# Patient Record
Sex: FEMALE | Race: WHITE | Employment: FULL TIME | URBAN - METROPOLITAN AREA
[De-identification: names, ages, dates, MRNs, and addresses within clinical notes are randomized per-mention and may not be internally consistent; named-entity substitution may affect disease eponyms.]

---

## 2023-08-08 ENCOUNTER — APPOINTMENT (OUTPATIENT)
Dept: GENERAL RADIOLOGY | Age: 63
End: 2023-08-08
Payer: COMMERCIAL

## 2023-08-08 ENCOUNTER — HOSPITAL ENCOUNTER (EMERGENCY)
Age: 63
Discharge: HOME | End: 2023-08-08
Attending: EMERGENCY MEDICINE
Payer: COMMERCIAL

## 2023-08-08 VITALS
WEIGHT: 293 LBS | HEART RATE: 83 BPM | TEMPERATURE: 97.8 F | DIASTOLIC BLOOD PRESSURE: 81 MMHG | SYSTOLIC BLOOD PRESSURE: 110 MMHG | HEIGHT: 70 IN | RESPIRATION RATE: 20 BRPM | BODY MASS INDEX: 41.95 KG/M2 | OXYGEN SATURATION: 94 %

## 2023-08-08 DIAGNOSIS — S39.012A LUMBAR STRAIN, INITIAL ENCOUNTER: ICD-10-CM

## 2023-08-08 DIAGNOSIS — N30.00 ACUTE CYSTITIS WITHOUT HEMATURIA: ICD-10-CM

## 2023-08-08 DIAGNOSIS — M25.532 WRIST PAIN, ACUTE, LEFT: Primary | ICD-10-CM

## 2023-08-08 LAB
BACTERIA URNS QL MICRO: ABNORMAL /HPF
BILIRUB UR QL STRIP: NEGATIVE
CLARITY UR: CLEAR
COLOR UR: YELLOW
EPI CELLS #/AREA URNS HPF: ABNORMAL /HPF
GLUCOSE UR STRIP-MCNC: NEGATIVE MG/DL
HGB UR QL STRIP: NORMAL
KETONES UR STRIP-MCNC: NEGATIVE MG/DL
LEUKOCYTE ESTERASE UR QL STRIP: NORMAL
NITRITE UR QL STRIP: POSITIVE
PH UR STRIP: 5 [PH] (ref 5–9)
PROT UR STRIP-MCNC: NEGATIVE MG/DL
RBC #/AREA URNS HPF: ABNORMAL /HPF (ref 0–2)
SP GR UR STRIP: 1.01 (ref 1–1.03)
URINE REFLEX TO CULTURE: NORMAL
UROBILINOGEN UR STRIP-ACNC: 0.2 E.U./DL
WBC #/AREA URNS HPF: ABNORMAL /HPF (ref 0–5)

## 2023-08-08 PROCEDURE — 6370000000 HC RX 637 (ALT 250 FOR IP): Performed by: EMERGENCY MEDICINE

## 2023-08-08 PROCEDURE — 73110 X-RAY EXAM OF WRIST: CPT

## 2023-08-08 PROCEDURE — 81001 URINALYSIS AUTO W/SCOPE: CPT

## 2023-08-08 PROCEDURE — 87077 CULTURE AEROBIC IDENTIFY: CPT

## 2023-08-08 PROCEDURE — 87086 URINE CULTURE/COLONY COUNT: CPT

## 2023-08-08 PROCEDURE — 87186 SC STD MICRODIL/AGAR DIL: CPT

## 2023-08-08 PROCEDURE — 99284 EMERGENCY DEPT VISIT MOD MDM: CPT

## 2023-08-08 PROCEDURE — 71046 X-RAY EXAM CHEST 2 VIEWS: CPT

## 2023-08-08 RX ORDER — FUROSEMIDE 20 MG/1
20 TABLET ORAL DAILY
COMMUNITY

## 2023-08-08 RX ORDER — OXYBUTYNIN CHLORIDE 10 MG/1
10 TABLET, EXTENDED RELEASE ORAL 2 TIMES DAILY
COMMUNITY

## 2023-08-08 RX ORDER — CHLORHEXIDINE GLUCONATE 4 %
LIQUID (ML) TOPICAL
Status: ON HOLD | COMMUNITY
End: 2023-08-31 | Stop reason: HOSPADM

## 2023-08-08 RX ORDER — LEVOTHYROXINE SODIUM 0.2 MG/1
200 TABLET ORAL DAILY
COMMUNITY

## 2023-08-08 RX ORDER — HYDROCODONE BITARTRATE AND ACETAMINOPHEN 5; 325 MG/1; MG/1
1 TABLET ORAL EVERY 6 HOURS PRN
Qty: 12 TABLET | Refills: 0 | Status: SHIPPED | OUTPATIENT
Start: 2023-08-08 | End: 2023-08-11

## 2023-08-08 RX ORDER — OLMESARTAN MEDOXOMIL 40 MG/1
40 TABLET ORAL DAILY
Status: ON HOLD | COMMUNITY
End: 2023-08-31 | Stop reason: HOSPADM

## 2023-08-08 RX ORDER — NITROFURANTOIN 25; 75 MG/1; MG/1
100 CAPSULE ORAL ONCE
Status: COMPLETED | OUTPATIENT
Start: 2023-08-08 | End: 2023-08-08

## 2023-08-08 RX ORDER — NITROFURANTOIN 25; 75 MG/1; MG/1
100 CAPSULE ORAL 2 TIMES DAILY
Qty: 10 CAPSULE | Refills: 0 | Status: SHIPPED | OUTPATIENT
Start: 2023-08-08 | End: 2023-08-13

## 2023-08-08 RX ORDER — CALCIUM POLYCARBOPHIL 625 MG 625 MG/1
625 TABLET ORAL DAILY
COMMUNITY

## 2023-08-08 RX ORDER — HYDROCODONE BITARTRATE AND ACETAMINOPHEN 5; 325 MG/1; MG/1
1 TABLET ORAL ONCE
Status: COMPLETED | OUTPATIENT
Start: 2023-08-08 | End: 2023-08-08

## 2023-08-08 RX ORDER — METOPROLOL TARTRATE 50 MG/1
50 TABLET, FILM COATED ORAL 2 TIMES DAILY
Status: ON HOLD | COMMUNITY
End: 2023-08-31 | Stop reason: HOSPADM

## 2023-08-08 RX ORDER — SPIRONOLACTONE 25 MG/1
25 TABLET ORAL DAILY
COMMUNITY

## 2023-08-08 RX ORDER — ACETAMINOPHEN 500 MG
500 TABLET ORAL EVERY 6 HOURS PRN
Status: ON HOLD | COMMUNITY
End: 2023-08-31 | Stop reason: HOSPADM

## 2023-08-08 RX ORDER — CYCLOBENZAPRINE HCL 10 MG
10 TABLET ORAL 3 TIMES DAILY PRN
Qty: 12 TABLET | Refills: 0 | Status: SHIPPED | OUTPATIENT
Start: 2023-08-08 | End: 2023-08-12

## 2023-08-08 RX ORDER — FLECAINIDE ACETATE 50 MG/1
50 TABLET ORAL 2 TIMES DAILY
COMMUNITY

## 2023-08-08 RX ORDER — OMEPRAZOLE 20 MG/1
20 CAPSULE, DELAYED RELEASE ORAL DAILY
COMMUNITY

## 2023-08-08 RX ORDER — ATORVASTATIN CALCIUM 40 MG/1
40 TABLET, FILM COATED ORAL NIGHTLY
COMMUNITY

## 2023-08-08 RX ORDER — CYCLOBENZAPRINE HCL 10 MG
10 TABLET ORAL ONCE
Status: COMPLETED | OUTPATIENT
Start: 2023-08-08 | End: 2023-08-08

## 2023-08-08 RX ORDER — CALCIUM CARBONATE 500(1250)
500 TABLET ORAL DAILY
Status: ON HOLD | COMMUNITY
End: 2023-08-31 | Stop reason: HOSPADM

## 2023-08-08 RX ORDER — FERROUS SULFATE 325(65) MG
325 TABLET ORAL
Status: ON HOLD | COMMUNITY
End: 2023-08-31 | Stop reason: SDUPTHER

## 2023-08-08 RX ADMIN — HYDROCODONE BITARTRATE AND ACETAMINOPHEN 1 TABLET: 5; 325 TABLET ORAL at 09:00

## 2023-08-08 RX ADMIN — NITROFURANTOIN MONOHYDRATE/MACROCRYSTALS 100 MG: 25; 75 CAPSULE ORAL at 09:19

## 2023-08-08 RX ADMIN — CYCLOBENZAPRINE 10 MG: 10 TABLET, FILM COATED ORAL at 09:00

## 2023-08-08 ASSESSMENT — ENCOUNTER SYMPTOMS
VOMITING: 0
EYE REDNESS: 0
COLOR CHANGE: 0
NAUSEA: 0
COUGH: 0
DIARRHEA: 0
EYE DISCHARGE: 0
SORE THROAT: 0
SHORTNESS OF BREATH: 0
BACK PAIN: 1
ABDOMINAL PAIN: 0
SINUS PAIN: 0

## 2023-08-08 ASSESSMENT — PAIN SCALES - GENERAL: PAINLEVEL_OUTOF10: 8

## 2023-08-08 ASSESSMENT — PAIN - FUNCTIONAL ASSESSMENT: PAIN_FUNCTIONAL_ASSESSMENT: 0-10

## 2023-08-08 ASSESSMENT — PAIN DESCRIPTION - ORIENTATION: ORIENTATION: LEFT

## 2023-08-08 ASSESSMENT — PAIN DESCRIPTION - DESCRIPTORS: DESCRIPTORS: SHARP

## 2023-08-08 ASSESSMENT — PAIN DESCRIPTION - LOCATION: LOCATION: ARM;BACK

## 2023-08-08 NOTE — ED PROVIDER NOTES
[facility] ED  EMERGENCY DEPARTMENT ENCOUNTER      Pt Name: Allegra Mosqueda  MRN: [id number]  [address] [facility], [place], operated by [facility] 1960  Date of evaluation: 8/8/2023  Provider: Krish Brito DO    CHIEF COMPLAINT       Chief Complaint   Patient presents with    Arm Pain     Pt c/o left sided arm pain and mid to upper back pain starting at 2am, denies injury, patient states she has had similar pain in her right hand and feet in the past, Pain is worse with movement and palpation. No deformity or bruising noted, slight swelling noted to left wrist     Chief complaint: Left wrist pain  History of chief complaint: This 80-year-old female presents the emergency department complaining of awakening around 2 AM with left wrist pain. Patient points on the dorsum of the wrist over the area of the distal radius. Patient states wrist is very painful and looks swollen. Patient states she felt fine when she went to bed. Patient denies any injury or trauma. Patient denies any numbness tingling or weakness to the extremity. Patient states pain is worse with movement. Patient denies any pain radiating up into the forearm elbow or upper arm. No associated neck pain. Patient relates a similar previous episode about 6 months ago on her right wrist, patient states she was seen at that time given a shot of steroids which did not seem to do much but pain gradually improved over about a 2-week. Patient states she has had previous milder episodes in her feet as well. Patient states she does have pains in her mid back as well, states she does have intermittent problems with the back with poor cushioning on her chair but did have increased pain in the back this morning points over the bilateral flank area.   Patient denies any dysuria hematuria frequency or urgency, radiation of pain into the buttocks or lower extremities, no loss of bowel or bladder control, no associated abdominal pain no chest pain palpitation or shortness of breath no numb tingling or weakness to the extremities. patient states pain is worse with movement. No recent illness cough cold congestion fevers or chills. Patient reports chronic leg swelling, no pain, not new or different. Patient is on Eliquis with a history of atrial fibrillation    Nursing Notes were reviewed. REVIEW OF SYSTEMS    (2-9 systems for level 4, 10 or more for level 5)     Review of Systems   Constitutional:  Negative for chills, diaphoresis and fever. HENT:  Negative for congestion, sinus pain and sore throat. Eyes:  Negative for discharge and redness. Respiratory:  Negative for cough and shortness of breath. Cardiovascular:  Negative for chest pain and palpitations. Gastrointestinal:  Negative for abdominal pain, diarrhea, nausea and vomiting. Genitourinary:  Positive for flank pain. Negative for difficulty urinating, dysuria and hematuria. Musculoskeletal:  Positive for back pain and joint swelling. Negative for neck pain. Skin:  Negative for color change and rash. Neurological:  Negative for dizziness, weakness, numbness and headaches. Hematological:  Negative for adenopathy. Except as noted above the remainder of the review of systems was reviewed and negative.        PAST MEDICAL HISTORY     Past Medical History:   Diagnosis Date    Atrial fibrillation ([address])     Hyperlipidemia     Hypertension     Thyroid disease          SURGICAL HISTORY       Past Surgical History:   Procedure Laterality Date    JOINT REPLACEMENT      THYROIDECTOMY           CURRENT MEDICATIONS       Previous Medications    ACETAMINOPHEN (TYLENOL) 500 MG TABLET    Take 1 tablet by mouth every 6 hours as needed for Pain    APIXABAN (ELIQUIS) 5 MG TABS TABLET    Take by mouth 2 times daily    ATORVASTATIN (LIPITOR) 40 MG TABLET    Take 1 tablet by mouth daily    CALCIUM CARBONATE (OSCAL) 500 MG TABS TABLET    Take 1 tablet by mouth daily    FERROUS SULFATE (IRON 325) 325 (65 FE) MG TABLET    Take 1 tablet by

## 2023-08-08 NOTE — ED TRIAGE NOTES
Pt c/o left wrist/arm/back pain all worse with movement and palpation. Pt states she has had similar pain in the past but not this bad. Pt states she is in the process of moving from [place] to be closer to family stating she has been in [place] for a month. Pt able to move fingers without difficulty, msps intact.

## 2023-08-10 LAB
BACTERIA UR CULT: ABNORMAL
BACTERIA UR CULT: ABNORMAL
ORGANISM: ABNORMAL

## 2023-08-27 ENCOUNTER — HOSPITAL ENCOUNTER (INPATIENT)
Age: 63
LOS: 3 days | Discharge: NURSING, REHAB OR LONG-TERM CARE | DRG: 683 | End: 2023-08-31
Attending: INTERNAL MEDICINE | Admitting: INTERNAL MEDICINE
Payer: COMMERCIAL

## 2023-08-27 ENCOUNTER — APPOINTMENT (OUTPATIENT)
Dept: GENERAL RADIOLOGY | Age: 63
DRG: 683 | End: 2023-08-27
Payer: COMMERCIAL

## 2023-08-27 DIAGNOSIS — I48.19 ATRIAL FIBRILLATION, PERSISTENT (HCC): ICD-10-CM

## 2023-08-27 DIAGNOSIS — N34.2 INFECTIVE URETHRITIS: ICD-10-CM

## 2023-08-27 DIAGNOSIS — N39.0 URINARY TRACT INFECTION IN FEMALE: Primary | ICD-10-CM

## 2023-08-27 DIAGNOSIS — R53.1 WEAKNESS: ICD-10-CM

## 2023-08-27 DIAGNOSIS — E86.0 DEHYDRATION: ICD-10-CM

## 2023-08-27 DIAGNOSIS — R53.1 GENERALIZED WEAKNESS: ICD-10-CM

## 2023-08-27 LAB
ALBUMIN SERPL-MCNC: 3 G/DL (ref 3.5–4.6)
ALP SERPL-CCNC: 147 U/L (ref 40–130)
ALT SERPL-CCNC: 67 U/L (ref 0–33)
ANION GAP SERPL CALCULATED.3IONS-SCNC: 16 MEQ/L (ref 9–15)
AST SERPL-CCNC: 78 U/L (ref 0–35)
BACTERIA URNS QL MICRO: ABNORMAL /HPF
BASOPHILS # BLD: 0.1 K/UL (ref 0–0.1)
BASOPHILS NFR BLD: 0.6 % (ref 0.1–1.2)
BILIRUB SERPL-MCNC: 0.5 MG/DL (ref 0.2–0.7)
BILIRUB UR QL STRIP: ABNORMAL
BNP BLD-MCNC: 1428 PG/ML
BUN SERPL-MCNC: 60 MG/DL (ref 8–23)
CALCIUM SERPL-MCNC: 10 MG/DL (ref 8.5–9.9)
CHLORIDE SERPL-SCNC: 101 MEQ/L (ref 95–107)
CLARITY UR: ABNORMAL
CO2 SERPL-SCNC: 21 MEQ/L (ref 20–31)
COLOR UR: YELLOW
CREAT SERPL-MCNC: 1.82 MG/DL (ref 0.5–0.9)
EOSINOPHIL # BLD: 0.1 K/UL (ref 0–0.4)
EOSINOPHIL NFR BLD: 1.1 % (ref 0.7–5.8)
EPI CELLS #/AREA URNS HPF: ABNORMAL /HPF
ERYTHROCYTE [DISTWIDTH] IN BLOOD BY AUTOMATED COUNT: 15.4 % (ref 11.7–14.4)
GLOBULIN SER CALC-MCNC: 4.1 G/DL (ref 2.3–3.5)
GLUCOSE SERPL-MCNC: 133 MG/DL (ref 70–99)
GLUCOSE UR STRIP-MCNC: NEGATIVE MG/DL
HCT VFR BLD AUTO: 30 % (ref 37–47)
HGB BLD-MCNC: 9.3 G/DL (ref 11.2–15.7)
HGB UR QL STRIP: ABNORMAL
IMM GRANULOCYTES # BLD: 0 K/UL
IMM GRANULOCYTES NFR BLD: 0.5 %
INR PPP: 1.7
KETONES UR STRIP-MCNC: ABNORMAL MG/DL
LEUKOCYTE ESTERASE UR QL STRIP: ABNORMAL
LYMPHOCYTES # BLD: 0.8 K/UL (ref 1.2–3.7)
LYMPHOCYTES NFR BLD: 9.9 %
MCH RBC QN AUTO: 26.9 PG (ref 25.6–32.2)
MCHC RBC AUTO-ENTMCNC: 31 % (ref 32.2–35.5)
MCV RBC AUTO: 86.7 FL (ref 79.4–94.8)
MONOCYTES # BLD: 0.5 K/UL (ref 0.2–0.9)
MONOCYTES NFR BLD: 6.3 % (ref 4.7–12.5)
NEUTROPHILS # BLD: 6.7 K/UL (ref 1.6–6.1)
NEUTS SEG NFR BLD: 81.6 % (ref 34–71.1)
NITRITE UR QL STRIP: NEGATIVE
PH UR STRIP: 5.5 [PH] (ref 5–9)
PLATELET # BLD AUTO: 479 K/UL (ref 182–369)
POTASSIUM SERPL-SCNC: 4.4 MEQ/L (ref 3.4–4.9)
PROT SERPL-MCNC: 7.1 G/DL (ref 6.3–8)
PROT UR STRIP-MCNC: 100 MG/DL
PROTHROMBIN TIME: 20.4 SEC (ref 12.3–14.9)
RBC # BLD AUTO: 3.46 M/UL (ref 3.93–5.22)
RBC #/AREA URNS HPF: ABNORMAL /HPF (ref 0–2)
SODIUM SERPL-SCNC: 138 MEQ/L (ref 135–144)
SP GR UR STRIP: 1.02 (ref 1–1.03)
TROPONIN T SERPL-MCNC: <0.01 NG/ML (ref 0–0.01)
URINE REFLEX TO CULTURE: YES
UROBILINOGEN UR STRIP-ACNC: 2 E.U./DL
WBC # BLD AUTO: 8.2 K/UL (ref 4–10)
WBC #/AREA URNS HPF: >100 /HPF (ref 0–5)

## 2023-08-27 PROCEDURE — 85025 COMPLETE CBC W/AUTO DIFF WBC: CPT

## 2023-08-27 PROCEDURE — 6370000000 HC RX 637 (ALT 250 FOR IP): Performed by: INTERNAL MEDICINE

## 2023-08-27 PROCEDURE — 6360000002 HC RX W HCPCS: Performed by: INTERNAL MEDICINE

## 2023-08-27 PROCEDURE — 2580000003 HC RX 258

## 2023-08-27 PROCEDURE — 6370000000 HC RX 637 (ALT 250 FOR IP)

## 2023-08-27 PROCEDURE — 81001 URINALYSIS AUTO W/SCOPE: CPT

## 2023-08-27 PROCEDURE — 87040 BLOOD CULTURE FOR BACTERIA: CPT

## 2023-08-27 PROCEDURE — 96365 THER/PROPH/DIAG IV INF INIT: CPT

## 2023-08-27 PROCEDURE — 96361 HYDRATE IV INFUSION ADD-ON: CPT

## 2023-08-27 PROCEDURE — 87086 URINE CULTURE/COLONY COUNT: CPT

## 2023-08-27 PROCEDURE — 93005 ELECTROCARDIOGRAM TRACING: CPT

## 2023-08-27 PROCEDURE — G0378 HOSPITAL OBSERVATION PER HR: HCPCS

## 2023-08-27 PROCEDURE — 83880 ASSAY OF NATRIURETIC PEPTIDE: CPT

## 2023-08-27 PROCEDURE — 80053 COMPREHEN METABOLIC PANEL: CPT

## 2023-08-27 PROCEDURE — 84484 ASSAY OF TROPONIN QUANT: CPT

## 2023-08-27 PROCEDURE — A4216 STERILE WATER/SALINE, 10 ML: HCPCS | Performed by: INTERNAL MEDICINE

## 2023-08-27 PROCEDURE — 96375 TX/PRO/DX INJ NEW DRUG ADDON: CPT

## 2023-08-27 PROCEDURE — 2580000003 HC RX 258: Performed by: INTERNAL MEDICINE

## 2023-08-27 PROCEDURE — 85610 PROTHROMBIN TIME: CPT

## 2023-08-27 PROCEDURE — 99285 EMERGENCY DEPT VISIT HI MDM: CPT

## 2023-08-27 PROCEDURE — 6360000002 HC RX W HCPCS

## 2023-08-27 PROCEDURE — 73562 X-RAY EXAM OF KNEE 3: CPT

## 2023-08-27 PROCEDURE — 87077 CULTURE AEROBIC IDENTIFY: CPT

## 2023-08-27 PROCEDURE — 36415 COLL VENOUS BLD VENIPUNCTURE: CPT

## 2023-08-27 PROCEDURE — C9113 INJ PANTOPRAZOLE SODIUM, VIA: HCPCS | Performed by: INTERNAL MEDICINE

## 2023-08-27 RX ORDER — SODIUM CHLORIDE 9 MG/ML
INJECTION, SOLUTION INTRAVENOUS CONTINUOUS
Status: DISPENSED | OUTPATIENT
Start: 2023-08-27 | End: 2023-08-28

## 2023-08-27 RX ORDER — SODIUM CHLORIDE 0.9 % (FLUSH) 0.9 %
10 SYRINGE (ML) INJECTION EVERY 12 HOURS SCHEDULED
Status: DISCONTINUED | OUTPATIENT
Start: 2023-08-27 | End: 2023-08-31 | Stop reason: HOSPADM

## 2023-08-27 RX ORDER — PANTOPRAZOLE SODIUM 40 MG/1
40 TABLET, DELAYED RELEASE ORAL
Status: DISCONTINUED | OUTPATIENT
Start: 2023-08-28 | End: 2023-08-27

## 2023-08-27 RX ORDER — POLYETHYLENE GLYCOL 3350 17 G/17G
17 POWDER, FOR SOLUTION ORAL DAILY PRN
Status: DISCONTINUED | OUTPATIENT
Start: 2023-08-27 | End: 2023-08-31 | Stop reason: HOSPADM

## 2023-08-27 RX ORDER — ATORVASTATIN CALCIUM 40 MG/1
40 TABLET, FILM COATED ORAL DAILY
Status: DISCONTINUED | OUTPATIENT
Start: 2023-08-27 | End: 2023-08-31 | Stop reason: HOSPADM

## 2023-08-27 RX ORDER — PROMETHAZINE HYDROCHLORIDE 12.5 MG/1
12.5 TABLET ORAL EVERY 6 HOURS PRN
Status: DISCONTINUED | OUTPATIENT
Start: 2023-08-27 | End: 2023-08-31 | Stop reason: HOSPADM

## 2023-08-27 RX ORDER — LEVOTHYROXINE SODIUM 0.1 MG/1
200 TABLET ORAL DAILY
Status: DISCONTINUED | OUTPATIENT
Start: 2023-08-27 | End: 2023-08-31 | Stop reason: HOSPADM

## 2023-08-27 RX ORDER — ONDANSETRON 2 MG/ML
4 INJECTION INTRAMUSCULAR; INTRAVENOUS EVERY 6 HOURS PRN
Status: DISCONTINUED | OUTPATIENT
Start: 2023-08-27 | End: 2023-08-31 | Stop reason: HOSPADM

## 2023-08-27 RX ORDER — ACETAMINOPHEN 650 MG/1
650 SUPPOSITORY RECTAL EVERY 6 HOURS PRN
Status: DISCONTINUED | OUTPATIENT
Start: 2023-08-27 | End: 2023-08-31 | Stop reason: HOSPADM

## 2023-08-27 RX ORDER — METOPROLOL TARTRATE 50 MG/1
50 TABLET, FILM COATED ORAL 2 TIMES DAILY
Status: DISCONTINUED | OUTPATIENT
Start: 2023-08-27 | End: 2023-08-27

## 2023-08-27 RX ORDER — 0.9 % SODIUM CHLORIDE 0.9 %
500 INTRAVENOUS SOLUTION INTRAVENOUS ONCE
Status: COMPLETED | OUTPATIENT
Start: 2023-08-27 | End: 2023-08-27

## 2023-08-27 RX ORDER — SODIUM CHLORIDE 0.9 % (FLUSH) 0.9 %
10 SYRINGE (ML) INJECTION PRN
Status: DISCONTINUED | OUTPATIENT
Start: 2023-08-27 | End: 2023-08-31 | Stop reason: HOSPADM

## 2023-08-27 RX ORDER — FLECAINIDE ACETATE 50 MG/1
50 TABLET ORAL 2 TIMES DAILY
Status: DISCONTINUED | OUTPATIENT
Start: 2023-08-27 | End: 2023-08-31 | Stop reason: HOSPADM

## 2023-08-27 RX ORDER — OXYBUTYNIN CHLORIDE 5 MG/1
10 TABLET, EXTENDED RELEASE ORAL NIGHTLY
Status: DISCONTINUED | OUTPATIENT
Start: 2023-08-27 | End: 2023-08-31 | Stop reason: HOSPADM

## 2023-08-27 RX ORDER — ACETAMINOPHEN 325 MG/1
650 TABLET ORAL EVERY 6 HOURS PRN
Status: DISCONTINUED | OUTPATIENT
Start: 2023-08-27 | End: 2023-08-31 | Stop reason: HOSPADM

## 2023-08-27 RX ORDER — ACETAMINOPHEN 325 MG/1
650 TABLET ORAL ONCE
Status: COMPLETED | OUTPATIENT
Start: 2023-08-27 | End: 2023-08-27

## 2023-08-27 RX ORDER — SODIUM CHLORIDE 9 MG/ML
INJECTION, SOLUTION INTRAVENOUS PRN
Status: DISCONTINUED | OUTPATIENT
Start: 2023-08-27 | End: 2023-08-31 | Stop reason: HOSPADM

## 2023-08-27 RX ADMIN — CEFTRIAXONE 1000 MG: 1 INJECTION, POWDER, FOR SOLUTION INTRAMUSCULAR; INTRAVENOUS at 16:08

## 2023-08-27 RX ADMIN — SODIUM CHLORIDE 500 ML: 9 INJECTION, SOLUTION INTRAVENOUS at 16:07

## 2023-08-27 RX ADMIN — SODIUM CHLORIDE: 9 INJECTION, SOLUTION INTRAVENOUS at 18:13

## 2023-08-27 RX ADMIN — PANTOPRAZOLE SODIUM 40 MG: 40 INJECTION, POWDER, FOR SOLUTION INTRAVENOUS at 18:16

## 2023-08-27 RX ADMIN — OXYBUTYNIN CHLORIDE 10 MG: 5 TABLET, EXTENDED RELEASE ORAL at 20:50

## 2023-08-27 RX ADMIN — ACETAMINOPHEN 650 MG: 325 TABLET ORAL at 20:49

## 2023-08-27 RX ADMIN — FLECAINIDE ACETATE 50 MG: 50 TABLET ORAL at 20:50

## 2023-08-27 RX ADMIN — APIXABAN 5 MG: 5 TABLET, FILM COATED ORAL at 20:50

## 2023-08-27 RX ADMIN — ACETAMINOPHEN 650 MG: 325 TABLET ORAL at 16:10

## 2023-08-27 RX ADMIN — ATORVASTATIN CALCIUM 40 MG: 40 TABLET, FILM COATED ORAL at 20:50

## 2023-08-27 ASSESSMENT — PAIN - FUNCTIONAL ASSESSMENT
PAIN_FUNCTIONAL_ASSESSMENT: 0-10

## 2023-08-27 ASSESSMENT — PAIN DESCRIPTION - LOCATION
LOCATION: KNEE
LOCATION: LEG;KNEE
LOCATION: KNEE
LOCATION: KNEE

## 2023-08-27 ASSESSMENT — LIFESTYLE VARIABLES
HOW OFTEN DO YOU HAVE A DRINK CONTAINING ALCOHOL: NEVER
HOW MANY STANDARD DRINKS CONTAINING ALCOHOL DO YOU HAVE ON A TYPICAL DAY: PATIENT DOES NOT DRINK

## 2023-08-27 ASSESSMENT — PAIN DESCRIPTION - ORIENTATION
ORIENTATION: RIGHT
ORIENTATION: RIGHT;LEFT
ORIENTATION: RIGHT
ORIENTATION: RIGHT

## 2023-08-27 ASSESSMENT — ENCOUNTER SYMPTOMS
SORE THROAT: 0
ABDOMINAL PAIN: 0
NAUSEA: 0
COUGH: 0
DIARRHEA: 0
VOMITING: 0
BACK PAIN: 0
SHORTNESS OF BREATH: 0

## 2023-08-27 ASSESSMENT — PAIN SCALES - GENERAL
PAINLEVEL_OUTOF10: 7
PAINLEVEL_OUTOF10: 6
PAINLEVEL_OUTOF10: 8
PAINLEVEL_OUTOF10: 7
PAINLEVEL_OUTOF10: 7

## 2023-08-27 ASSESSMENT — PAIN DESCRIPTION - ONSET: ONSET: ON-GOING

## 2023-08-27 ASSESSMENT — PAIN DESCRIPTION - FREQUENCY: FREQUENCY: INTERMITTENT

## 2023-08-27 ASSESSMENT — PAIN DESCRIPTION - DESCRIPTORS
DESCRIPTORS: ACHING
DESCRIPTORS: ACHING;SORE
DESCRIPTORS: SORE

## 2023-08-27 ASSESSMENT — PAIN DESCRIPTION - PAIN TYPE
TYPE: ACUTE PAIN
TYPE: ACUTE PAIN

## 2023-08-27 NOTE — ED PROVIDER NOTES
[facility] ED  eMERGENCYdEPARTMENT eNCOUnter      Pt Name: Juan David Arizmendi  MRN: [id number]  [facility] 1960[facility] evaluation: 8/27/2023  Provider:TRAE Reyes CNP    CHIEF COMPLAINT       Chief Complaint   Patient presents with    Fatigue     Progressing weakness and fatigue over the past 3 weeks         HISTORY OF PRESENT ILLNESS  (Location/Symptom, Timing/Onset, Context/Setting, Quality, Duration, Modifying Factors, Severity.)   Juan David Arizmendi is a 59 y.o. female hx of Afib, Thyroid disease, hyperlipidemia, HTN, who presents to the emergency department with fatigue. Presents emergency department with increasing fatigue over the last 3 weeks. She states she moved to [place] from [place] last year and has not established with medical provider. Over the course of the past year she has gradually decreased in her ability to be mobile and care for herself per her report. She states yesterday she slid from the bed to the floor and had to call for EMS to help assist her getting up. She denies any head injury or LOC. She complains of ongoing right knee pain. She states she has difficulty ambulating around her house and is progressively weaker than she was months ago. She is requesting possible hospital admission for therapy rehab as she feels as though she cannot take care of herself at home. SHe is also concerned for possible UTI infection. She denies any chest pain, shortness of breath, abdominal pain, nausea, vomiting, diarrhea, fever, chills ,headache, or recent illness. HPI    Nursing Notes were reviewed and I agree. REVIEW OF SYSTEMS    (2-9 systems for level 4, 10 or more for level 5)     Review of Systems   Constitutional:  Positive for fatigue. Negative for activity change, chills and fever. HENT:  Negative for ear pain and sore throat. Eyes:  Negative for visual disturbance. Respiratory:  Negative for cough and shortness of breath.     Cardiovascular:  Negative for chest pain,

## 2023-08-27 NOTE — ED TRIAGE NOTES
Pt a&o x4. Pt presents to ED with c/o weakness that is progressively worsening over past 3 weeks. Pt states she fell last night attempting to transfer to bed. C/O bilat knee pain, rt hip pain, and bilat foot pain. No obvious injury noted. Pt states she has been using walker, but now unable to self-transfer d/t weakness.

## 2023-08-27 NOTE — ED NOTES
Pt in bed. Advised urine specimen needed. Pt states she \"cannot stand\" to transfer to [facility]. When asked how pt toilets at home, pt states, \"I haven't been able to go to bathroom\". Pt wearing incontinence brief now. Spoke to Donna PARKER, will place order for straight cath. Pt informed of procedure and agrees to POC.       Cuca Troy RN  08/27/23 [id number]

## 2023-08-27 NOTE — ED NOTES
Upon repositioning pt in bed to begin straight cath procedure, pt stated, \"I think I can right now\". Urethra orifice cleaned and pt was able to urinate on own. Urine obtained and sent to lab.       Rigoberto Benitez RN  08/27/23 [id number]

## 2023-08-28 ENCOUNTER — APPOINTMENT (OUTPATIENT)
Dept: CT IMAGING | Age: 63
DRG: 683 | End: 2023-08-28
Payer: COMMERCIAL

## 2023-08-28 ENCOUNTER — APPOINTMENT (OUTPATIENT)
Dept: MRI IMAGING | Age: 63
End: 2023-08-28
Attending: INTERNAL MEDICINE
Payer: COMMERCIAL

## 2023-08-28 PROBLEM — R53.1 WEAKNESS: Status: ACTIVE | Noted: 2023-08-28

## 2023-08-28 LAB
ALBUMIN SERPL-MCNC: 2.6 G/DL (ref 3.5–4.6)
ALP SERPL-CCNC: 116 U/L (ref 40–130)
ALT SERPL-CCNC: 54 U/L (ref 0–33)
ANION GAP SERPL CALCULATED.3IONS-SCNC: 13 MEQ/L (ref 9–15)
AST SERPL-CCNC: 56 U/L (ref 0–35)
BASOPHILS # BLD: 0.1 K/UL (ref 0–0.1)
BASOPHILS NFR BLD: 1.1 % (ref 0.1–1.2)
BILIRUB SERPL-MCNC: 0.3 MG/DL (ref 0.2–0.7)
BUN SERPL-MCNC: 50 MG/DL (ref 8–23)
CALCIUM SERPL-MCNC: 10 MG/DL (ref 8.5–9.9)
CHLORIDE SERPL-SCNC: 105 MEQ/L (ref 95–107)
CO2 SERPL-SCNC: 21 MEQ/L (ref 20–31)
CREAT SERPL-MCNC: 1.25 MG/DL (ref 0.5–0.9)
EKG ATRIAL RATE: 267 BPM
EKG P AXIS: 16 DEGREES
EKG Q-T INTERVAL: 414 MS
EKG QRS DURATION: 100 MS
EKG QTC CALCULATION (BAZETT): 465 MS
EKG R AXIS: -23 DEGREES
EKG T AXIS: 20 DEGREES
EKG VENTRICULAR RATE: 76 BPM
EOSINOPHIL # BLD: 0.3 K/UL (ref 0–0.4)
EOSINOPHIL NFR BLD: 4.6 % (ref 0.7–5.8)
ERYTHROCYTE [DISTWIDTH] IN BLOOD BY AUTOMATED COUNT: 15.3 % (ref 11.7–14.4)
FERRITIN: 582 NG/ML (ref 13–150)
FOLATE: 4.1 NG/ML
GLOBULIN SER CALC-MCNC: 4.6 G/DL (ref 2.3–3.5)
GLUCOSE SERPL-MCNC: 103 MG/DL (ref 70–99)
HAV IGM SER IA-ACNC: NONREACTIVE
HCT VFR BLD AUTO: 27 % (ref 37–47)
HEPATITIS B CORE IGM ANTIBODY: NONREACTIVE
HEPATITIS B SURF AG,XHBAGS: NONREACTIVE
HEPATITIS C ANTIBODY: NONREACTIVE
HGB BLD-MCNC: 8.4 G/DL (ref 11.2–15.7)
IMM GRANULOCYTES # BLD: 0 K/UL
IMM GRANULOCYTES NFR BLD: 0.4 %
IRON SATURATION: 21 % (ref 20–55)
IRON: 35 UG/DL (ref 37–145)
LYMPHOCYTES # BLD: 1.3 K/UL (ref 1.2–3.7)
LYMPHOCYTES NFR BLD: 23.1 %
MCH RBC QN AUTO: 26.7 PG (ref 25.6–32.2)
MCHC RBC AUTO-ENTMCNC: 31.1 % (ref 32.2–35.5)
MCV RBC AUTO: 85.7 FL (ref 79.4–94.8)
MONOCYTES # BLD: 0.5 K/UL (ref 0.2–0.9)
MONOCYTES NFR BLD: 8.5 % (ref 4.7–12.5)
NEUTROPHILS # BLD: 3.5 K/UL (ref 1.6–6.1)
NEUTS SEG NFR BLD: 62.3 % (ref 34–71.1)
PLATELET # BLD AUTO: 430 K/UL (ref 182–369)
POTASSIUM SERPL-SCNC: 3.9 MEQ/L (ref 3.4–4.9)
PROT SERPL-MCNC: 7.2 G/DL (ref 6.3–8)
RBC # BLD AUTO: 3.15 M/UL (ref 3.93–5.22)
SODIUM SERPL-SCNC: 139 MEQ/L (ref 135–144)
TOTAL IRON BINDING CAPACITY: 166 UG/DL (ref 250–450)
TSH SERPL-MCNC: 6.39 UIU/ML (ref 0.44–3.86)
UNSATURATED IRON BINDING CAPACITY: 131 UG/DL (ref 112–347)
VITAMIN B-12: 658 PG/ML (ref 232–1245)
WBC # BLD AUTO: 5.6 K/UL (ref 4–10)

## 2023-08-28 PROCEDURE — 85025 COMPLETE CBC W/AUTO DIFF WBC: CPT

## 2023-08-28 PROCEDURE — 96375 TX/PRO/DX INJ NEW DRUG ADDON: CPT

## 2023-08-28 PROCEDURE — 2580000003 HC RX 258: Performed by: INTERNAL MEDICINE

## 2023-08-28 PROCEDURE — C9113 INJ PANTOPRAZOLE SODIUM, VIA: HCPCS | Performed by: INTERNAL MEDICINE

## 2023-08-28 PROCEDURE — 72148 MRI LUMBAR SPINE W/O DYE: CPT

## 2023-08-28 PROCEDURE — 51798 US URINE CAPACITY MEASURE: CPT

## 2023-08-28 PROCEDURE — 82746 ASSAY OF FOLIC ACID SERUM: CPT

## 2023-08-28 PROCEDURE — 97166 OT EVAL MOD COMPLEX 45 MIN: CPT

## 2023-08-28 PROCEDURE — 97530 THERAPEUTIC ACTIVITIES: CPT

## 2023-08-28 PROCEDURE — 97162 PT EVAL MOD COMPLEX 30 MIN: CPT

## 2023-08-28 PROCEDURE — A4216 STERILE WATER/SALINE, 10 ML: HCPCS | Performed by: INTERNAL MEDICINE

## 2023-08-28 PROCEDURE — 6370000000 HC RX 637 (ALT 250 FOR IP): Performed by: INTERNAL MEDICINE

## 2023-08-28 PROCEDURE — 93010 ELECTROCARDIOGRAM REPORT: CPT | Performed by: INTERNAL MEDICINE

## 2023-08-28 PROCEDURE — 6360000002 HC RX W HCPCS: Performed by: INTERNAL MEDICINE

## 2023-08-28 PROCEDURE — 83540 ASSAY OF IRON: CPT

## 2023-08-28 PROCEDURE — 36415 COLL VENOUS BLD VENIPUNCTURE: CPT

## 2023-08-28 PROCEDURE — 80053 COMPREHEN METABOLIC PANEL: CPT

## 2023-08-28 PROCEDURE — 70450 CT HEAD/BRAIN W/O DYE: CPT

## 2023-08-28 PROCEDURE — 72146 MRI CHEST SPINE W/O DYE: CPT

## 2023-08-28 PROCEDURE — 83550 IRON BINDING TEST: CPT

## 2023-08-28 PROCEDURE — 82607 VITAMIN B-12: CPT

## 2023-08-28 PROCEDURE — 96361 HYDRATE IV INFUSION ADD-ON: CPT

## 2023-08-28 PROCEDURE — 84443 ASSAY THYROID STIM HORMONE: CPT

## 2023-08-28 PROCEDURE — 72141 MRI NECK SPINE W/O DYE: CPT

## 2023-08-28 PROCEDURE — 80074 ACUTE HEPATITIS PANEL: CPT

## 2023-08-28 PROCEDURE — 1210000000 HC MED SURG R&B

## 2023-08-28 PROCEDURE — 82728 ASSAY OF FERRITIN: CPT

## 2023-08-28 RX ORDER — 0.9 % SODIUM CHLORIDE 0.9 %
250 INTRAVENOUS SOLUTION INTRAVENOUS ONCE
Status: COMPLETED | OUTPATIENT
Start: 2023-08-28 | End: 2023-08-28

## 2023-08-28 RX ORDER — SODIUM CHLORIDE 9 MG/ML
INJECTION, SOLUTION INTRAVENOUS CONTINUOUS
Status: DISCONTINUED | OUTPATIENT
Start: 2023-08-28 | End: 2023-08-29

## 2023-08-28 RX ORDER — 0.9 % SODIUM CHLORIDE 0.9 %
500 INTRAVENOUS SOLUTION INTRAVENOUS ONCE
Status: DISCONTINUED | OUTPATIENT
Start: 2023-08-28 | End: 2023-08-28 | Stop reason: ALTCHOICE

## 2023-08-28 RX ORDER — DIAZEPAM 5 MG/1
5 TABLET ORAL
Status: COMPLETED | OUTPATIENT
Start: 2023-08-28 | End: 2023-08-28

## 2023-08-28 RX ADMIN — ATORVASTATIN CALCIUM 40 MG: 40 TABLET, FILM COATED ORAL at 20:28

## 2023-08-28 RX ADMIN — SODIUM CHLORIDE 250 ML: 9 INJECTION, SOLUTION INTRAVENOUS at 20:26

## 2023-08-28 RX ADMIN — LEVOTHYROXINE SODIUM 200 MCG: 100 TABLET ORAL at 05:02

## 2023-08-28 RX ADMIN — CEFTRIAXONE 1000 MG: 1 INJECTION, POWDER, FOR SOLUTION INTRAMUSCULAR; INTRAVENOUS at 10:58

## 2023-08-28 RX ADMIN — ACETAMINOPHEN 650 MG: 325 TABLET ORAL at 02:50

## 2023-08-28 RX ADMIN — FLECAINIDE ACETATE 50 MG: 50 TABLET ORAL at 20:28

## 2023-08-28 RX ADMIN — SODIUM CHLORIDE: 900 INJECTION, SOLUTION INTRAVENOUS at 10:56

## 2023-08-28 RX ADMIN — METOPROLOL TARTRATE 25 MG: 25 TABLET, FILM COATED ORAL at 20:29

## 2023-08-28 RX ADMIN — FLECAINIDE ACETATE 50 MG: 50 TABLET ORAL at 10:17

## 2023-08-28 RX ADMIN — PANTOPRAZOLE SODIUM 40 MG: 40 INJECTION, POWDER, FOR SOLUTION INTRAVENOUS at 04:58

## 2023-08-28 RX ADMIN — OXYBUTYNIN CHLORIDE 10 MG: 5 TABLET, EXTENDED RELEASE ORAL at 20:27

## 2023-08-28 RX ADMIN — APIXABAN 5 MG: 5 TABLET, FILM COATED ORAL at 20:28

## 2023-08-28 RX ADMIN — ACETAMINOPHEN 650 MG: 325 TABLET ORAL at 20:34

## 2023-08-28 RX ADMIN — APIXABAN 5 MG: 5 TABLET, FILM COATED ORAL at 10:16

## 2023-08-28 RX ADMIN — Medication 10 ML: at 10:18

## 2023-08-28 RX ADMIN — DIAZEPAM 5 MG: 5 TABLET ORAL at 14:30

## 2023-08-28 ASSESSMENT — PAIN DESCRIPTION - DESCRIPTORS
DESCRIPTORS: ACHING
DESCRIPTORS: ACHING

## 2023-08-28 ASSESSMENT — PAIN DESCRIPTION - ORIENTATION
ORIENTATION: RIGHT
ORIENTATION: RIGHT;LEFT

## 2023-08-28 ASSESSMENT — PAIN SCALES - GENERAL
PAINLEVEL_OUTOF10: 8
PAINLEVEL_OUTOF10: 8

## 2023-08-28 ASSESSMENT — PAIN DESCRIPTION - LOCATION: LOCATION: KNEE;HIP

## 2023-08-28 NOTE — CARE COORDINATION
MRI cervical, lumbar and thoracic spine is scheduled for [facility] EMERGENCY [facility] AT [place]. Patient has to arrive by 3:00pm, this afternoon. Katy Lopes is transporting patient.

## 2023-08-28 NOTE — PROGRESS NOTES
[facility] Initial Discharge Assessment    Met with Patient to discuss discharge plan. PCP: No primary care provider on file. Patient reports that she recently moved from [place] to [place] \"the end of May\" 2023                                   Date of Last Visit: April 19th in [place]     If no PCP, list provided? List of PCP's provided     Discharge Planning    Living Arrangements:  Patient reports that she has been staying with son in split level home while the ranch home patient recently purchased is being renovated. Patient reports that she has only sponge bathed since residing with son as son's shower \"it was too small\"     Who do you live with? Adult daughter is reported to have autism and is higher functioning. Patient reports that daughter resides with patient     Who helps you with your care:  self, \" In the last three weeks it has gotten so bad I havent been able to walk on my own. \"  Patient reports experiencing an increase in weakness     If lives at home:     Do you have any barriers navigating in your home? yes - patient reports that she is having bathroom renovated and has been staying with son until renovations are completed     Patient can perform ADL? No    Current Services (outpatient and in home) :  None    Dialysis: No    Is transportation available to get to your appointments? Yes    DME Equipment:  yes - rollator, walker, quad cane, a shower asia and grab bars are being installed in bathroom     Respiratory equipment: None    Respiratory provider:  no     Pharmacy:  yes - [address] [facility] [address] to afford cost of medication: Yes    Does patient feel safe at home? Yes    Does patient identify any home going needs? Yes, Patient reports interest in inpatient skilled therapy to get stronger before returning home    Patient agreeable to [address]? N/A    Patient agreeable to SNF/Rehab? Yes, [address]    Other discharge needs identified?       Other patient provided with list of PCPs and encouraged to schedule an appointment     Was Freedom of Choice Provided? Yes    Initial Discharge Plan? (Note: please see concurrent daily documentation for any updates after initial note). Patient was admitted to [facility] and being treated for UTI. Patient's care discussed in daily quality rounds. Patient was evaluated by OT and OT recommending slower paced SNF. This  met with patient to discuss DC planning. Patient reports experiencing a physical decline in the past three weeks and agreeable with receiving inpatient skilled therapies upon DC from [facility]. [place] of choice provided. [id number] identified as facility of choice. This  contacted Tj Osei in admissions at [facility] with new referral. Tj Osei plans to review patient's information. It is worth noting that PT is scheduled to eval patient this afternoon. Patient will require a pre-cert should [facility] be able to meet patient's needs.  SS to continue to follow       Electronically signed by STANISLAV Barrientos on 8/28/2023 at 11:44 AM

## 2023-08-28 NOTE — H&P
Hospital Medicine  History and Physical    Patient: Sarah Sky  MRN: [id number]    CHIEF COMPLAINT:    Chief Complaint   Patient presents with    Fatigue     Progressing weakness and fatigue over the past 3 weeks       History Obtained From:  Patient, EMR  Primary Care Physician: No primary care provider on file. HISTORY OF PRESENT ILLNESS:   The patient is a 59 y.o. female with PMH of atrial fibrillation, hypothyroidism, obesity. Patient came to the emergency room with worsening weakness. She was found to have UTI. Progressive lower extremities more than upper extremities weakness over the last 2 years. Patient stated that she fell at work in [place] in 2021 and suffered pelvic fracture and back contusion. Since then she has been getting worse gradually. Patient reported having bladder incontinence. This is getting worse. Patient also started having intermittent bowel incontinence. No focal deficit. No headaches. No dizziness. No change in mental status. No fever or chills. Past Medical History:      Diagnosis Date    Atrial fibrillation ([address])     Hyperlipidemia     Hypertension     Thyroid disease        Past Surgical History:      Procedure Laterality Date    JOINT REPLACEMENT      THYROIDECTOMY         Medications Prior to Admission:    Prior to Admission medications    Medication Sig Start Date End Date Taking?  Authorizing Provider   apixaban (ELIQUIS) 5 MG TABS tablet Take by mouth 2 times daily    Historical Provider, MD   metoprolol tartrate (LOPRESSOR) 50 MG tablet Take 1 tablet by mouth 2 times daily    Historical Provider, MD   Melatonin 12 MG TABS Take by mouth    Historical Provider, MD   flecainide (TAMBOCOR) 50 MG tablet Take 1 tablet by mouth 2 times daily    Historical Provider, MD   furosemide (LASIX) 20 MG tablet Take 1 tablet by mouth daily    Historical Provider, MD   olmesartan (BENICAR) 40 MG tablet Take 1 tablet by mouth daily    Historical Provider, MD   acetaminophen narrowing of the lateral femoral compartment, with practically bone-on-bone contact. There is mild narrowing of the medial femoral compartment. There is narrowing of the patellofemoral space, with near bone-on-bone contact. No other significant osseous abnormality is seen. A small suprapatellar effusion is noted. No fracture or dislocation. Diffuse osteopenia. Significant osteo-degenerative changes, as described above. Small suprapatellar effusion. Assessment and Plan     *Progressive lower more than upper extremities weakness over the last 2 years ( since 2021 ) after she fell at work in [place] and suffered a pelvic fracture. The patient continued to have gradual worsening of weakness associated with worsening urinary incontinence. Patient also reported that she started having intermittent bowel incontinence. No recent fall or trauma. Patient reported having lower abdominal discomfort. No headaches, no head injury, no change in mental status. Patient is morbidly obese. Broad differential diagnosis including but not limited to spinal cord injury, peripheral neuropathy, deconditioning, myopathy, cerebellar dysfunction, hydrocephalus, brain occupying lesion, others. *UTI. Patient reported having urinary incontinence suspecting either overactive bladder versus neurogenic bladder. *Renal failure, unknown chronicity, no prior work-up. Kidney function improved with IV hydration suspecting acute kidney injury. Unknown baseline. Rule out the possibility of obstructive uropathy or bladder outlet obstruction or neurogenic bladder causing THERESE. No RBC in the urine to suggest acute glomerulonephritis. Small amount of protein. Proteinuria could be caused by the degree of nephrotic syndrome. In addition I could not exclude the possibility of multiple myeloma or light chain disease. *Paroxysmal A-fib. Currently patient is in sinus rhythm. *Anemia. No hematemesis or melena.   No

## 2023-08-28 NOTE — PROGRESS NOTES
stand due to right knee pain and decreased mobiltiy  Ambulation  WB Status: Unable to ambulate during evaluation        Goals  Short Term Goals  Time Frame for Short Term Goals: 1-4 days MS  Short Term Goal 1: Pt able to complete bed mobility with CGA  Short Term Goal 2: To be able to complete transfers with Min A x 1  Short Term Goal 3: Pt able to ambulated with AD with Min A x 1 for 50'x1 with a safe gait pattern  Short Term Goal 4: Pt able to tolerate 10 reps of B LE ther ex to build up pts strength  Short Term Goal 5: Pt able to ambulate up/down 6 steps with HRs and Min x 1 so that pt can safely enter/exit her sons home (where pt is currently living in split level at [facility])  Long Term Goals  Time Frame for Long Term Goals : same as STGs  Patient Goals   Patient Goals :  To be able be independent again       Therapy Time   Individual Concurrent Group Co-treatment   Time In  8:30am          Time Out  9:30am          Minutes  60 min                  Ileana Dietrich, PT

## 2023-08-28 NOTE — PROGRESS NOTES
Facility/Department: [facility] MED SURG UNIT  Occupational Therapy Initial Assessment    Name: Logan Root  : 1960  MRN: [id number]  Date of Service: 2023    Discharge Recommendations:  Continue to assess pending progress, Subacute/Skilled Nursing Facility (slow SNF)          Patient Diagnosis(es): The primary encounter diagnosis was Urinary tract infection in female. Diagnoses of Dehydration, Generalized weakness, and Atrial fibrillation, persistent ([address]) were also pertinent to this visit. Past Medical History:  has a past medical history of Atrial fibrillation ([address]), Hyperlipidemia, Hypertension, and Thyroid disease. Past Surgical History:  has a past surgical history that includes Thyroidectomy and joint replacement. Treatment Diagnosis: Decreased ADL/IADL status d/t weakness/UTI      Assessment   Performance deficits / Impairments: Decreased functional mobility ; Decreased high-level IADLs;Decreased ADL status; Decreased endurance;Decreased fine motor control;Decreased ROM; Decreased coordination;Decreased strength;Decreased balance;Decreased posture;Decreased safe awareness  Assessment: Pt is a 58 y/o F admitted to [facility] after progressive weakness over the past 3 weeks, pt reports she fell at work 2 years ago and suffered a pelvic fx and back contusion. Mobility since has been difficult with pt experiencing recent decline. Pt reports she had pain/weakness in B wrists, that she woke up with symptoms with no injury. Pt had xray which was negative for fx and she wears wrist splints PRN. PLOF pt able to perform ADLs/IADLs with increased time and effort, lately has not been able to perform d/t weakness. 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 During OT eval pt assisted to EOB and stance, R knee crepitus and very painful. L wrist demo's significantly impaired AROM, pt unable to make fist on either hand.  R hand 4th digit and L hand 2nd digit are bruised year or any fall with injury in the past year?: Yes  Receives Help From: Family  ADL Assistance: [address] Independent  Homemaking Responsibilities: Yes  Ambulation Assistance: Independent  Transfer Assistance: Independent  Additional Comments: Pt reports that she had been living with her son that has a split level as pt has not moved into her new home yet; Her bathroom is being remolded; Pt autistic 29 yo daughter lives with and is unable to help to care for pt       Objective   Pulse: 71  BP: (!) 98/45  MAP (Calculated): 63  Respirations: 16  SpO2: 97 %  O2 Device: None (Room air)          Observation/Palpation  Posture: Fair (Unable to come to upright stance)  Observation: IV LUE  Safety Devices  Type of Devices: Patient at risk for falls;Call light within reach; Bed alarm in place; Left in bed;Gait belt  Bed Mobility Training  Bed Mobility Training: Yes  Overall Level of Assistance: Moderate assistance; Total assistance (Mod A for supine>EOB, requires Max x2/Dep to return to bed.)  Interventions: Safety awareness training;Verbal cues  Supine to Sit: Moderate assistance  Sit to Supine: Maximum assistance; Total assistance;Assist X2  Scooting: Maximum assistance  Balance  Sitting: Intact  Standing: Impaired (Stood with FWW and forearms supported on device, could not stand upright d/t R knee pain.)  Transfer Training  Transfer Training: Yes  Overall Level of Assistance: Maximum assistance;Assist X2 (Pt tolerated x2 sit<>stand transfers using bariatric FWW, painful and could not come to upright stance.  Pt could not tolerate weight shifting or taking steps.)  Interventions: Safety awareness training;Verbal cues  Sit to Stand: Maximum assistance;Assist X2  Stand to Sit: Maximum assistance;Assist X2     AROM: Generally decreased, functional  Strength: Generally decreased, functional  Coordination: Generally decreased, functional (Cannot make composite fist on either hand, thumb<>finger opposition

## 2023-08-29 ENCOUNTER — APPOINTMENT (OUTPATIENT)
Age: 63
DRG: 683 | End: 2023-08-29
Attending: INTERNAL MEDICINE
Payer: COMMERCIAL

## 2023-08-29 LAB
ALT SERPL-CCNC: 36 U/L (ref 0–33)
ANION GAP SERPL CALCULATED.3IONS-SCNC: 10 MEQ/L (ref 9–15)
AST SERPL-CCNC: 29 U/L (ref 0–35)
BASOPHILS # BLD: 0.1 K/UL (ref 0–0.1)
BASOPHILS NFR BLD: 1.2 % (ref 0.1–1.2)
BUN SERPL-MCNC: 30 MG/DL (ref 8–23)
C-REACTIVE PROTEIN, HIGH SENSITIVITY: 78.9 MG/L (ref 0–5)
CALCIUM SERPL-MCNC: 9.8 MG/DL (ref 8.5–9.9)
CHLORIDE SERPL-SCNC: 110 MEQ/L (ref 95–107)
CO2 SERPL-SCNC: 22 MEQ/L (ref 20–31)
CREAT SERPL-MCNC: 0.8 MG/DL (ref 0.5–0.9)
ECHO AV AREA PEAK VELOCITY: 2.6 CM2
ECHO AV AREA PEAK VELOCITY: 2.7 CM2
ECHO AV AREA PEAK VELOCITY: 2.8 CM2
ECHO AV AREA PEAK VELOCITY: 2.9 CM2
ECHO AV AREA VTI: 2.7 CM2
ECHO AV AREA/BSA VTI: 1.1 CM2/M2
ECHO AV CUSP MM: 2.3 CM
ECHO AV MEAN GRADIENT: 5 MMHG
ECHO AV MEAN VELOCITY: 1 M/S
ECHO AV PEAK GRADIENT: 10 MMHG
ECHO AV PEAK GRADIENT: 9 MMHG
ECHO AV PEAK VELOCITY: 1.5 M/S
ECHO AV PEAK VELOCITY: 1.6 M/S
ECHO AV VTI: 30.8 CM
ECHO BSA: 2.72 M2
ECHO LA VOL 2C: 34 ML (ref 22–52)
ECHO LA VOL 2C: 34 ML (ref 22–52)
ECHO LA VOL 4C: 45 ML (ref 22–52)
ECHO LA VOL 4C: 50 ML (ref 22–52)
ECHO LA VOLUME AREA LENGTH: 43 ML
ECHO LA VOLUME INDEX AREA LENGTH: 17 ML/M2 (ref 16–34)
ECHO LV E' LATERAL VELOCITY: 15 CM/S
ECHO LV E' SEPTAL VELOCITY: 8 CM/S
ECHO LV EDV A2C: 95 ML
ECHO LV EDV A4C: 87 ML
ECHO LV EDV BP: 98 ML (ref 56–104)
ECHO LV EDV INDEX A4C: 34 ML/M2
ECHO LV EDV INDEX BP: 38 ML/M2
ECHO LV EDV NDEX A2C: 37 ML/M2
ECHO LV EJECTION FRACTION A2C: 71 %
ECHO LV EJECTION FRACTION A4C: 65 %
ECHO LV EJECTION FRACTION BIPLANE: 70 % (ref 55–100)
ECHO LV ESV A2C: 27 ML
ECHO LV ESV A4C: 31 ML
ECHO LV ESV BP: 29 ML (ref 19–49)
ECHO LV ESV INDEX A2C: 11 ML/M2
ECHO LV ESV INDEX A4C: 12 ML/M2
ECHO LV ESV INDEX BP: 11 ML/M2
ECHO LV FRACTIONAL SHORTENING: 31 % (ref 28–44)
ECHO LV INTERNAL DIMENSION DIASTOLE INDEX: 2.3 CM/M2
ECHO LV INTERNAL DIMENSION DIASTOLIC: 5.9 CM (ref 3.9–5.3)
ECHO LV INTERNAL DIMENSION SYSTOLIC INDEX: 1.6 CM/M2
ECHO LV INTERNAL DIMENSION SYSTOLIC: 4.1 CM
ECHO LV IVSD: 1.1 CM (ref 0.6–0.9)
ECHO LV IVSS: 1.1 CM
ECHO LV MASS 2D: 305.5 G (ref 67–162)
ECHO LV MASS INDEX 2D: 118.9 G/M2 (ref 43–95)
ECHO LV POSTERIOR WALL DIASTOLIC: 1.3 CM (ref 0.6–0.9)
ECHO LV POSTERIOR WALL SYSTOLIC: 1.1 CM
ECHO LV RELATIVE WALL THICKNESS RATIO: 0.44
ECHO LVOT AREA: 3.8 CM2
ECHO LVOT AV VTI INDEX: 0.71
ECHO LVOT DIAM: 2.2 CM
ECHO LVOT MEAN GRADIENT: 3 MMHG
ECHO LVOT PEAK GRADIENT: 5 MMHG
ECHO LVOT PEAK GRADIENT: 5 MMHG
ECHO LVOT PEAK VELOCITY: 1.1 M/S
ECHO LVOT PEAK VELOCITY: 1.1 M/S
ECHO LVOT STROKE VOLUME INDEX: 32.4 ML/M2
ECHO LVOT SV: 83.2 ML
ECHO LVOT VTI: 21.9 CM
ECHO MV A VELOCITY: 1.11 M/S
ECHO MV AREA PHT: 2.8 CM2
ECHO MV E DECELERATION TIME (DT): 218.5 MS
ECHO MV E VELOCITY: 0.91 M/S
ECHO MV E/A RATIO: 0.82
ECHO MV E/E' LATERAL: 6.07
ECHO MV E/E' RATIO (AVERAGED): 8.72
ECHO MV E/E' SEPTAL: 11.38
ECHO MV PRESSURE HALF TIME (PHT): 79.6 MS
ECHO PV MAX VELOCITY: 1.2 M/S
ECHO PV PEAK GRADIENT: 6 MMHG
ECHO RV INTERNAL DIMENSION: 1.4 CM
ECHO RVOT PEAK GRADIENT: 1 MMHG
ECHO RVOT PEAK VELOCITY: 0.5 M/S
ECHO TV REGURGITANT MAX VELOCITY: 2.24 M/S
ECHO TV REGURGITANT PEAK GRADIENT: 20 MMHG
EOSINOPHIL # BLD: 0.2 K/UL (ref 0–0.4)
EOSINOPHIL NFR BLD: 4.8 % (ref 0.7–5.8)
ERYTHROCYTE [DISTWIDTH] IN BLOOD BY AUTOMATED COUNT: 15.4 % (ref 11.7–14.4)
ERYTHROCYTE [SEDIMENTATION RATE] IN BLOOD BY WESTERGREN METHOD: 32 MM (ref 0–30)
GLUCOSE SERPL-MCNC: 98 MG/DL (ref 70–99)
HBA1C MFR BLD: 6.2 % (ref 4.8–5.9)
HCT VFR BLD AUTO: 24.8 % (ref 37–47)
HGB BLD-MCNC: 7.7 G/DL (ref 11.2–15.7)
HOMOCYSTEINE: 23.1 UMOL/L
IMM GRANULOCYTES # BLD: 0 K/UL
IMM GRANULOCYTES NFR BLD: 0.8 %
LACTATE BLDV-SCNC: 0.8 MMOL/L (ref 0.5–2.2)
LDH SERPL-CCNC: 99 U/L (ref 135–214)
LYMPHOCYTES # BLD: 1.3 K/UL (ref 1.2–3.7)
LYMPHOCYTES NFR BLD: 26 %
MCH RBC QN AUTO: 26.8 PG (ref 25.6–32.2)
MCHC RBC AUTO-ENTMCNC: 31 % (ref 32.2–35.5)
MCV RBC AUTO: 86.4 FL (ref 79.4–94.8)
MONOCYTES # BLD: 0.5 K/UL (ref 0.2–0.9)
MONOCYTES NFR BLD: 9.1 % (ref 4.7–12.5)
NEUTROPHILS # BLD: 2.9 K/UL (ref 1.6–6.1)
NEUTS SEG NFR BLD: 58.1 % (ref 34–71.1)
PLATELET # BLD AUTO: 414 K/UL (ref 182–369)
POTASSIUM SERPL-SCNC: 4.2 MEQ/L (ref 3.4–4.9)
RBC # BLD AUTO: 2.87 M/UL (ref 3.93–5.22)
RHEUMATOID FACTOR: 100.5 IU/ML
SODIUM SERPL-SCNC: 142 MEQ/L (ref 135–144)
T4 FREE SERPL-MCNC: 1.3 NG/DL (ref 0.84–1.68)
WBC # BLD AUTO: 5 K/UL (ref 4–10)

## 2023-08-29 PROCEDURE — 83520 IMMUNOASSAY QUANT NOS NONAB: CPT

## 2023-08-29 PROCEDURE — 84439 ASSAY OF FREE THYROXINE: CPT

## 2023-08-29 PROCEDURE — 86592 SYPHILIS TEST NON-TREP QUAL: CPT

## 2023-08-29 PROCEDURE — 2500000003 HC RX 250 WO HCPCS: Performed by: INTERNAL MEDICINE

## 2023-08-29 PROCEDURE — 80074 ACUTE HEPATITIS PANEL: CPT

## 2023-08-29 PROCEDURE — 84460 ALANINE AMINO (ALT) (SGPT): CPT

## 2023-08-29 PROCEDURE — C9113 INJ PANTOPRAZOLE SODIUM, VIA: HCPCS | Performed by: INTERNAL MEDICINE

## 2023-08-29 PROCEDURE — 84446 ASSAY OF VITAMIN E: CPT

## 2023-08-29 PROCEDURE — 97110 THERAPEUTIC EXERCISES: CPT

## 2023-08-29 PROCEDURE — 84165 PROTEIN E-PHORESIS SERUM: CPT

## 2023-08-29 PROCEDURE — 80048 BASIC METABOLIC PNL TOTAL CA: CPT

## 2023-08-29 PROCEDURE — 83605 ASSAY OF LACTIC ACID: CPT

## 2023-08-29 PROCEDURE — 93306 TTE W/DOPPLER COMPLETE: CPT

## 2023-08-29 PROCEDURE — 86141 C-REACTIVE PROTEIN HS: CPT

## 2023-08-29 PROCEDURE — 86431 RHEUMATOID FACTOR QUANT: CPT

## 2023-08-29 PROCEDURE — 97535 SELF CARE MNGMENT TRAINING: CPT

## 2023-08-29 PROCEDURE — 86789 WEST NILE VIRUS ANTIBODY: CPT

## 2023-08-29 PROCEDURE — 84450 TRANSFERASE (AST) (SGOT): CPT

## 2023-08-29 PROCEDURE — 85025 COMPLETE CBC W/AUTO DIFF WBC: CPT

## 2023-08-29 PROCEDURE — 1210000000 HC MED SURG R&B

## 2023-08-29 PROCEDURE — 86788 WEST NILE VIRUS AB IGM: CPT

## 2023-08-29 PROCEDURE — 82274 ASSAY TEST FOR BLOOD FECAL: CPT

## 2023-08-29 PROCEDURE — 84207 ASSAY OF VITAMIN B-6: CPT

## 2023-08-29 PROCEDURE — 2580000003 HC RX 258: Performed by: INTERNAL MEDICINE

## 2023-08-29 PROCEDURE — 36415 COLL VENOUS BLD VENIPUNCTURE: CPT

## 2023-08-29 PROCEDURE — 86038 ANTINUCLEAR ANTIBODIES: CPT

## 2023-08-29 PROCEDURE — 84155 ASSAY OF PROTEIN SERUM: CPT

## 2023-08-29 PROCEDURE — 6370000000 HC RX 637 (ALT 250 FOR IP): Performed by: INTERNAL MEDICINE

## 2023-08-29 PROCEDURE — 97530 THERAPEUTIC ACTIVITIES: CPT

## 2023-08-29 PROCEDURE — A4216 STERILE WATER/SALINE, 10 ML: HCPCS | Performed by: INTERNAL MEDICINE

## 2023-08-29 PROCEDURE — 83615 LACTATE (LD) (LDH) ENZYME: CPT

## 2023-08-29 PROCEDURE — 6360000002 HC RX W HCPCS: Performed by: INTERNAL MEDICINE

## 2023-08-29 PROCEDURE — 82085 ASSAY OF ALDOLASE: CPT

## 2023-08-29 PROCEDURE — 85652 RBC SED RATE AUTOMATED: CPT

## 2023-08-29 PROCEDURE — 83036 HEMOGLOBIN GLYCOSYLATED A1C: CPT

## 2023-08-29 PROCEDURE — 83090 ASSAY OF HOMOCYSTEINE: CPT

## 2023-08-29 PROCEDURE — 83921 ORGANIC ACID SINGLE QUANT: CPT

## 2023-08-29 RX ORDER — FOLIC ACID 5 MG/ML
1 INJECTION, SOLUTION INTRAMUSCULAR; INTRAVENOUS; SUBCUTANEOUS DAILY
Status: DISCONTINUED | OUTPATIENT
Start: 2023-08-29 | End: 2023-08-29

## 2023-08-29 RX ADMIN — CEFTRIAXONE 1000 MG: 1 INJECTION, POWDER, FOR SOLUTION INTRAMUSCULAR; INTRAVENOUS at 12:19

## 2023-08-29 RX ADMIN — METOPROLOL TARTRATE 25 MG: 25 TABLET, FILM COATED ORAL at 08:28

## 2023-08-29 RX ADMIN — FLECAINIDE ACETATE 50 MG: 50 TABLET ORAL at 08:28

## 2023-08-29 RX ADMIN — APIXABAN 5 MG: 5 TABLET, FILM COATED ORAL at 08:28

## 2023-08-29 RX ADMIN — SODIUM CHLORIDE: 900 INJECTION, SOLUTION INTRAVENOUS at 09:00

## 2023-08-29 RX ADMIN — PANTOPRAZOLE SODIUM 40 MG: 40 INJECTION, POWDER, FOR SOLUTION INTRAVENOUS at 06:11

## 2023-08-29 RX ADMIN — METOPROLOL TARTRATE 25 MG: 25 TABLET, FILM COATED ORAL at 20:07

## 2023-08-29 RX ADMIN — FOLIC ACID 1 MG: 5 INJECTION, SOLUTION INTRAMUSCULAR; INTRAVENOUS; SUBCUTANEOUS at 08:26

## 2023-08-29 RX ADMIN — APIXABAN 5 MG: 5 TABLET, FILM COATED ORAL at 20:07

## 2023-08-29 RX ADMIN — OXYBUTYNIN CHLORIDE 10 MG: 5 TABLET, EXTENDED RELEASE ORAL at 20:07

## 2023-08-29 RX ADMIN — FLECAINIDE ACETATE 50 MG: 50 TABLET ORAL at 20:07

## 2023-08-29 RX ADMIN — LEVOTHYROXINE SODIUM 200 MCG: 100 TABLET ORAL at 06:10

## 2023-08-29 RX ADMIN — IRON SUCROSE 200 MG: 20 INJECTION, SOLUTION INTRAVENOUS at 13:48

## 2023-08-29 RX ADMIN — ATORVASTATIN CALCIUM 40 MG: 40 TABLET, FILM COATED ORAL at 20:07

## 2023-08-29 ASSESSMENT — PAIN SCALES - GENERAL: PAINLEVEL_OUTOF10: 0

## 2023-08-29 NOTE — PROGRESS NOTES
Facility/Department: [facility] MED SURG UNIT  Daily Treatment Note  NAME: Katelyn Swain  : 1960  MRN: [id number]    Date of Service: 2023    Discharge Recommendations:  Continue to assess pending progress, Subacute/Skilled Nursing Facility (slow SNF)         Patient Diagnosis(es): The primary encounter diagnosis was Urinary tract infection in female. Diagnoses of Dehydration, Generalized weakness, and Atrial fibrillation, persistent ([address]) were also pertinent to this visit. Assessment    Assessment: Pt presents in bed and agreeable to BUE AROM and bed mobility. She reports neuralgia pain in several joints and has pain with movement. L wrist and R knee most painful with pain in neck and shoulders as well. Pt has burrell catheter d/t UTI, incontinence, decreased mobility. Pt completed BUE AROM in supine with very little movement in L wrist/hand demonstrated. Provided joint blocking to digits on L hand to improve AROM. Pt rolled L<>R in bed several trials for toilet hygiene and linen change d/t burrell was leaking. Physical therapy arrived and assisted with bed mobility to EOB and standing trails. Pt requires x2 assist and encouragement to stand. She has increased pain with standing in L wrist and R knee. Pt requires increased time d/t level of assist needed, incontinence, and extent of therapy needs. Continue to recommend slow SNF at d/c to increase independence with ADLs. Activity Tolerance: Patient limited by fatigue;Patient limited by pain; Patient limited by endurance  Discharge Recommendations: Continue to assess pending progress;Subacute/Skilled Nursing Facility (slow SNF)      Plan   Occupational Therapy Plan  Times Per Week: 4-7  Times Per Day: Once a day  Current Treatment Recommendations: Strengthening;ROM;Neuromuscular re-education;Equipment evaluation, education, & procurement; Functional mobility training;Patient/Caregiver education & training;Self-Care / ADL;Endurance training; Safety education &

## 2023-08-29 NOTE — PROGRESS NOTES
Physical Therapy  Facility/Department: [facility] MED SURG UNIT  Daily Treatment Note  NAME: Nguyễn Aguirre  : 1960  MRN: [id number]    Date of Service: 2023    Discharge Recommendations:  Continue to assess pending progress, Subacute/Skilled Nursing Facility        Patient Diagnosis(es): The primary encounter diagnosis was Urinary tract infection in female. Diagnoses of Dehydration, Generalized weakness, and Atrial fibrillation, persistent ([address]) were also pertinent to this visit. Assessment   Assessment: (P) Pt. tolerated bed mobility training and tsf training this date. Pt. reports fear and demo's some resistance to stand. Pt. able to complete bed mobility training with moderate assistance and 2 person to help pt sit up edge of bed and to lay down in bed. Reposition pt. in L sidelying for pressure relief. Pt. did tolerate stand edge of bed ~ 2 and 1/2 minutes at best with encouragement. Pt. limited by c/o R knee and left hand pain. Pt. requires mulitple cues and indicates the need for more strengthening and pregait training to benefit with advancing gait goals. Pt. demo's and reports increase fear to ambulate due to c/o weakness, pain, and fear of falling. Pt. completed 10 reps of seated LE ther ex to assist with ROM and strength. Activity Tolerance: (P) Patient limited by fatigue;Patient limited by pain; Patient limited by endurance     Plan    Physcial Therapy Plan  General Plan: (P) 5-7 times per week (1-2)  Current Treatment Recommendations: (P) Strengthening;Balance training;Functional mobility training;Transfer training;Gait training; Endurance training;Stair training;Home exercise program;Pain management; Therapeutic activities     Restrictions  Restrictions/Precautions  Restrictions/Precautions: Fall Risk, Up as Tolerated     Subjective    Subjective  Subjective: (P) Pt. up in bed c/o 6/10 R knee pain at rest. Pt. reports left wrist pain as well 7/10.   Pain: 7/10 L wrist, R knee 3: Pt able to ambulated with AD with Min A x 1 for 50'x1 with a safe gait pattern  Short Term Goal 4: Pt able to tolerate 10 reps of B LE ther ex to build up pts strength  Short Term Goal 5: Pt able to ambulate up/down 6 steps with HRs and Min x 1 so that pt can safely enter/exit her sons home (where pt is currently living in split level at sons home)  Long Term Goals  Time Frame for Long Term Goals : same as STGs  Patient Goals   Patient Goals :  To be able be independent again    Education       Therapy Time   Individual Concurrent Group Co-treatment   Time In  305         Time Out  [address] [place] [address] .[phone]

## 2023-08-29 NOTE — PROGRESS NOTES
Pt alert and oriented. Pt has a burrell cath in place. Pt has some pain in her joints. Pt medicated per mar. Pt denies any needs at this time. Pt call light in reach and bed alarm is on. Pt vitals stable. Will continue to monitor pt.   Electronically signed by Jayy Randall RN on 8/28/2023 at 9:47 PM

## 2023-08-29 NOTE — PROGRESS NOTES
Chart reviewed. Collaborated with [facility] RN House supervisor. Plan remains for patient to DC to [facility] pending insurance approval.  This  attempted to follow up with [facility]. Voice mail left with Tj Osei in admission at [facility] encouraging facility to contact this  once precert obtained.   Patient will require [phone] prior to admission to SNF

## 2023-08-29 NOTE — PROGRESS NOTES
Follow-up in a patient. Patient is feeling better overall. She continues to have difficulty ambulating. This is not acute or even subacute. This has been going on for the last 2 years and gradually getting worse. No abdominal pain. No chest pain. ROS: 12 system review otherwise is negative for acute signs or symptoms over the last 24 hrs. Exam: General appearance: alert, appears stated age and cooperative, no acute distress. Morbidly obese. Skin: Skin color, texture, turgor normal. No rashes or lesions  HEENT: Head: Normocephalic, no lesions, without obvious abnormality. Neck: no adenopathy, no carotid bruit, no JVD, supple, symmetrical, trachea midline, and thyroid not enlarged, symmetric, no tenderness/mass/nodules  Lungs: clear to auscultation bilaterally  Heart: regular rate and rhythm, S1, S2 normal, no murmur, click, rub or gallop  Abdomen: soft, non-tender; bowel sounds normal; no masses,  no organomegaly  Extremities: extremities normal, atraumatic, no cyanosis or edema  Neurologic: Mental status: Alert, oriented, thought content appropriate. Mild hyperreflexia in upper and lower extremities symmetrically. Patient is able to lift upper arms against gravity and resistance.  4-5/5. Lower extremities: The right 1 is 4/5 on the left one is 3-4/5. No sensory loss. Assessment and plan:     *Progressive lower more than upper extremities weakness over the last 2 years ( since 2021 ) after she fell at work in [place] and suffered a pelvic fracture. Prior to that the patient had bilateral hip replacement. The patient continued to have gradual worsening of weakness associated with worsening urinary incontinence. Patient also reported that she started having intermittent bowel incontinence. No recent fall or trauma. No headaches, no head injury, no change in mental status. Patient also reported that she has arthritis in both knees. She was waiting to get right knee replacement.   I consulted etiology anemia. Her folate is low confirming folate deficiency. Patient will be started on iron and folate supplementation. *Elevated ferritin and C-reactive protein. No clinical evidence of infection other than her UTI. I would recommend to repeat both levels after patient completes her antibiotic treatment. Blood cultures pending. *Severe morbid obesity. Patient weighs 340 pounds, BMI is 50. *Hypothyroidism. TSH is slightly elevated but free T4 is normal.  Continue same Synthroid dose. I may or may not have addressed all of this pt symptoms, medical issues, abnormal labs and findings. Pt will need additional work up, investigation, testing, surveillance, and treatment to be done at a later time and date during this hospitalization or post discharge by PCP and other out pt providers. Portion of patient care is managed by other providers. Please refer to their notes for details. I will follow specialists recommendations given their expertise in specialty subject matters.

## 2023-08-29 NOTE — CONSULTS
Tanvir Martinez  1960   (59 y.o.)  female  8/28/2023 11:16 PM    CONSULTATION -- NEUROLOGY    Impression, Comments, Recommendations  and Patient Instructions --    Weakness    Scattered pattern and variable severity. No evident single cause. Unclear how much is antalgic. Myopathy panel. Given the arthralgias, ortho consult may be warranted. Agree with rehab     Peripheral polyneuropathy        Neuropathy panel. Consultation requested by Dr. Wolf Wasserman MD.    Subjective     CC WEAKNESS   Tx    AEs    Hx Subacutely progressive weakness over the past 2 weeks or so to the point that pt was unable to stand even with the assistance of a lift chair. Pt audra moved here from [place]. Oct 2021 pt fell, fx pelvis. Instr to use rollator until Apr 2022 when she was instructed to weak off it. However, even afterward, pt often could not use only a cane and had to return to it using the rollator. Onset    Semeiology    Imaging MR C-s - normal cord, deepthi sten C5-6 mod-sev B  MR T-s - normal cord  MR L-s - nromal cord, anterolisthesis L4-5 gr I;  osseous haemangiomata   Testing    Failed          Objective   Physical Examination --  The patient is in no acute distress. Rather unkempt; morbidly obese. Vital signs: reviewed as per the graphic sheet. Skin: examination demonstrated no evident lesions. HEENT: bruits absent. Heart: cardiac rhythm regular, with no murmur. Lungs: breath sounds normal throughout. .    Abdomen: grossly normal.   Extremities: no clubbing, cyanosis, or edema. Neurological Examination --   Mental status: The patient is alert. Orientation is to person, place, and time. Thought content and form is normal. Comprehension is normal. Phonation, articulation, resonance, and prosody are normal.    CNN: Pupils are equal, 4 mm OU, round, and normally reactive to light and accommodation, both directly and consensually.  Visual fields are grossly full

## 2023-08-30 LAB
ALDOLASE SERPL-CCNC: 2.7 U/L (ref 1.2–7.6)
ANION GAP SERPL CALCULATED.3IONS-SCNC: 12 MEQ/L (ref 9–15)
BACTERIA UR CULT: NORMAL
BASOPHILS # BLD: 0.1 K/UL (ref 0–0.1)
BASOPHILS NFR BLD: 0.7 % (ref 0.1–1.2)
BUN SERPL-MCNC: 19 MG/DL (ref 8–23)
C-REACTIVE PROTEIN, HIGH SENSITIVITY: 48.9 MG/L (ref 0–5)
CALCIUM SERPL-MCNC: 10.2 MG/DL (ref 8.5–9.9)
CHLORIDE SERPL-SCNC: 103 MEQ/L (ref 95–107)
CO2 SERPL-SCNC: 23 MEQ/L (ref 20–31)
CREAT SERPL-MCNC: 0.87 MG/DL (ref 0.5–0.9)
DEPRECATED KAPPA LC FREE/LAMBDA SER: 1.81 {RATIO} (ref 0.26–1.65)
EOSINOPHIL # BLD: 0.2 K/UL (ref 0–0.4)
EOSINOPHIL NFR BLD: 3.5 % (ref 0.7–5.8)
ERYTHROCYTE [DISTWIDTH] IN BLOOD BY AUTOMATED COUNT: 15.6 % (ref 11.7–14.4)
GLUCOSE SERPL-MCNC: 170 MG/DL (ref 70–99)
HAV IGM SER IA-ACNC: NONREACTIVE
HCT VFR BLD AUTO: 28.5 % (ref 37–47)
HEMOCCULT STL QL IA: NORMAL
HEPATITIS B CORE IGM ANTIBODY: NONREACTIVE
HEPATITIS B SURF AG,XHBAGS: NONREACTIVE
HEPATITIS C ANTIBODY: NONREACTIVE
HGB BLD-MCNC: 8.8 G/DL (ref 11.2–15.7)
IMM GRANULOCYTES # BLD: 0.2 K/UL
IMM GRANULOCYTES NFR BLD: 2.3 %
KAPPA LC FREE SER-MCNC: 92.8 MG/L (ref 3.3–19.4)
LAMBDA LC FREE SERPL-MCNC: 51.14 MG/L (ref 5.71–26.3)
LYMPHOCYTES # BLD: 1.8 K/UL (ref 1.2–3.7)
LYMPHOCYTES NFR BLD: 26.5 %
MCH RBC QN AUTO: 26.7 PG (ref 25.6–32.2)
MCHC RBC AUTO-ENTMCNC: 30.9 % (ref 32.2–35.5)
MCV RBC AUTO: 86.6 FL (ref 79.4–94.8)
MONOCYTES # BLD: 0.4 K/UL (ref 0.2–0.9)
MONOCYTES NFR BLD: 6.3 % (ref 4.7–12.5)
NEUTROPHILS # BLD: 4.2 K/UL (ref 1.6–6.1)
NEUTS SEG NFR BLD: 60.7 % (ref 34–71.1)
PLATELET # BLD AUTO: 420 K/UL (ref 182–369)
POTASSIUM SERPL-SCNC: 3.9 MEQ/L (ref 3.4–4.9)
RBC # BLD AUTO: 3.29 M/UL (ref 3.93–5.22)
RPR SER QL: NORMAL
SODIUM SERPL-SCNC: 138 MEQ/L (ref 135–144)
WBC # BLD AUTO: 6.9 K/UL (ref 4–10)

## 2023-08-30 PROCEDURE — C9113 INJ PANTOPRAZOLE SODIUM, VIA: HCPCS | Performed by: INTERNAL MEDICINE

## 2023-08-30 PROCEDURE — 6370000000 HC RX 637 (ALT 250 FOR IP): Performed by: INTERNAL MEDICINE

## 2023-08-30 PROCEDURE — 97535 SELF CARE MNGMENT TRAINING: CPT

## 2023-08-30 PROCEDURE — 97110 THERAPEUTIC EXERCISES: CPT

## 2023-08-30 PROCEDURE — A4216 STERILE WATER/SALINE, 10 ML: HCPCS | Performed by: INTERNAL MEDICINE

## 2023-08-30 PROCEDURE — 6360000002 HC RX W HCPCS: Performed by: INTERNAL MEDICINE

## 2023-08-30 PROCEDURE — 97530 THERAPEUTIC ACTIVITIES: CPT

## 2023-08-30 PROCEDURE — 1210000000 HC MED SURG R&B

## 2023-08-30 PROCEDURE — 2500000003 HC RX 250 WO HCPCS: Performed by: INTERNAL MEDICINE

## 2023-08-30 PROCEDURE — 2580000003 HC RX 258: Performed by: INTERNAL MEDICINE

## 2023-08-30 RX ADMIN — ATORVASTATIN CALCIUM 40 MG: 40 TABLET, FILM COATED ORAL at 20:33

## 2023-08-30 RX ADMIN — METOPROLOL TARTRATE 25 MG: 25 TABLET, FILM COATED ORAL at 20:33

## 2023-08-30 RX ADMIN — APIXABAN 5 MG: 5 TABLET, FILM COATED ORAL at 09:40

## 2023-08-30 RX ADMIN — Medication 10 ML: at 20:30

## 2023-08-30 RX ADMIN — FLECAINIDE ACETATE 50 MG: 50 TABLET ORAL at 20:33

## 2023-08-30 RX ADMIN — Medication 10 ML: at 13:46

## 2023-08-30 RX ADMIN — LEVOTHYROXINE SODIUM 200 MCG: 100 TABLET ORAL at 05:20

## 2023-08-30 RX ADMIN — FLECAINIDE ACETATE 50 MG: 50 TABLET ORAL at 09:40

## 2023-08-30 RX ADMIN — FOLIC ACID 1 MG: 5 INJECTION, SOLUTION INTRAMUSCULAR; INTRAVENOUS; SUBCUTANEOUS at 10:10

## 2023-08-30 RX ADMIN — PANTOPRAZOLE SODIUM 40 MG: 40 INJECTION, POWDER, FOR SOLUTION INTRAVENOUS at 05:20

## 2023-08-30 RX ADMIN — CEFTRIAXONE 1000 MG: 1 INJECTION, POWDER, FOR SOLUTION INTRAMUSCULAR; INTRAVENOUS at 10:10

## 2023-08-30 RX ADMIN — APIXABAN 5 MG: 5 TABLET, FILM COATED ORAL at 20:33

## 2023-08-30 RX ADMIN — OXYBUTYNIN CHLORIDE 10 MG: 5 TABLET, EXTENDED RELEASE ORAL at 20:33

## 2023-08-30 RX ADMIN — METOPROLOL TARTRATE 25 MG: 25 TABLET, FILM COATED ORAL at 09:40

## 2023-08-30 RX ADMIN — IRON SUCROSE 200 MG: 20 INJECTION, SOLUTION INTRAVENOUS at 13:45

## 2023-08-30 RX ADMIN — Medication 10 ML: at 20:35

## 2023-08-30 ASSESSMENT — PAIN SCALES - GENERAL: PAINLEVEL_OUTOF10: 0

## 2023-08-30 NOTE — PROGRESS NOTES
Subjectively the patient is feeling much better. Improved sense of strength and stamina. No chest pain or palpitation. ROS: 12 system review otherwise is negative for acute signs or symptoms over the last 24 hrs. Exam: General appearance: alert, appears stated age and cooperative, no acute distress. Morbidly obese. Skin: Skin color, texture, turgor normal. No rashes or lesions  HEENT: Head: Normocephalic, no lesions, without obvious abnormality. Neck: no adenopathy, no carotid bruit, no JVD, supple, symmetrical, trachea midline, and thyroid not enlarged, symmetric, no tenderness/mass/nodules  Lungs: clear to auscultation bilaterally  Heart: regular rate and rhythm, S1, S2 normal, no murmur, click, rub or gallop  Abdomen: soft, non-tender; bowel sounds normal; no masses,  no organomegaly  Extremities: extremities normal, atraumatic, no cyanosis or edema  Neurologic: Mental status: Alert, oriented, thought content appropriate. Mild hyperreflexia in upper and lower extremities symmetrically. Patient is able to lift upper arms against gravity and resistance.  4-5/5. Lower extremities: The right 1 is 4/5 on the left one is 3-4/5. No sensory loss. Assessment and plan:     *Progressive lower more than upper extremities weakness over the last 2 years ( since 2021 ) after she fell at work in [place] and suffered a pelvic fracture. Prior to that the patient had bilateral hip replacement. The patient continued to have gradual worsening of weakness associated with worsening urinary incontinence. Patient also reported that she started having intermittent bowel incontinence. No recent fall or trauma. No headaches, no head injury, no change in mental status. Patient also reported that she has arthritis in both knees. She was waiting to get right knee replacement. I consulted neurology to evaluate her progressive weakness. Please refer to his note for details.   I requested MRI of the cervical, thoracic

## 2023-08-30 NOTE — PLAN OF CARE
Pt is in bed on laptop. She is A/O/x 4, She is on BR and is 2 Max Assist. She takes her po medications whole w/ water. Last BM on 08/29/2023, no c/o constipation, abd pain, or diarrhea. Stool sample taken to lab. She has a 22 ga left AC that is, clean, dry, and intact. She has a burrell draining yellow urine, emptied 1650 total for shift. No c/o pain at this time. Call light is w/in reach. She is in bed resting comfortably. Will continue to monitor.

## 2023-08-30 NOTE — PROGRESS NOTES
Chart reviewed. Patient's care discussed in daily quality rounds. Plan remains for patient to go to [facility] pending insurance approval.  This  spoke with Sharon Hair in admissions at [facility]. Sharon Hair reports that pre-cert is still pending and will notify [facility] as soon as disposition obtained. Patient updated. SS to continue to follow and assist with completing [id number] prior to admission to SNF.

## 2023-08-30 NOTE — PROGRESS NOTES
Physical Therapy  Facility/Department: [facility] MED SURG UNIT  Daily Treatment Note  NAME: Logan Root  : 1960  MRN: [id number]    Date of Service: 2023    Discharge Recommendations:  Continue to assess pending progress, Subacute/Skilled Nursing Facility        Patient Diagnosis(es): The primary encounter diagnosis was Urinary tract infection in female. Diagnoses of Dehydration, Generalized weakness, and Atrial fibrillation, persistent ([address]) were also pertinent to this visit. Assessment   Assessment: (P) Pt. tolerated fair with intervention with 2 person assist to get up edge of bed and tolerate one sit to stand tsf. Pt. moris's slight imrpove stand tolerance up to 3 minutes and 40 seconds with stand. Pt. able to maintain trunk upright a little longer ~ 1 minute before needing to use elbows for support with encouragement. Pt. moris's R knee crepitus with weight shifting and side stepping to head of bed. Focus on core strength and ROM edge of bed following with 0 LOB. Pt. requires max assistance to total with getting back into bed post intervention. Extra time needed to engage pt with each task. Activity Tolerance: (P) Patient limited by fatigue;Patient limited by pain; Patient limited by endurance     Plan    Physcial Therapy Plan  General Plan: 5-7 times per week (1-2)  Current Treatment Recommendations: Strengthening;Balance training;Functional mobility training;Transfer training;Gait training; Endurance training;Stair training;Home exercise program;Pain management; Therapeutic activities     Restrictions  Restrictions/Precautions  Restrictions/Precautions: Fall Risk, Up as Tolerated     Subjective    Subjective  Subjective: Pt. up in bed and agreeable to therapy. Pt. reports R knee and L hand pain. Objective   Vitals     Bed Mobility Training  Bed Mobility Training: Yes  Supine to Sit: Moderate assistance;Minimum assistance; Additional time;Assist X2;Assist X1  Sit to Supine: (P) Maximum assistance; Total assistance;Assist X2;Moderate assistance  Scooting: Moderate assistance;Minimum assistance; Additional time (Moderate to max assistance to scoot to R in supine with engaging self function. Mod to min assistance with scooting to edge of bed. Pt. needs increase time to self engage function.)  Transfer Training  Transfer Training: Yes  Sit to Stand: (P) Moderate assistance;Assist X2 (Moderate Assist from elevated height of bed > or = to 26 inches. Max assist of 2 person with lower surface.)  Stand to Sit: (P) Moderate assistance;Assist X2     PT Exercises  Static Sitting Balance Exercises: (P) Static sit upright ~ 15 minutes without UE or trunk support and 0 LOB. Dynamic Sitting Balance Exercises: (P) Seated trunk flexion/extension x 10', elbow to elbow for lateral trunk flexion x 10', and rotation with UE reach across midline x 10'. 0 LOB with no UE support needed. Other Specialty Interventions  Other Treatments/Modalities: (P) Static stand edge of bed with walker with maintaining hand support ~ 2 minutes with slight progress to engage side to side weight shift on each LE. Pt. c/o of R knee pain increase and demo's crepitus with R knee. Further through stand needed to use elbows for support to advance side 3 small side steps to head of bed. Pt. stood at best 3 minutess and 40 seconds until sitting. Goals  Short Term Goals  Time Frame for Short Term Goals: 1-4 days MS  Short Term Goal 1: Pt able to complete bed mobility with CGA  Short Term Goal 2:  To be able to complete transfers with Min A x 1  Short Term Goal 3: Pt able to ambulated with AD with Min A x 1 for 50'x1 with a safe gait pattern  Short Term Goal 4: Pt able to tolerate 10 reps of B LE ther ex to build up pts strength  Short Term Goal 5: Pt able to ambulate up/down 6 steps with HRs and Min x 1 so that pt can safely enter/exit her sons home (where pt is currently living in split level at [facility])  Long Term Goals  Time Frame for

## 2023-08-30 NOTE — PROGRESS NOTES
Facility/Department: [facility] MED SURG UNIT  Daily Treatment Note  NAME: Allegra Mosqueda  : 1960  MRN: [id number]    Date of Service: 2023    Discharge Recommendations:  Continue to assess pending progress, Subacute/Skilled Nursing Facility (slow SNF)         Patient Diagnosis(es): The primary encounter diagnosis was Urinary tract infection in female. Diagnoses of Dehydration, Generalized weakness, and Atrial fibrillation, persistent ([address]) were also pertinent to this visit. Assessment    Assessment: Pt presents in bed, agreeable for OT intervention. Physical therapy present to align with pt's mobility levels. Pt assisted to EOB for grooming followed by standing tasks. L wrist and R knee limiting factors in activity tolerance. Pt able to side step with encouragement towards HOB. Once back to supine she completed B hand and wrist AROM exercises, demo's improvement with joint mobility. Pt will all needs upon OT departure. Activity Tolerance: Patient limited by fatigue;Patient limited by pain; Patient limited by endurance  Discharge Recommendations: Continue to assess pending progress;Subacute/Skilled Nursing Facility (slow SNF)      Plan   Occupational Therapy Plan  Times Per Week: 4-7  Times Per Day: Once a day  Current Treatment Recommendations: Strengthening;ROM;Neuromuscular re-education;Equipment evaluation, education, & procurement; Functional mobility training;Patient/Caregiver education & training;Self-Care / ADL;Endurance training; Safety education & training;Positioning;Balance training;Home management training     Restrictions   2 A    Subjective   Subjective  Subjective: \"I did it! \"  Pain: R knee 7/10, L wrist 6/10 during WB  Orientation  Overall Orientation Status: Within Functional Limits  Orientation Level: Oriented X4  Pain: R knee 7/10, L wrist 6/10 during WB  Cognition  Overall Cognitive Status: WFL        Objective    Bed Mobility Training  Bed Mobility Training: Yes  Overall Level of

## 2023-08-31 VITALS
OXYGEN SATURATION: 96 % | RESPIRATION RATE: 18 BRPM | DIASTOLIC BLOOD PRESSURE: 62 MMHG | TEMPERATURE: 98.1 F | HEART RATE: 73 BPM | BODY MASS INDEX: 43.4 KG/M2 | WEIGHT: 293 LBS | SYSTOLIC BLOOD PRESSURE: 123 MMHG | HEIGHT: 69 IN

## 2023-08-31 LAB
ALBUMIN SERPL-MCNC: 2.04 G/DL (ref 3.75–5.01)
ALPHA1 GLOB SERPL ELPH-MCNC: 0.5 G/DL (ref 0.19–0.46)
ALPHA2 GLOB SERPL ELPH-MCNC: 1.15 G/DL (ref 0.48–1.05)
B-GLOBULIN SERPL ELPH-MCNC: 0.87 G/DL (ref 0.48–1.1)
GAMMA GLOB SERPL ELPH-MCNC: 1.04 G/DL (ref 0.62–1.51)
INTERPRETATION SERPL IFE-IMP: ABNORMAL
NUCLEAR IGG SER QL IA: NORMAL
PROT SERPL-MCNC: 5.6 G/DL (ref 6.3–8.2)
PROTEIN ELECTROPHORESIS, SERUM: ABNORMAL
WNV IGG SER-ACNC: 0.33 IV
WNV IGM SER-ACNC: 0.12 IV

## 2023-08-31 PROCEDURE — 97535 SELF CARE MNGMENT TRAINING: CPT

## 2023-08-31 PROCEDURE — 2580000003 HC RX 258: Performed by: INTERNAL MEDICINE

## 2023-08-31 PROCEDURE — 2500000003 HC RX 250 WO HCPCS: Performed by: INTERNAL MEDICINE

## 2023-08-31 PROCEDURE — C9113 INJ PANTOPRAZOLE SODIUM, VIA: HCPCS | Performed by: INTERNAL MEDICINE

## 2023-08-31 PROCEDURE — 97530 THERAPEUTIC ACTIVITIES: CPT

## 2023-08-31 PROCEDURE — 6360000002 HC RX W HCPCS: Performed by: INTERNAL MEDICINE

## 2023-08-31 PROCEDURE — 97110 THERAPEUTIC EXERCISES: CPT

## 2023-08-31 PROCEDURE — A4216 STERILE WATER/SALINE, 10 ML: HCPCS | Performed by: INTERNAL MEDICINE

## 2023-08-31 PROCEDURE — 6370000000 HC RX 637 (ALT 250 FOR IP): Performed by: INTERNAL MEDICINE

## 2023-08-31 RX ORDER — CEFUROXIME AXETIL 500 MG/1
500 TABLET ORAL 2 TIMES DAILY
Qty: 10 TABLET | Refills: 0 | DISCHARGE
Start: 2023-08-31 | End: 2023-09-05

## 2023-08-31 RX ORDER — LISINOPRIL 2.5 MG/1
2.5 TABLET ORAL DAILY
DISCHARGE
Start: 2023-08-31

## 2023-08-31 RX ORDER — FERROUS SULFATE 325(65) MG
325 TABLET ORAL 2 TIMES DAILY
Qty: 30 TABLET | Refills: 3 | DISCHARGE
Start: 2023-08-31

## 2023-08-31 RX ORDER — ACETAMINOPHEN 325 MG/1
650 TABLET ORAL EVERY 6 HOURS PRN
Refills: 0 | DISCHARGE
Start: 2023-08-31

## 2023-08-31 RX ORDER — POLYETHYLENE GLYCOL 3350 17 G/17G
17 POWDER, FOR SOLUTION ORAL DAILY PRN
Refills: 0 | DISCHARGE
Start: 2023-08-31 | End: 2023-09-30

## 2023-08-31 RX ORDER — FOLIC ACID 1 MG/1
1 TABLET ORAL DAILY
Refills: 0 | DISCHARGE
Start: 2023-08-31

## 2023-08-31 RX ADMIN — FLECAINIDE ACETATE 50 MG: 50 TABLET ORAL at 08:20

## 2023-08-31 RX ADMIN — LEVOTHYROXINE SODIUM 200 MCG: 100 TABLET ORAL at 06:13

## 2023-08-31 RX ADMIN — CEFTRIAXONE 1000 MG: 1 INJECTION, POWDER, FOR SOLUTION INTRAMUSCULAR; INTRAVENOUS at 10:39

## 2023-08-31 RX ADMIN — METOPROLOL TARTRATE 25 MG: 25 TABLET, FILM COATED ORAL at 08:20

## 2023-08-31 RX ADMIN — APIXABAN 5 MG: 5 TABLET, FILM COATED ORAL at 08:20

## 2023-08-31 RX ADMIN — PANTOPRAZOLE SODIUM 40 MG: 40 INJECTION, POWDER, FOR SOLUTION INTRAVENOUS at 06:12

## 2023-08-31 RX ADMIN — FOLIC ACID 1 MG: 5 INJECTION, SOLUTION INTRAMUSCULAR; INTRAVENOUS; SUBCUTANEOUS at 08:23

## 2023-08-31 NOTE — PROGRESS NOTES
This  received communication from [facility] RN [employer] supervisor reporting that insurance has approved admission to [facility]. Plan is for patient to admit to [facility] SN[facility] on this date. This  [facility] [id number] [facility] [id number] [facility] [address] [address] [address] No further follow up from NEELIMA ALEXIS [facility] requested at this time.

## 2023-08-31 NOTE — DISCHARGE SUMMARY
Hospital Medicine Discharge Summary    Shalonda Escalante  :  1960  MRN:  [id number]    Admit date:  2023  Discharge date:  2023    Admitting Physician:  Brian Mohamud MD  Primary Care Physician:  No primary care provider on file. Discharge Diagnoses:      *Progressive lower more than upper extremities weakness over the last 2 years ( since  ) after she fell at work in [place] and suffered a pelvic fracture. Prior to that the patient had bilateral hip replacement. The patient continued to have gradual worsening of weakness associated with worsening urinary incontinence. Patient also reported that she started having intermittent bowel incontinence. No recent fall or trauma. No headaches, no head injury, no change in mental status. Patient also reported that she has arthritis in both knees. She was waiting to get right knee replacement. I consulted neurology to evaluate her progressive weakness. Please refer to his note for details. I requested MRI of the cervical, thoracic and lumbar spine. Please refer to report and imaging for details. No cord compression is seen but multilevel disc degeneration and neuroforaminal narrowing. Patient will need to follow-up with the spine surgery team postdischarge. Her progressive worsening weakness over the last 2 weeks could have been triggered by dehydration, volume loss and THERESE as well as UTI. Patient would need to follow-up with neurology to complete neuropathy and myopathy work-up. She may need to have an EMG and NCV. We submitted referral for patient to go to skilled. Patient shows [place]. Patient will be discharged there once approved by her insurance company. Patient would likely need to have additional spine, neuro, Ortho evaluation postdischarge. *UTI. Patient reported having urinary incontinence. Bladder scan came back less than 150 mL. I suspect that her incontinence is overactive rather than neurogenic bladder.   Changed

## 2023-08-31 NOTE — DISCHARGE INSTRUCTIONS
Follow up with Dr. Yimi Álvarez in the next 7 days or sooner if needed. Please return to ER or call 911 if you develop any significant signs or symptoms. I may or may not have addressed all of your symptoms, medical issues,  Illnesses, or all of the abnormal blood work or imaging therefore please ask your PCP and other specialists to obtain [facility] record entirely to follow up on all of your symptoms, illnesses, abnormal labs, imaging and findings that I have and have not addressed during your hospitalization. I ordered a lot of blood tests to investigate your situation. Some of these blood tests are not back. He is asking your new primary care doctor Rene review record entirely from a Tuesday to follow-up on needed medical care and abnormalities that have not resulted while you are here. Please follow-up with spine, orthopedic, blood specialists as well as with Dr. Holger Pak to complete needed medical and surgical care as deemed to be appropriate in the outpatient setting. Discharging you from the hospital does not mean that your medical care ends here and now. You may still need to have additional work up, investigation, monitoring, surveillance, and treatment to be handled from this point on by in the out patient setting by  out patient providers including your PCP, Specialists and other healthcare providers. For medication questions, contact your retail pharmacy and your PCP. Your medical team at [facility] ([facility] appreciates the opportunity to work with you to get well!     Scottyyamila Sawant MD

## 2023-08-31 NOTE — PLAN OF CARE
Pt is in bed watching Tv. She is A/O/x 4, She is on BR and is 2 Max Assist. She takes her po medications whole w/ water. Last BM on 08/29/2023, no c/o constipation, abd pain, or diarrhea. She has a 22 ga left AC that is, clean, dry, and intact. She has a burrell draining yellow urine, emptied     total for shift. No c/o pain at this time. Call light is w/in reach. She is in bed resting comfortably. Will continue to monitor.

## 2023-08-31 NOTE — PROGRESS NOTES
Physical Therapy  Facility/Department: [facility] MED SURG UNIT  Daily Treatment Note  NAME: Jean Paul Chowdhury  : 1960  MRN: [id number]    Date of Service: 2023    Discharge Recommendations:  Continue to assess pending progress, Subacute/Skilled Nursing Facility        Patient Diagnosis(es): The primary encounter diagnosis was Urinary tract infection in female. Diagnoses of Dehydration, Generalized weakness, Atrial fibrillation, persistent ([address]), Infective urethritis, and Weakness were also pertinent to this visit. Assessment   Assessment: (P) Pt. tolerated focus on LE strengthening with all available planes of motion. Pt. able to perform AP and SAQ with AROM and limited ROM. Pt. demo's increase difficulty tolerating SAQ with kne pain. Pt. requires increase AAROM to engage LE reps of 10' with more self engagement during eccentric ROM with SLR. Minimal effot demo'd with hip abduction and adduction. Pt. demo's slow progress; however, pt. able to self weight shift to scoot to edge of bed with supporting R knee. Improve self function also demo'd with supine <->  sit with additional time and less assist. Pt. struggles more with elevating legs onto bed. Pt. was able to partially engage moving LE side to side and bridging self with B LE this date to scoot center of bed to realign self. Pt. stood at best 4 minutes and 25 seconds with 0 LOB and appears calm with increase fatigue to sit post stand time. Pt. limited with left hand weight bear and R knee weight bear. Pt. able to side step with better effort this date as well to reposition self to head of bed before sitting. Activity Tolerance: (P) Patient limited by fatigue;Patient limited by pain; Patient limited by endurance     Plan    Physcial Therapy Plan  General Plan: (P) 5-7 times per week (1-2)  Current Treatment Recommendations: (P) Strengthening;Balance training;Functional mobility training;Transfer training;Gait training; Endurance training;Stair training;Home tolerate 10 reps of B LE ther ex to build up pts strength  Short Term Goal 5: Pt able to ambulate up/down 6 steps with HRs and Min x 1 so that pt can safely enter/exit her sons home (where pt is currently living in split level at sons home)  Long Term Goals  Time Frame for Long Term Goals : same as STGs  Patient Goals   Patient Goals :  To be able be independent again    Education       Therapy Time   Individual Concurrent Group Co-treatment   Time In  1230         Time Out  1225         Minutes  [address], [place] .[id number]

## 2023-08-31 NOTE — PROGRESS NOTES
Facility/Department: [facility] MED SURG UNIT  Daily Treatment Note  NAME: Caren Burroughs  : 1960  MRN: [id number]    Date of Service: 2023    Discharge Recommendations:  Continue to assess pending progress, Subacute/Skilled Nursing Facility (slow SNF)         Patient Diagnosis(es): The primary encounter diagnosis was Urinary tract infection in female. Diagnoses of Dehydration, Generalized weakness, Atrial fibrillation, persistent ([address]), Infective urethritis, and Weakness were also pertinent to this visit. Assessment    Assessment: Pt presents in bed, agreeable to sit EOB and progress to standing tasks with FWW. Improvement in bed mobility although it is incremental, pt putting forth best effort. Pt stood with FWW for increased duration of time with encouragement to stand tall. Pt having increased pain in R knee and L wrist during WB. Pt assisted back to supine with all needs within reach upon OT departure. Activity Tolerance: Patient limited by fatigue;Patient limited by pain; Patient limited by endurance  Discharge Recommendations: Continue to assess pending progress;Subacute/Skilled Nursing Facility (slow SNF)      Plan   Occupational Therapy Plan  Times Per Week: 4-7  Times Per Day: Once a day  Current Treatment Recommendations: Strengthening;ROM;Neuromuscular re-education;Equipment evaluation, education, & procurement; Functional mobility training;Patient/Caregiver education & training;Self-Care / ADL;Endurance training; Safety education & training;Positioning;Balance training;Home management training     Restrictions   2 person A    Subjective   Subjective  Subjective: \"I was able to roll and bridge last night much better. \"  Pain: R knee 6/10, L wrist 5/10 during WB  Orientation  Overall Orientation Status: Within Functional Limits  Orientation Level: Oriented X4  Pain: R knee 6/10, L wrist 5/10 during WB  Cognition  Overall Cognitive Status: [facility]        Objective    Bed Mobility Training  Bed Mobility

## 2023-08-31 NOTE — PROGRESS NOTES
Report called to [facility], pickup time scheduled by supervisor with physicians ambulance for 1:30pm. Pt working with therapy at this time.

## 2023-08-31 NOTE — PROGRESS NOTES
Patient in bed working on her laptop. Medicated as ordered. Pt is pleasant, alert and oriented and states she is feeling better. Pt aware we are waiting on a precert for her to go to [facility].

## 2023-09-01 ENCOUNTER — OFFICE VISIT (OUTPATIENT)
Dept: GERIATRIC MEDICINE | Age: 63
End: 2023-09-01

## 2023-09-01 DIAGNOSIS — R26.2 DIFFICULTY IN WALKING: ICD-10-CM

## 2023-09-01 DIAGNOSIS — R52 PAIN: ICD-10-CM

## 2023-09-01 DIAGNOSIS — I10 PRIMARY HYPERTENSION: ICD-10-CM

## 2023-09-01 DIAGNOSIS — R52 PAIN: Primary | ICD-10-CM

## 2023-09-01 DIAGNOSIS — R53.1 WEAKNESS: Primary | ICD-10-CM

## 2023-09-01 DIAGNOSIS — I48.0 PAROXYSMAL ATRIAL FIBRILLATION (HCC): ICD-10-CM

## 2023-09-01 DIAGNOSIS — N30.00 ACUTE CYSTITIS WITHOUT HEMATURIA: ICD-10-CM

## 2023-09-01 LAB
A-TOCOPHEROL VIT E SERPL-MCNC: 7.1 MG/L (ref 5.5–18)
ANION GAP SERPL CALCULATED.3IONS-SCNC: 13 MEQ/L (ref 9–15)
BASOPHILS # BLD: 0 K/UL (ref 0–0.2)
BASOPHILS NFR BLD: 0 %
BETA+GAMMA TOCOPHEROL SERPL-MCNC: 1.1 MG/L (ref 0–6)
BUN SERPL-MCNC: 13 MG/DL (ref 8–23)
CALCIUM SERPL-MCNC: 9.6 MG/DL (ref 8.5–9.9)
CHLORIDE SERPL-SCNC: 101 MEQ/L (ref 95–107)
CO2 SERPL-SCNC: 24 MEQ/L (ref 20–31)
COMMENT: NORMAL
CREAT SERPL-MCNC: 0.7 MG/DL (ref 0.5–0.9)
EOSINOPHIL # BLD: 0.3 K/UL (ref 0–0.7)
EOSINOPHIL NFR BLD: 4 %
ERYTHROCYTE [DISTWIDTH] IN BLOOD BY AUTOMATED COUNT: 16.1 % (ref 11.5–14.5)
GLUCOSE SERPL-MCNC: 90 MG/DL (ref 70–99)
HCT VFR BLD AUTO: 27.8 % (ref 37–47)
HGB BLD-MCNC: 9.1 G/DL (ref 12–16)
HYPOCHROMIA BLD QL SMEAR: ABNORMAL
LYMPHOCYTES # BLD: 1.4 K/UL (ref 1–4.8)
LYMPHOCYTES NFR BLD: 19 %
MCH RBC QN AUTO: 27.4 PG (ref 27–31.3)
MCHC RBC AUTO-ENTMCNC: 32.6 % (ref 33–37)
MCV RBC AUTO: 84 FL (ref 79.4–94.8)
MISCELLANEOUS LAB TEST RESULT: NORMAL
MONOCYTES # BLD: 0.7 K/UL (ref 0.2–0.8)
MONOCYTES NFR BLD: 10 %
MYELOCYTES NFR BLD MANUAL: 2 %
NEUTROPHILS # BLD: 4.7 K/UL (ref 1.4–6.5)
NEUTS BAND NFR BLD MANUAL: 1 % (ref 5–11)
NEUTS SEG NFR BLD: 63 %
PLATELET # BLD AUTO: 438 K/UL (ref 130–400)
PLATELET BLD QL SMEAR: ABNORMAL
POTASSIUM SERPL-SCNC: 3.9 MEQ/L (ref 3.4–4.9)
RBC # BLD AUTO: 3.3 M/UL (ref 4.2–5.4)
SODIUM SERPL-SCNC: 138 MEQ/L (ref 135–144)
SPHEROCYTES BLD QL SMEAR: ABNORMAL
VARIANT LYMPHS NFR BLD: 1 %
VIT B6 SERPL-MCNC: 10.2 NMOL/L (ref 20–125)
WBC # BLD AUTO: 7.1 K/UL (ref 4.8–10.8)

## 2023-09-01 RX ORDER — HYDROCODONE BITARTRATE AND ACETAMINOPHEN 5; 325 MG/1; MG/1
1 TABLET ORAL EVERY 6 HOURS PRN
Qty: 56 TABLET | Refills: 0 | Status: SHIPPED | OUTPATIENT
Start: 2023-09-01 | End: 2023-09-15

## 2023-09-01 NOTE — PROGRESS NOTES
Name: [address] [place] [address] Avenue: [facility]  [address]  [place], [address]  Date: 9/1/2023     Subjective:     Chief Complaint   Patient presents with    Follow-up       HPI  Manuel Cloud is a 59 y.o. female being seen today for evaluation of acute rehab progress, difficulty walking and weakness, UTI, generalized pain, hypertension, and atrial fibrillation. Resident was admitted to [facility] for acute rehab secondary to debility deconditioning. She had global weakness of her upper extremity for the past 2 years has been worsening over time. She had a hip fracture pelvic fracture within the last 2 years, with new onset pain in her joints. She moved here from [place] and asking looking for a primary provider to manage her conditions. She has been evaluated by physical and Occupational Therapy team, she did stand for OT, PT feels she needs to be a [place] lift due to extreme weakness in the lower extremities. She is being treated currently for UTI, on antibiotics for 5 more days. Denies urinary symptoms of dysuria urgency, states frequency is due to diuretics. She is coagulation for paroxysmal atrial fibrillation, lisinopril for blood pressure control Lasix for edema management, statin for hyperlipidemia. Pain level is 8 out of 10 on a scale of 10 at its worst, pain in feet and upper extremities. She describes the pain as stabbing and constant. Labs were within normal range or within acceptable range, weekly labs as ordered. Blood pressure controlled on current medications.     Past Medical History:   Diagnosis Date    Atrial fibrillation ([address])     Hyperlipidemia     Hypertension     Thyroid disease          Allergies:  Reviewed in nursing facility records  Medications:  Reviewed and reconciled in nursing facility records    Review of Systems  A 10 Point review of systems was performed and is negative unless previously stated      Objective:   See nursing facility record for vital

## 2023-09-02 ENCOUNTER — OFFICE VISIT (OUTPATIENT)
Dept: GERIATRIC MEDICINE | Age: 63
End: 2023-09-02

## 2023-09-02 DIAGNOSIS — R26.2 DIFFICULTY IN WALKING: ICD-10-CM

## 2023-09-02 DIAGNOSIS — E03.9 HYPOTHYROIDISM, UNSPECIFIED TYPE: ICD-10-CM

## 2023-09-02 DIAGNOSIS — R53.1 WEAKNESS: Primary | ICD-10-CM

## 2023-09-02 DIAGNOSIS — N39.0 URINARY TRACT INFECTION WITHOUT HEMATURIA, SITE UNSPECIFIED: ICD-10-CM

## 2023-09-02 DIAGNOSIS — I48.0 PAROXYSMAL ATRIAL FIBRILLATION (HCC): ICD-10-CM

## 2023-09-02 DIAGNOSIS — I10 PRIMARY HYPERTENSION: ICD-10-CM

## 2023-09-02 LAB
BACTERIA BLD CULT ORG #2: NORMAL
BACTERIA BLD CULT: NORMAL
METHYLMALONATE SERPL-SCNC: 0.15 UMOL/L (ref 0–0.4)

## 2023-09-04 ENCOUNTER — OFFICE VISIT (OUTPATIENT)
Dept: GERIATRIC MEDICINE | Age: 63
End: 2023-09-04

## 2023-09-04 DIAGNOSIS — I10 PRIMARY HYPERTENSION: ICD-10-CM

## 2023-09-04 DIAGNOSIS — R26.2 DIFFICULTY IN WALKING: ICD-10-CM

## 2023-09-04 DIAGNOSIS — N30.00 ACUTE CYSTITIS WITHOUT HEMATURIA: ICD-10-CM

## 2023-09-04 DIAGNOSIS — N39.0 URINARY TRACT INFECTION WITHOUT HEMATURIA, SITE UNSPECIFIED: ICD-10-CM

## 2023-09-04 DIAGNOSIS — I48.0 PAROXYSMAL ATRIAL FIBRILLATION (HCC): ICD-10-CM

## 2023-09-04 DIAGNOSIS — E03.9 HYPOTHYROIDISM, UNSPECIFIED TYPE: ICD-10-CM

## 2023-09-04 DIAGNOSIS — R53.1 WEAKNESS: Primary | ICD-10-CM

## 2023-09-04 LAB
CARBAMYLATED PROTEIN (CARP) ANTIBODY, IGG: 4 UNITS (ref 0–19)
RHEUMATOID FACT SER NEPH-ACNC: 121 IU/ML (ref 0–14)

## 2023-09-05 LAB
ANION GAP SERPL CALCULATED.3IONS-SCNC: 11 MEQ/L (ref 9–15)
BASOPHILS # BLD: 0 K/UL (ref 0–0.2)
BASOPHILS NFR BLD: 0.5 %
BUN SERPL-MCNC: 23 MG/DL (ref 8–23)
CALCIUM SERPL-MCNC: 10 MG/DL (ref 8.5–9.9)
CHLORIDE SERPL-SCNC: 100 MEQ/L (ref 95–107)
CO2 SERPL-SCNC: 27 MEQ/L (ref 20–31)
CREAT SERPL-MCNC: 1.02 MG/DL (ref 0.5–0.9)
EOSINOPHIL # BLD: 0.3 K/UL (ref 0–0.7)
EOSINOPHIL NFR BLD: 4.9 %
ERYTHROCYTE [DISTWIDTH] IN BLOOD BY AUTOMATED COUNT: 15.9 % (ref 11.5–14.5)
GLUCOSE SERPL-MCNC: 91 MG/DL (ref 70–99)
HCT VFR BLD AUTO: 29 % (ref 37–47)
HGB BLD-MCNC: 9.4 G/DL (ref 12–16)
LYMPHOCYTES # BLD: 1.8 K/UL (ref 1–4.8)
LYMPHOCYTES NFR BLD: 30.5 %
MCH RBC QN AUTO: 27 PG (ref 27–31.3)
MCHC RBC AUTO-ENTMCNC: 32.2 % (ref 33–37)
MCV RBC AUTO: 83.7 FL (ref 79.4–94.8)
MONOCYTES # BLD: 0.4 K/UL (ref 0.2–0.8)
MONOCYTES NFR BLD: 6.8 %
NEUTROPHILS # BLD: 3.4 K/UL (ref 1.4–6.5)
NEUTS SEG NFR BLD: 57.3 %
PLATELET # BLD AUTO: 429 K/UL (ref 130–400)
POTASSIUM SERPL-SCNC: 4.4 MEQ/L (ref 3.4–4.9)
RBC # BLD AUTO: 3.47 M/UL (ref 4.2–5.4)
SODIUM SERPL-SCNC: 138 MEQ/L (ref 135–144)
WBC # BLD AUTO: 5.9 K/UL (ref 4.8–10.8)

## 2023-09-06 ENCOUNTER — OFFICE VISIT (OUTPATIENT)
Dept: GERIATRIC MEDICINE | Age: 63
End: 2023-09-06

## 2023-09-06 DIAGNOSIS — R53.1 WEAKNESS: Primary | ICD-10-CM

## 2023-09-06 DIAGNOSIS — N39.0 URINARY TRACT INFECTION WITHOUT HEMATURIA, SITE UNSPECIFIED: ICD-10-CM

## 2023-09-06 DIAGNOSIS — I48.0 PAROXYSMAL ATRIAL FIBRILLATION (HCC): ICD-10-CM

## 2023-09-06 DIAGNOSIS — E03.9 HYPOTHYROIDISM, UNSPECIFIED TYPE: ICD-10-CM

## 2023-09-06 DIAGNOSIS — R26.2 DIFFICULTY IN WALKING: ICD-10-CM

## 2023-09-06 DIAGNOSIS — I10 PRIMARY HYPERTENSION: ICD-10-CM

## 2023-09-07 ENCOUNTER — OFFICE VISIT (OUTPATIENT)
Dept: GERIATRIC MEDICINE | Age: 63
End: 2023-09-07

## 2023-09-07 DIAGNOSIS — I10 PRIMARY HYPERTENSION: ICD-10-CM

## 2023-09-07 DIAGNOSIS — R53.1 WEAKNESS: Primary | ICD-10-CM

## 2023-09-07 DIAGNOSIS — I48.0 PAROXYSMAL ATRIAL FIBRILLATION (HCC): ICD-10-CM

## 2023-09-07 DIAGNOSIS — R26.2 DIFFICULTY IN WALKING: ICD-10-CM

## 2023-09-07 DIAGNOSIS — N39.0 URINARY TRACT INFECTION WITHOUT HEMATURIA, SITE UNSPECIFIED: ICD-10-CM

## 2023-09-07 DIAGNOSIS — E03.9 HYPOTHYROIDISM, UNSPECIFIED TYPE: ICD-10-CM

## 2023-09-07 NOTE — PROGRESS NOTES
SNF PROGRESS NOTE      Cc- weakness, UTI       Patient is a Nguyễn Aguirre 59 y.o. female who is being seen at [facility] for weakness and UTI. Patient is sitting in her bed. She denies any complaints and states that she is doing well.          Past Medical History:   Diagnosis Date    Atrial fibrillation ([address])     Hyperlipidemia     Hypertension     Thyroid disease      Erythromycin    VS reviewed    Gen- Alert and oriented x 3  Heart- RRR no murmur no LE edema   Lungs- CTA b/l no resp distress RA oxygen   Abd- bs x 4           Assessment and Plan    Gen weakness  Percocet  PT OT   UTI   Ceftin   HTN  Metoprolol   CHF   Lasix  Metoprolol   Afib   Eliquis  Metoprolol   Hypothyroid  Synthroid       Veverly Notch , [address]     Electronically signed by: Yadiel Souza DO on 9/2/2023

## 2023-09-08 ENCOUNTER — OFFICE VISIT (OUTPATIENT)
Dept: GERIATRIC MEDICINE | Age: 63
End: 2023-09-08

## 2023-09-08 DIAGNOSIS — R52 PAIN: ICD-10-CM

## 2023-09-08 DIAGNOSIS — I48.0 PAROXYSMAL ATRIAL FIBRILLATION (HCC): ICD-10-CM

## 2023-09-08 DIAGNOSIS — R26.2 DIFFICULTY IN WALKING: ICD-10-CM

## 2023-09-08 DIAGNOSIS — R53.1 WEAKNESS: Primary | ICD-10-CM

## 2023-09-08 DIAGNOSIS — I10 PRIMARY HYPERTENSION: ICD-10-CM

## 2023-09-09 LAB — URATE SERPL-MCNC: 10.6 MG/DL (ref 2.4–5.7)

## 2023-09-09 NOTE — PROGRESS NOTES
SNF PROGRESS NOTE      Cc- weakness, UTI       Patient is a Elke Melendez 59 y.o. female who is being seen at [facility] for weakness and UTI. She would like her lift status adjusted. She feels that she is ready. She states that therapy is going well.          Past Medical History:   Diagnosis Date    Atrial fibrillation ([address])     Hyperlipidemia     Hypertension     Thyroid disease      Erythromycin    VS reviewed    Gen- Alert and oriented x 3  Heart- RRR no murmur no LE edema   Lungs- CTA b/l no resp distress RA oxygen   Abd- bs x 4          Assessment and Plan    Gen weakness  Percocet  PT OT   UTI   Ceftin   HTN  Metoprolol   CHF   Lasix  Metoprolol   Afib   Eliquis  Metoprolol   Hypothyroid  Synthroid       Celso Zarco DO [facility]     Electronically signed by: Curly Stokes DO on 9/7/2023

## 2023-09-10 ENCOUNTER — OFFICE VISIT (OUTPATIENT)
Dept: GERIATRIC MEDICINE | Age: 63
End: 2023-09-10
Payer: COMMERCIAL

## 2023-09-10 DIAGNOSIS — N39.0 URINARY TRACT INFECTION WITHOUT HEMATURIA, SITE UNSPECIFIED: ICD-10-CM

## 2023-09-10 DIAGNOSIS — I48.0 PAROXYSMAL ATRIAL FIBRILLATION (HCC): ICD-10-CM

## 2023-09-10 DIAGNOSIS — R53.1 WEAKNESS: Primary | ICD-10-CM

## 2023-09-10 DIAGNOSIS — N30.00 ACUTE CYSTITIS WITHOUT HEMATURIA: ICD-10-CM

## 2023-09-10 DIAGNOSIS — R26.2 DIFFICULTY IN WALKING: ICD-10-CM

## 2023-09-10 DIAGNOSIS — E03.9 HYPOTHYROIDISM, UNSPECIFIED TYPE: ICD-10-CM

## 2023-09-10 DIAGNOSIS — I10 PRIMARY HYPERTENSION: ICD-10-CM

## 2023-09-10 PROCEDURE — 99308 SBSQ NF CARE LOW MDM 20: CPT | Performed by: INTERNAL MEDICINE

## 2023-09-11 ENCOUNTER — OFFICE VISIT (OUTPATIENT)
Dept: GERIATRIC MEDICINE | Age: 63
End: 2023-09-11
Payer: COMMERCIAL

## 2023-09-11 DIAGNOSIS — E03.9 HYPOTHYROIDISM, UNSPECIFIED TYPE: ICD-10-CM

## 2023-09-11 DIAGNOSIS — I48.0 PAROXYSMAL ATRIAL FIBRILLATION (HCC): ICD-10-CM

## 2023-09-11 DIAGNOSIS — R53.1 WEAKNESS: Primary | ICD-10-CM

## 2023-09-11 DIAGNOSIS — R26.2 DIFFICULTY IN WALKING: ICD-10-CM

## 2023-09-11 DIAGNOSIS — N39.0 URINARY TRACT INFECTION WITHOUT HEMATURIA, SITE UNSPECIFIED: ICD-10-CM

## 2023-09-11 DIAGNOSIS — I10 PRIMARY HYPERTENSION: ICD-10-CM

## 2023-09-11 LAB — CRP SERPL HS-MCNC: 9.6 MG/L (ref 0–5)

## 2023-09-11 PROCEDURE — 99308 SBSQ NF CARE LOW MDM 20: CPT | Performed by: INTERNAL MEDICINE

## 2023-09-11 NOTE — PROGRESS NOTES
SNF PROGRESS NOTE      Cc- weakness/ UTI       Patient is a Allegra Ann 59 y.o. female who is being seen at [facility] for weakness and UTI. Patient is sitting in the chair at bedside. She is on RA. NO SOB. She is asking for voltaren gel to be placed on her knees.          Past Medical History:   Diagnosis Date    Atrial fibrillation ([address])     Hyperlipidemia     Hypertension     Thyroid disease      Erythromycin    VS reviewed    Gen- Alert and oriented x 3  Heart- RRR no murmur no LE edema   Lungs- CTA b/l no resp distress RA oxygen   Abd- bs x 4           Assessment and Plan    Gen weakness/ Knee pain   Percocet/ voltaren gel  PT OT   UTI --resolved/treated   S/p Ceftin course  HTN  Metoprolol   CHF   Lasix  Metoprolol   Afib   Eliquis  Metoprolol   Hypothyroid  Synthroid       Mukul Jimenez DO, [address]     Electronically signed by: Reji Chowdhury DO on 9/10/2023

## 2023-09-12 ENCOUNTER — OFFICE VISIT (OUTPATIENT)
Dept: GERIATRIC MEDICINE | Age: 63
End: 2023-09-12

## 2023-09-12 DIAGNOSIS — M10.9 ACUTE GOUT OF MULTIPLE SITES, UNSPECIFIED CAUSE: ICD-10-CM

## 2023-09-12 DIAGNOSIS — R26.2 DIFFICULTY IN WALKING: Primary | ICD-10-CM

## 2023-09-12 DIAGNOSIS — R53.1 WEAKNESS: ICD-10-CM

## 2023-09-12 NOTE — PROGRESS NOTES
SNF PROGRESS NOTE      Cc- weakness/ UTI       Patient is a Caren Burroughs 59 y.o. female who is being seen at [facility] for weakness and UTI. She is sitting in the chair at bedside. She is on RA. She states that she is feeling good. Still continuing to make progress with therapy.          Past Medical History:   Diagnosis Date    Atrial fibrillation ([address])     Hyperlipidemia     Hypertension     Thyroid disease      Erythromycin    VS reviewed    Gen- Alert and oriented x 3, obese  Heart- RRR no murmur no LE edema   Lungs- CTA b/l no resp distress RA oxygen   Abd- bs x 4, obese          Assessment and Plan    Gen weakness/ Knee pain   Percocet/ voltaren gel  PT OT   UTI --resolved/treated   S/p Ceftin course  HTN  Metoprolol   CHF   Lasix  Metoprolol   Afib   Eliquis  Metoprolol   Hypothyroid  Synthroid       Axel Clemente DO, [address]     Electronically signed by: Allegra Holm DO on 9/11/2023

## 2023-09-12 NOTE — PROGRESS NOTES
SNF PROGRESS NOTE      Cc- weakness, UTI       Patient is a Tiffany Garcia 59 y.o. female who is being seen at [facility] for weakness and UTI. Patient is laying in bed. She denies any complaints of SOB.          Past Medical History:   Diagnosis Date    Atrial fibrillation ([address])     Hyperlipidemia     Hypertension     Thyroid disease      Erythromycin    VS reviewed    Gen- Alert and oriented x 3  Heart- RRR no murmur no LE edema   Lungs- CTA b/l no resp distress RA oxygen   Abd- bs x 4           Assessment and Plan    Gen weakness  Percocet  PT OT   UTI   Ceftin   HTN  Metoprolol   CHF   Lasix  Metoprolol   Afib   Eliquis  Metoprolol   Hypothyroid  Synthroid       Ant Fagan DO, [address]     Electronically signed by: Daniel Jackson DO on 9/4/2023

## 2023-09-13 ENCOUNTER — TELEPHONE (OUTPATIENT)
Dept: ORTHOPEDIC SURGERY | Age: 63
End: 2023-09-13

## 2023-09-13 DIAGNOSIS — M17.0 PRIMARY OSTEOARTHRITIS OF BOTH KNEES: Primary | ICD-10-CM

## 2023-09-13 LAB
ANION GAP SERPL CALCULATED.3IONS-SCNC: 10 MEQ/L (ref 9–15)
BUN SERPL-MCNC: 27 MG/DL (ref 8–23)
CALCIUM SERPL-MCNC: 9.7 MG/DL (ref 8.5–9.9)
CHLORIDE SERPL-SCNC: 100 MEQ/L (ref 95–107)
CO2 SERPL-SCNC: 28 MEQ/L (ref 20–31)
CREAT SERPL-MCNC: 1.06 MG/DL (ref 0.5–0.9)
ERYTHROCYTE [DISTWIDTH] IN BLOOD BY AUTOMATED COUNT: 16.8 % (ref 11.5–14.5)
GLUCOSE SERPL-MCNC: 78 MG/DL (ref 70–99)
HCT VFR BLD AUTO: 28.9 % (ref 37–47)
HGB BLD-MCNC: 9.4 G/DL (ref 12–16)
MCH RBC QN AUTO: 27.4 PG (ref 27–31.3)
MCHC RBC AUTO-ENTMCNC: 32.5 % (ref 33–37)
MCV RBC AUTO: 84.1 FL (ref 79.4–94.8)
PLATELET # BLD AUTO: 339 K/UL (ref 130–400)
POTASSIUM SERPL-SCNC: 4 MEQ/L (ref 3.4–4.9)
RBC # BLD AUTO: 3.43 M/UL (ref 4.2–5.4)
SODIUM SERPL-SCNC: 138 MEQ/L (ref 135–144)
WBC # BLD AUTO: 5.8 K/UL (ref 4.8–10.8)

## 2023-09-13 NOTE — PROGRESS NOTES
Patient Name: Delvin Hernandez : 1960        Date: 2023      Type of Appt: Consult    Reason for appt: per nursing home, consult from being in the  hospital. PT had MRIs done at [facility].     Referred by: Hospital Encounter- 23    Studies done:   23- MRI L/S @ [facility]   23- MRI T/S @ [facility]   23- MRI C/S @ [facility]     Smoking: Yes or No    REVIEW OF SYSTEMS:    Rash   Difficulty Urinating Nausea     Fever   Headaches  Bruising/Bleeding Easily     Hearing loss  Constipation  Sleep Disturbance    Shortness of breath Diarrhea  Neck Pain                Back Pain

## 2023-09-13 NOTE — TELEPHONE ENCOUNTER
Pt coming 09/22/2023 for bilateral knee pain. Juliann George you pre-order xrays and they will bring her in at 10am to [place] other side for xrays.

## 2023-09-14 ENCOUNTER — OFFICE VISIT (OUTPATIENT)
Dept: GERIATRIC MEDICINE | Age: 63
End: 2023-09-14

## 2023-09-14 DIAGNOSIS — R53.1 WEAKNESS: ICD-10-CM

## 2023-09-14 DIAGNOSIS — M10.9 ACUTE GOUT OF MULTIPLE SITES, UNSPECIFIED CAUSE: ICD-10-CM

## 2023-09-14 DIAGNOSIS — I48.0 PAROXYSMAL ATRIAL FIBRILLATION (HCC): ICD-10-CM

## 2023-09-14 DIAGNOSIS — I10 PRIMARY HYPERTENSION: ICD-10-CM

## 2023-09-14 DIAGNOSIS — N39.0 URINARY TRACT INFECTION WITHOUT HEMATURIA, SITE UNSPECIFIED: ICD-10-CM

## 2023-09-14 DIAGNOSIS — E03.9 HYPOTHYROIDISM, UNSPECIFIED TYPE: ICD-10-CM

## 2023-09-14 DIAGNOSIS — R26.2 DIFFICULTY IN WALKING: Primary | ICD-10-CM

## 2023-09-14 PROBLEM — M1A.09X0 CHRONIC GOUT OF MULTIPLE SITES: Status: ACTIVE | Noted: 2023-09-14

## 2023-09-14 LAB
ANION GAP SERPL CALCULATED.3IONS-SCNC: 13 MEQ/L (ref 9–15)
BUN SERPL-MCNC: 29 MG/DL (ref 8–23)
CALCIUM SERPL-MCNC: 9.7 MG/DL (ref 8.5–9.9)
CHLORIDE SERPL-SCNC: 103 MEQ/L (ref 95–107)
CO2 SERPL-SCNC: 25 MEQ/L (ref 20–31)
CREAT SERPL-MCNC: 1 MG/DL (ref 0.5–0.9)
ERYTHROCYTE [DISTWIDTH] IN BLOOD BY AUTOMATED COUNT: 17.3 % (ref 11.5–14.5)
GLUCOSE SERPL-MCNC: 72 MG/DL (ref 70–99)
HCT VFR BLD AUTO: 30.3 % (ref 37–47)
HGB BLD-MCNC: 9.8 G/DL (ref 12–16)
MCH RBC QN AUTO: 27.2 PG (ref 27–31.3)
MCHC RBC AUTO-ENTMCNC: 32.4 % (ref 33–37)
MCV RBC AUTO: 83.8 FL (ref 79.4–94.8)
PLATELET # BLD AUTO: 345 K/UL (ref 130–400)
POTASSIUM SERPL-SCNC: 4.4 MEQ/L (ref 3.4–4.9)
RBC # BLD AUTO: 3.62 M/UL (ref 4.2–5.4)
SODIUM SERPL-SCNC: 141 MEQ/L (ref 135–144)
WBC # BLD AUTO: 6.2 K/UL (ref 4.8–10.8)

## 2023-09-14 NOTE — PROGRESS NOTES
SNF PROGRESS NOTE      Cc- weakness, UTI       Patient is a Clearence Fawn 59 y.o. female who is being seen at [facility] for weakness and UTI. Patient is complaining of some ankle pain. She denies any other complaints and would like more weight bearing status.          Past Medical History:   Diagnosis Date    Atrial fibrillation ([address])     Hyperlipidemia     Hypertension     Thyroid disease      Erythromycin    VS reviewed    Gen- Alert and oriented x 3  Heart- RRR no murmur no LE edema   Lungs- CTA b/l no resp distress RA oxygen   Abd- bs x 4           Assessment and Plan    Gen weakness  Percocet  PT OT   UTI --treated   HTN  Metoprolol   CHF   Lasix  Metoprolol   Afib   Eliquis  Metoprolol   Hypothyroid  Synthroid       Karen Whiting DO, [address]     Electronically signed by: Bronwyn Newell DO on 9/6/2023

## 2023-09-15 NOTE — PROGRESS NOTES
1. Difficulty in walking        2. Weakness        3. Acute gout of multiple sites, unspecified cause                  Assessment and Plan:      1. Difficulty in walking  Continue PT and OT with focus on gait balance and endurance    2. Weakness  Continue PT and OT with focus on strengthening ADLs and endurance    3. Acute gout of multiple sites, unspecified cause  Continue prednisone as ordered, continue allopurinol as ordered. Reviewed labs:  Yes      I have reviewed the patient's medical history in detail and updated the computerized patient record. This note was partially generated using Dragon voice recognition system, and there may be some incorrect words, spellings, punctuation that were not noticed in checking the note before saving.       Electronically signed by: TRAE Elliott CNP on 9/12/2023

## 2023-09-18 ENCOUNTER — OFFICE VISIT (OUTPATIENT)
Dept: GERIATRIC MEDICINE | Age: 63
End: 2023-09-18

## 2023-09-18 DIAGNOSIS — I10 PRIMARY HYPERTENSION: ICD-10-CM

## 2023-09-18 DIAGNOSIS — N39.0 URINARY TRACT INFECTION WITHOUT HEMATURIA, SITE UNSPECIFIED: ICD-10-CM

## 2023-09-18 DIAGNOSIS — M10.9 ACUTE GOUT OF MULTIPLE SITES, UNSPECIFIED CAUSE: ICD-10-CM

## 2023-09-18 DIAGNOSIS — I48.0 PAROXYSMAL ATRIAL FIBRILLATION (HCC): ICD-10-CM

## 2023-09-18 DIAGNOSIS — R53.1 WEAKNESS: ICD-10-CM

## 2023-09-18 DIAGNOSIS — E03.9 HYPOTHYROIDISM, UNSPECIFIED TYPE: ICD-10-CM

## 2023-09-18 DIAGNOSIS — R26.2 DIFFICULTY IN WALKING: Primary | ICD-10-CM

## 2023-09-19 ENCOUNTER — OFFICE VISIT (OUTPATIENT)
Dept: GERIATRIC MEDICINE | Age: 63
End: 2023-09-19

## 2023-09-19 DIAGNOSIS — M10.9 ACUTE GOUT OF MULTIPLE SITES, UNSPECIFIED CAUSE: ICD-10-CM

## 2023-09-19 DIAGNOSIS — R26.2 DIFFICULTY IN WALKING: Primary | ICD-10-CM

## 2023-09-19 DIAGNOSIS — R53.1 WEAKNESS: ICD-10-CM

## 2023-09-19 DIAGNOSIS — K21.9 GASTROESOPHAGEAL REFLUX DISEASE WITHOUT ESOPHAGITIS: ICD-10-CM

## 2023-09-20 NOTE — PROGRESS NOTES
SNF PROGRESS NOTE      Cc- weakness/ UTI      Patient is a Christy Keenan 59 y.o. female who is being seen at [facility] for weakness and UTI. She is sitting in the chair in her room. She is on RA. She denies any pain. She continues to work with therapy.          Past Medical History:   Diagnosis Date    Atrial fibrillation ([address])     Hyperlipidemia     Hypertension     Thyroid disease      Erythromycin    VS reviewed      Gen- Alert and oriented x 3, obese  Heart- RRR no murmur no LE edema   Lungs- CTA b/l no resp distress RA oxygen   Abd- bs x 4, obese        Assessment and Plan    Gen weakness/ Knee pain   Percocet/ voltaren gel  PT OT   UTI --resolved/treated   S/p Ceftin course  HTN  Metoprolol   CHF   Lasix  Metoprolol   Afib   Eliquis  Metoprolol   Hypothyroid  Synthroid       Leigha Fox DO, [address]     Electronically signed by: Jose Ceballos DO on 9/14/2023

## 2023-09-22 ENCOUNTER — OFFICE VISIT (OUTPATIENT)
Dept: ORTHOPEDIC SURGERY | Age: 63
End: 2023-09-22

## 2023-09-22 DIAGNOSIS — M17.0 PRIMARY OSTEOARTHRITIS OF BOTH KNEES: Primary | ICD-10-CM

## 2023-09-22 RX ORDER — LIDOCAINE HYDROCHLORIDE 10 MG/ML
8 INJECTION, SOLUTION INFILTRATION; PERINEURAL ONCE
Status: COMPLETED | OUTPATIENT
Start: 2023-09-22 | End: 2023-09-22

## 2023-09-22 RX ORDER — TRIAMCINOLONE ACETONIDE 40 MG/ML
80 INJECTION, SUSPENSION INTRA-ARTICULAR; INTRAMUSCULAR ONCE
Status: COMPLETED | OUTPATIENT
Start: 2023-09-22 | End: 2023-09-22

## 2023-09-22 RX ADMIN — LIDOCAINE HYDROCHLORIDE 8 ML: 10 INJECTION, SOLUTION INFILTRATION; PERINEURAL at 11:14

## 2023-09-22 RX ADMIN — TRIAMCINOLONE ACETONIDE 80 MG: 40 INJECTION, SUSPENSION INTRA-ARTICULAR; INTRAMUSCULAR at 11:15

## 2023-09-22 ASSESSMENT — ENCOUNTER SYMPTOMS
GASTROINTESTINAL NEGATIVE: 1
RESPIRATORY NEGATIVE: 1
EYES NEGATIVE: 1

## 2023-09-22 NOTE — PROGRESS NOTES
SNF PROGRESS NOTE      Cc- weakness/ UTI      Patient is a Manuel Laurel 59 y.o. female who is being seen at [facility] for weakness and UTI. She is sitting in the chair at bedside with her legs elevated. She states that this morning she was coughing and felt a little fatigued, but now she is doing ok.          Past Medical History:   Diagnosis Date    Atrial fibrillation ([address])     Hyperlipidemia     Hypertension     Thyroid disease      Erythromycin    VS reviewed    Gen- Alert and oriented x 3, obese  Heart- RRR no murmur no LE edema   Lungs- CTA b/l no resp distress RA oxygen   Abd- bs x 4, obese        Assessment and Plan    Gen weakness/ Knee pain   Percocet/ voltaren gel  PT OT   UTI --resolved/treated   S/p Ceftin course  HTN  Metoprolol   CHF   Lasix  Metoprolol   Afib   Eliquis  Metoprolol   Hypothyroid  Synthroid       [facility], [address]     Electronically signed by: Sudhakar Muñoz DO on 9/18/2023

## 2023-09-22 NOTE — PROGRESS NOTES
effusion present. Popliteal cyst present no warmth, erythema, fluctuance or sign of infection. Full extension, flexion 110 degrees. The knee joint is stable, there is varus laxity when the joint is stressed. Tenderness with palpation to lateral femoral condyle. Patellofemoral crepitus with range of motion. Calf soft and nontender. Time Out  [x] Patient Verified  [x] Site Verified  [x] Laterality Verified  [x] Procedure Verified    Before aspiration/injection risks/benefits of a cortisone  injection including infection, local skin irritation, skin atrophy, continued pain/discomfort, elevated blood sugar, burning, failure to relieve pain, possible late infection were discussed with patient. Patient verbalized understanding and wanted to proceed with planned procedure. After prepping and draping the right knee in the usual sterile fashion, 2cc 40mg Kenalog and 8cc Lidocaine were injected intra-articularly after aspirating 6cc clear yellow joint fluid without any complications. Patient tolerated this well. Post-procedure discomfort can be alleviated with additional medications/ice/elevation/rest over the first 24 hours as recommended. The patient tolerated the procedure well. If fluid was aspirated that was abnormal it was sent for further studies  in sterile specimen container as ordered to the lab     Diagnosis Orders   1. Primary osteoarthritis of both knees  NC ARTHROCENTESIS ASPIR&/INJ MAJOR JT/BURSA W/O US    triamcinolone acetonide (KENALOG-40) injection 80 mg    lidocaine 1 % injection 8 mL      Explained the x-rays with patient. Explained she has severe degenerative arthritis of the knees. Injected the right knee today with cortisone. Like to see her back in 2 weeks and we will proceed with cortisone injection left knee Visco lubricant in the right.        On this date 9/22/2023 I have spent 35 minutes reviewing previous notes, test results and face to face with the patient Price (Use Numbers Only, No Special Characters Or $): 150.00 Post-Care Instructions: I reviewed with the patient in detail post-care instructions. Patient is to wear sunprotection, and avoid picking at any of the treated lesions. Pt may apply Vaseline to crusted or scabbing areas. Consent: The patient's consent was obtained including but not limited to risks of crusting, scabbing, blistering, scarring, darker or lighter pigmentary change, recurrence, incomplete removal and infection. Anesthesia Volume In Cc: 0.5 Detail Level: Detailed

## 2023-09-25 ENCOUNTER — INITIAL CONSULT (OUTPATIENT)
Dept: NEUROSURGERY | Age: 63
End: 2023-09-25
Payer: COMMERCIAL

## 2023-09-25 VITALS
DIASTOLIC BLOOD PRESSURE: 70 MMHG | WEIGHT: 293 LBS | HEIGHT: 65 IN | BODY MASS INDEX: 48.82 KG/M2 | TEMPERATURE: 97.6 F | SYSTOLIC BLOOD PRESSURE: 130 MMHG

## 2023-09-25 DIAGNOSIS — M48.061 SPINAL STENOSIS OF LUMBAR REGION WITHOUT NEUROGENIC CLAUDICATION: Primary | ICD-10-CM

## 2023-09-25 DIAGNOSIS — M48.02 CERVICAL STENOSIS OF SPINE: ICD-10-CM

## 2023-09-25 PROCEDURE — G8417 CALC BMI ABV UP PARAM F/U: HCPCS | Performed by: NEUROLOGICAL SURGERY

## 2023-09-25 PROCEDURE — G8427 DOCREV CUR MEDS BY ELIG CLIN: HCPCS | Performed by: NEUROLOGICAL SURGERY

## 2023-09-25 PROCEDURE — 3078F DIAST BP <80 MM HG: CPT | Performed by: NEUROLOGICAL SURGERY

## 2023-09-25 PROCEDURE — 3075F SYST BP GE 130 - 139MM HG: CPT | Performed by: NEUROLOGICAL SURGERY

## 2023-09-25 PROCEDURE — 99204 OFFICE O/P NEW MOD 45 MIN: CPT | Performed by: NEUROLOGICAL SURGERY

## 2023-09-25 ASSESSMENT — ENCOUNTER SYMPTOMS
COUGH: 0
EYE PAIN: 0
ABDOMINAL DISTENTION: 0
BACK PAIN: 1
ABDOMINAL PAIN: 0
SHORTNESS OF BREATH: 0
TROUBLE SWALLOWING: 0

## 2023-09-25 NOTE — PROGRESS NOTES
NEUROSURGERY CONSULT NOTE      Patient Name: Logan Root  Patient : 1960  MRN: [id number]       PCP: No primary care provider on file. History of Present Ilness: 59 y.o. presents in neurosurgical consultation from Dr Kwame Farley with evaluation of spinal stenosis. In  she had a fall and had pelvic fx. she tells me she was not walking after this but did improve to the point where she was using walker. She is from out of town and was recently hospitalized for generalized weakness, she was not ambulating at that point and was transferred to rehab and is now able to walk some with a walker. Now walking with walker. Having pain and numbness in low back for 2 years since fall. Generalized weakness, none down arms or legs, some feet numbness. No pain down arms or legs. Chief Complaint   Patient presents with    Other     Follow up from Hospital          Conservative Treatments:  Physical Therapy: Yes  NSAID's: No  Narcotics: No  Muscle relaxants: No  Epidural injections: No      Past Medical History:        Diagnosis Date    Atrial fibrillation ([address])     Hyperlipidemia     Hypertension     Thyroid disease        Past Surgical History:        Procedure Laterality Date    JOINT REPLACEMENT      THYROIDECTOMY         Home Medications:   Prior to Admission medications    Medication Sig Start Date End Date Taking?  Authorizing Provider   acetaminophen (TYLENOL) 325 MG tablet Take 2 tablets by mouth every 6 hours as needed for Pain or Fever 23  Yes Monika Umana MD   metoprolol tartrate (LOPRESSOR) 25 MG tablet Take 1 tablet by mouth 2 times daily  Patient taking differently: Take 1 tablet by mouth 2 times daily Indications: High Blood Pressure Disorder 23  Yes Monika Umana MD   folic acid (FOLVITE) 1 MG tablet Take 1 tablet by mouth daily  Patient taking differently: Take 1 tablet by mouth daily Indications: Nutritional Support 23  Yes Monika Umana MD   lisinopril (PRINIVIL;ZESTRIL)

## 2023-09-26 ENCOUNTER — TELEPHONE (OUTPATIENT)
Dept: PAIN MANAGEMENT | Age: 63
End: 2023-09-26

## 2023-09-26 ENCOUNTER — OFFICE VISIT (OUTPATIENT)
Dept: GERIATRIC MEDICINE | Age: 63
End: 2023-09-26
Payer: COMMERCIAL

## 2023-09-26 DIAGNOSIS — M10.9 ACUTE GOUT OF MULTIPLE SITES, UNSPECIFIED CAUSE: ICD-10-CM

## 2023-09-26 DIAGNOSIS — R26.2 DIFFICULTY IN WALKING: Primary | ICD-10-CM

## 2023-09-26 DIAGNOSIS — R21 RASH: ICD-10-CM

## 2023-09-26 DIAGNOSIS — R53.1 WEAKNESS: ICD-10-CM

## 2023-09-26 PROBLEM — N39.0 UTI (URINARY TRACT INFECTION): Status: RESOLVED | Noted: 2023-08-27 | Resolved: 2023-09-26

## 2023-09-26 PROCEDURE — 99308 SBSQ NF CARE LOW MDM 20: CPT | Performed by: NURSE PRACTITIONER

## 2023-09-27 ENCOUNTER — OFFICE VISIT (OUTPATIENT)
Dept: GERIATRIC MEDICINE | Age: 63
End: 2023-09-27

## 2023-09-27 DIAGNOSIS — I48.0 PAROXYSMAL ATRIAL FIBRILLATION (HCC): ICD-10-CM

## 2023-09-27 DIAGNOSIS — E03.9 HYPOTHYROIDISM, UNSPECIFIED TYPE: ICD-10-CM

## 2023-09-27 DIAGNOSIS — N39.0 URINARY TRACT INFECTION WITHOUT HEMATURIA, SITE UNSPECIFIED: ICD-10-CM

## 2023-09-27 DIAGNOSIS — I10 PRIMARY HYPERTENSION: ICD-10-CM

## 2023-09-27 DIAGNOSIS — R53.1 WEAKNESS: ICD-10-CM

## 2023-09-27 DIAGNOSIS — M10.9 ACUTE GOUT OF MULTIPLE SITES, UNSPECIFIED CAUSE: ICD-10-CM

## 2023-09-27 DIAGNOSIS — R26.2 DIFFICULTY IN WALKING: Primary | ICD-10-CM

## 2023-09-27 NOTE — PROGRESS NOTES
SNF PROGRESS NOTE      Cc- weakness/ UTI      Patient is a Christy Keenan 59 y.o. female who is being seen at [facility] for weakness and UTI. Patient is sitting in the chair. She states that she got her knee injected. She will get it injected with lubricant soon and then the other knee injected. She states that she hopes she is on the list for knee replacement for next year. She states that she is walking 300 feet and feeling good. Plan for discharge Friday. Past Medical History:   Diagnosis Date    Atrial fibrillation (HCC)     Hyperlipidemia     Hypertension     Thyroid disease      Erythromycin    VS reviewed    Gen- Alert and oriented x 3, obese  Heart- RRR no murmur no LE edema   Lungs- CTA b/l no resp distress RA oxygen   Abd- bs x 4, obese          Assessment and Plan    Gen weakness/ Knee pain   Percocet/ voltaren gel  PT OT   Got knee injections and will get lubricant injections. UTI --resolved/treated   S/p Ceftin course  HTN  Metoprolol   CHF   Lasix  Metoprolol   Afib   Eliquis  Metoprolol   Hypothyroid  Synthroid     Patient planned for discharge on Friday.      Leigha Fox DO, [facility]     Electronically signed by: Jose Ceballos DO on 9/27/2023

## 2023-09-28 ENCOUNTER — OFFICE VISIT (OUTPATIENT)
Dept: GERIATRIC MEDICINE | Age: 63
End: 2023-09-28

## 2023-09-28 DIAGNOSIS — N39.0 URINARY TRACT INFECTION WITHOUT HEMATURIA, SITE UNSPECIFIED: ICD-10-CM

## 2023-09-28 DIAGNOSIS — E03.9 HYPOTHYROIDISM, UNSPECIFIED TYPE: ICD-10-CM

## 2023-09-28 DIAGNOSIS — R26.2 DIFFICULTY IN WALKING: Primary | ICD-10-CM

## 2023-09-28 DIAGNOSIS — I48.0 PAROXYSMAL ATRIAL FIBRILLATION (HCC): ICD-10-CM

## 2023-09-28 DIAGNOSIS — M10.9 ACUTE GOUT OF MULTIPLE SITES, UNSPECIFIED CAUSE: ICD-10-CM

## 2023-09-28 DIAGNOSIS — I10 PRIMARY HYPERTENSION: ICD-10-CM

## 2023-09-28 DIAGNOSIS — R53.1 WEAKNESS: ICD-10-CM

## 2023-09-29 ENCOUNTER — OFFICE VISIT (OUTPATIENT)
Dept: GERIATRIC MEDICINE | Age: 63
End: 2023-09-29

## 2023-09-29 DIAGNOSIS — I48.0 PAROXYSMAL ATRIAL FIBRILLATION (HCC): ICD-10-CM

## 2023-09-29 DIAGNOSIS — R53.1 WEAKNESS: ICD-10-CM

## 2023-09-29 DIAGNOSIS — N39.0 URINARY TRACT INFECTION WITHOUT HEMATURIA, SITE UNSPECIFIED: ICD-10-CM

## 2023-09-29 DIAGNOSIS — E03.9 HYPOTHYROIDISM, UNSPECIFIED TYPE: ICD-10-CM

## 2023-09-29 DIAGNOSIS — R26.2 DIFFICULTY IN WALKING: Primary | ICD-10-CM

## 2023-09-29 DIAGNOSIS — I10 PRIMARY HYPERTENSION: ICD-10-CM

## 2023-09-29 DIAGNOSIS — M10.9 ACUTE GOUT OF MULTIPLE SITES, UNSPECIFIED CAUSE: ICD-10-CM

## 2023-09-29 NOTE — PROGRESS NOTES
incorrect words, spellings, punctuation that were not noticed in checking the note before saving.       Electronically signed by: TRAE Lomeli CNP on 9/8/2023

## 2023-09-29 NOTE — PROGRESS NOTES
SNF PROGRESS NOTE      Cc- weakness/ UTI      Patient is a Genaro Parkinson 59 y.o. female who is being seen at [facility] for weakness and UTI. She is walking well with therapy. She got 1 knee injected. She states that her itching is improved. Allopurinol stopped. Past Medical History:   Diagnosis Date    Atrial fibrillation (HCC)     Hyperlipidemia     Hypertension     Thyroid disease      Erythromycin    VS reviewed    Gen- Alert and oriented x 3, obese  Heart- RRR no murmur no LE edema   Lungs- CTA b/l no resp distress RA oxygen   Abd- bs x 4, obese          Assessment and Plan    Gen weakness/ Knee pain   Percocet/ voltaren gel  PT OT   Got knee injections and will get lubricant injections.    UTI --resolved/treated   S/p Ceftin course  HTN  Metoprolol   CHF   Lasix  Metoprolol   Afib   Eliquis  Metoprolol   Hypothyroid  Synthroid      Patient planned for discharge on Friday      Vignesh Valdez DO [facility]     Electronically signed by: Aileen Ghosh DO on 9/28/2023

## 2023-09-30 NOTE — PROGRESS NOTES
Discharge Summary       Discharge Disposition home with home care    Discharge Condition stable       Discharge time  42 min     Medications   Current Outpatient Medications   Medication Sig Dispense Refill    acetaminophen (TYLENOL) 325 MG tablet Take 2 tablets by mouth every 6 hours as needed for Pain or Fever  0    metoprolol tartrate (LOPRESSOR) 25 MG tablet Take 1 tablet by mouth 2 times daily (Patient taking differently: Take 1 tablet by mouth 2 times daily Indications: High Blood Pressure Disorder) 60 tablet 3    polyethylene glycol (GLYCOLAX) 17 g packet Take 1 packet by mouth daily as needed for Constipation (Patient not taking: Reported on 1/40/2071)  0    folic acid (FOLVITE) 1 MG tablet Take 1 tablet by mouth daily (Patient taking differently: Take 1 tablet by mouth daily Indications: Nutritional Support)  0    ferrous sulfate (IRON 325) 325 (65 Fe) MG tablet Take 1 tablet by mouth in the morning and at bedtime (Patient not taking: Reported on 9/25/2023) 30 tablet 3    lisinopril (PRINIVIL;ZESTRIL) 2.5 MG tablet Take 1 tablet by mouth daily (Patient taking differently: Take 1 tablet by mouth daily Indications: High Blood Pressure Disorder)      apixaban (ELIQUIS) 5 MG TABS tablet Take 1 tablet by mouth 2 times daily Indications: Preventative Treatment      flecainide (TAMBOCOR) 50 MG tablet Take 1 tablet by mouth 2 times daily Indications: Heart Rhythm Disorder      furosemide (LASIX) 20 MG tablet Take 1 tablet by mouth daily Indications: Treatment with Diuretic Therapy      spironolactone (ALDACTONE) 25 MG tablet Take 1 tablet by mouth daily Indications: High Blood Pressure Disorder      atorvastatin (LIPITOR) 40 MG tablet Take 1 tablet by mouth nightly Indications: High Amount of Fats in the Blood      omeprazole (PRILOSEC) 20 MG delayed release capsule Take 1 capsule by mouth daily (Patient not taking: Reported on 9/25/2023)

## 2023-10-01 PROBLEM — R52 PAIN: Status: RESOLVED | Noted: 2023-09-01 | Resolved: 2023-10-01

## 2023-10-06 ENCOUNTER — OFFICE VISIT (OUTPATIENT)
Dept: ORTHOPEDIC SURGERY | Age: 63
End: 2023-10-06

## 2023-10-06 VITALS
HEART RATE: 54 BPM | BODY MASS INDEX: 48.82 KG/M2 | HEIGHT: 65 IN | OXYGEN SATURATION: 96 % | TEMPERATURE: 97.4 F | WEIGHT: 293 LBS

## 2023-10-06 DIAGNOSIS — M17.0 PRIMARY OSTEOARTHRITIS OF BOTH KNEES: Primary | ICD-10-CM

## 2023-10-06 RX ORDER — LIDOCAINE HYDROCHLORIDE 10 MG/ML
8 INJECTION, SOLUTION INFILTRATION; PERINEURAL ONCE
Status: COMPLETED | OUTPATIENT
Start: 2023-10-06 | End: 2023-10-06

## 2023-10-06 RX ORDER — TRIAMCINOLONE ACETONIDE 40 MG/ML
80 INJECTION, SUSPENSION INTRA-ARTICULAR; INTRAMUSCULAR ONCE
Status: COMPLETED | OUTPATIENT
Start: 2023-10-06 | End: 2023-10-06

## 2023-10-06 RX ADMIN — LIDOCAINE HYDROCHLORIDE 8 ML: 10 INJECTION, SOLUTION INFILTRATION; PERINEURAL at 12:14

## 2023-10-06 RX ADMIN — TRIAMCINOLONE ACETONIDE 80 MG: 40 INJECTION, SUSPENSION INTRA-ARTICULAR; INTRAMUSCULAR at 12:15

## 2023-10-06 NOTE — PROGRESS NOTES
Time Out  [x] Patient Verified  [x] Site Verified  [x] Laterality Verified  [x] Procedure Verified    Before aspiration/injection risks/benefits of a Viscolubricant injection including infection, local skin irritation, skin atrophy, continued pain/discomfort, elevated blood sugar, burning, failure to relieve pain, possible late infection were discussed with patient. Patient verbalized understanding and wanted to proceed with planned procedure. After prepping and draping the right knee in the usual sterile fashion,  3 cc Durolane  were injected intra-articularly after aspirating 4 cc clear yellow joint fluid without any complications. Patient tolerated this well. Post-procedure discomfort can be alleviated with additional medications/ice/elevation/rest over the first 24 hours as recommended. The patient tolerated the procedure well. If fluid was aspirated that was abnormal it was sent for further studies  in sterile specimen container as ordered to the lab     Diagnosis Orders   1. Primary osteoarthritis of both knees  HI ARTHROCENTESIS ASPIR&/INJ MAJOR JT/BURSA W/O US    HI ARTHROCENTESIS ASPIR&/INJ MAJOR JT/BURSA W/O US    sodium hyaluronate (DUROLANE) injection PRSY 60 mg    lidocaine 1 % injection 8 mL    triamcinolone acetonide (KENALOG-40) injection 80 mg            Time Out  [x] Patient Verified  [x] Site Verified  [x] Laterality Verified  [x] Procedure Verified    Before aspiration/injection risks/benefits of a cortisone  injection including infection, local skin irritation, skin atrophy, continued pain/discomfort, elevated blood sugar, burning, failure to relieve pain, possible late infection were discussed with patient. Patient verbalized understanding and wanted to proceed with planned procedure.     After prepping and draping the left knee in the usual sterile fashion, 2cc 40mg Kenalog and 8cc Lidocaine were injected intra-articularly after aspirating 6 clear yellow joint fluid without

## 2023-10-10 ENCOUNTER — OFFICE VISIT (OUTPATIENT)
Dept: FAMILY MEDICINE CLINIC | Age: 63
End: 2023-10-10
Payer: COMMERCIAL

## 2023-10-10 VITALS
BODY MASS INDEX: 48.82 KG/M2 | HEART RATE: 60 BPM | WEIGHT: 293 LBS | SYSTOLIC BLOOD PRESSURE: 134 MMHG | TEMPERATURE: 97.7 F | OXYGEN SATURATION: 96 % | HEIGHT: 65 IN | DIASTOLIC BLOOD PRESSURE: 74 MMHG

## 2023-10-10 DIAGNOSIS — Z09 HOSPITAL DISCHARGE FOLLOW-UP: ICD-10-CM

## 2023-10-10 DIAGNOSIS — I89.0 LYMPHEDEMA: ICD-10-CM

## 2023-10-10 DIAGNOSIS — Z12.31 ENCOUNTER FOR SCREENING MAMMOGRAM FOR MALIGNANT NEOPLASM OF BREAST: ICD-10-CM

## 2023-10-10 DIAGNOSIS — M10.9 ACUTE GOUT OF MULTIPLE SITES, UNSPECIFIED CAUSE: Primary | ICD-10-CM

## 2023-10-10 DIAGNOSIS — N39.0 CHRONIC LOWER URINARY TRACT INFECTION: ICD-10-CM

## 2023-10-10 DIAGNOSIS — Z12.4 SCREENING FOR CERVICAL CANCER: ICD-10-CM

## 2023-10-10 DIAGNOSIS — Z78.9 DECREASED ACTIVITIES OF DAILY LIVING (ADL): ICD-10-CM

## 2023-10-10 DIAGNOSIS — Z91.81 AT MODERATE RISK FOR FALL: ICD-10-CM

## 2023-10-10 DIAGNOSIS — Z13.220 SCREENING FOR LIPID DISORDERS: ICD-10-CM

## 2023-10-10 DIAGNOSIS — K21.9 GASTROESOPHAGEAL REFLUX DISEASE WITHOUT ESOPHAGITIS: ICD-10-CM

## 2023-10-10 PROCEDURE — 1111F DSCHRG MED/CURRENT MED MERGE: CPT | Performed by: NURSE PRACTITIONER

## 2023-10-10 PROCEDURE — G8417 CALC BMI ABV UP PARAM F/U: HCPCS | Performed by: NURSE PRACTITIONER

## 2023-10-10 PROCEDURE — 99214 OFFICE O/P EST MOD 30 MIN: CPT | Performed by: NURSE PRACTITIONER

## 2023-10-10 PROCEDURE — G8484 FLU IMMUNIZE NO ADMIN: HCPCS | Performed by: NURSE PRACTITIONER

## 2023-10-10 PROCEDURE — 3075F SYST BP GE 130 - 139MM HG: CPT | Performed by: NURSE PRACTITIONER

## 2023-10-10 PROCEDURE — 3017F COLORECTAL CA SCREEN DOC REV: CPT | Performed by: NURSE PRACTITIONER

## 2023-10-10 PROCEDURE — 1036F TOBACCO NON-USER: CPT | Performed by: NURSE PRACTITIONER

## 2023-10-10 PROCEDURE — 3078F DIAST BP <80 MM HG: CPT | Performed by: NURSE PRACTITIONER

## 2023-10-10 PROCEDURE — G8427 DOCREV CUR MEDS BY ELIG CLIN: HCPCS | Performed by: NURSE PRACTITIONER

## 2023-10-10 RX ORDER — OMEPRAZOLE 40 MG/1
40 CAPSULE, DELAYED RELEASE ORAL DAILY
Qty: 90 CAPSULE | Refills: 2 | Status: SHIPPED | OUTPATIENT
Start: 2023-10-10

## 2023-10-10 RX ORDER — COLCHICINE 0.6 MG/1
0.6 TABLET ORAL DAILY
Qty: 90 TABLET | Refills: 2 | Status: SHIPPED | OUTPATIENT
Start: 2023-10-10

## 2023-10-10 RX ORDER — FOLIC ACID 1 MG/1
1 TABLET ORAL DAILY
Qty: 90 TABLET | Refills: 2 | Status: SHIPPED | OUTPATIENT
Start: 2023-10-10

## 2023-10-10 RX ORDER — COLCHICINE 0.6 MG/1
1 TABLET ORAL
COMMUNITY
Start: 2023-09-28 | End: 2023-10-10 | Stop reason: SDUPTHER

## 2023-10-10 RX ORDER — OLMESARTAN MEDOXOMIL 40 MG/1
40 TABLET ORAL DAILY
COMMUNITY

## 2023-10-10 RX ORDER — SPIRONOLACTONE 25 MG/1
25 TABLET ORAL DAILY
Qty: 90 TABLET | Refills: 2 | Status: SHIPPED | OUTPATIENT
Start: 2023-10-10

## 2023-10-10 SDOH — ECONOMIC STABILITY: INCOME INSECURITY: HOW HARD IS IT FOR YOU TO PAY FOR THE VERY BASICS LIKE FOOD, HOUSING, MEDICAL CARE, AND HEATING?: NOT HARD AT ALL

## 2023-10-10 SDOH — ECONOMIC STABILITY: FOOD INSECURITY: WITHIN THE PAST 12 MONTHS, THE FOOD YOU BOUGHT JUST DIDN'T LAST AND YOU DIDN'T HAVE MONEY TO GET MORE.: NEVER TRUE

## 2023-10-10 SDOH — ECONOMIC STABILITY: FOOD INSECURITY: WITHIN THE PAST 12 MONTHS, YOU WORRIED THAT YOUR FOOD WOULD RUN OUT BEFORE YOU GOT MONEY TO BUY MORE.: NEVER TRUE

## 2023-10-10 SDOH — ECONOMIC STABILITY: HOUSING INSECURITY
IN THE LAST 12 MONTHS, WAS THERE A TIME WHEN YOU DID NOT HAVE A STEADY PLACE TO SLEEP OR SLEPT IN A SHELTER (INCLUDING NOW)?: NO

## 2023-10-10 ASSESSMENT — PATIENT HEALTH QUESTIONNAIRE - PHQ9
SUM OF ALL RESPONSES TO PHQ QUESTIONS 1-9: 0
2. FEELING DOWN, DEPRESSED OR HOPELESS: 0
1. LITTLE INTEREST OR PLEASURE IN DOING THINGS: 0
SUM OF ALL RESPONSES TO PHQ QUESTIONS 1-9: 0
SUM OF ALL RESPONSES TO PHQ9 QUESTIONS 1 & 2: 0

## 2023-10-10 ASSESSMENT — ENCOUNTER SYMPTOMS
BLOOD IN STOOL: 0
RESPIRATORY NEGATIVE: 1
COUGH: 0
SHORTNESS OF BREATH: 0
WHEEZING: 0

## 2023-10-10 NOTE — PROGRESS NOTES
she will need referral to see rheumatology. *Hypothyroidism. TSH is slightly elevated but free T4 is normal.  Continue same Synthroid dose. Patient has multiple medical issues as listed above and others but not listed. Patient is a very nice and pleasant patient with unfortunately complex medical issues. Patient is feeling much better. Resolution of her THERESE. Patient is ready physically and psychologically to go to the skilled nursing facility. Some of the blood work that we requested are not back yet. These will need to be looked at in the outpatient setting when they become available. At this time, I do not have clear or strong clinical justification to extend inpatient hospitalization. Patient however would definitely need and require close and frequent monitoring as well as additional work-up, investigation and therapeutic intervention that potentially could take place in the outpatient setting by primary care doctor in collaboration with other specialists. That is to prevent relapse, decompensation, rehospitalization and other medical implications. Figueroa Cadet is a 59 y.o. female that was admitted and treated at [facility] for the aforementioned medical issues. Patient is feeling much better. Patient is recommended to be admitted to skilled nursing facility for physical and Occupational Therapy. Patient would require additional multispecialty involvement in the outpatient setting to take care of there respective portion of her care in the outpatient setting. These will all need to be collaborated by a good and caring primary care doctor therefore I recommended her to f/u with Dr. Shabbir Booker as her new PCP. \"    2 visits from OT/PT a week and visiting nurse once weekly. Patient lives alone however her sisters are there helping her currently. Has a month of therapy and 60 days of at home nursing per insurance. 9/2022- Yoni Osgood colonoscopy had hemorrhoids at that time.  Was good for

## 2023-10-12 ENCOUNTER — TELEPHONE (OUTPATIENT)
Dept: FAMILY MEDICINE CLINIC | Age: 63
End: 2023-10-12

## 2023-10-12 NOTE — TELEPHONE ENCOUNTER
Lady Walter from [facility] called to make pcp aware patient has had a 3.6 weight gain from [facility] 10/9 to today 10/12    Lady Walter [phone]

## 2023-10-17 ENCOUNTER — TELEPHONE (OUTPATIENT)
Dept: FAMILY MEDICINE CLINIC | Age: 63
End: 2023-10-17

## 2023-10-17 NOTE — TELEPHONE ENCOUNTER
Matthew Arizmendi with [facility] calling into the office stating that he visited  Edgar Herrmann and she mentioned that she has gained 10 pound of water maykel since last Monday. Please give the patient a a call.

## 2023-10-19 PROBLEM — K21.9 GASTROESOPHAGEAL REFLUX DISEASE WITHOUT ESOPHAGITIS: Status: ACTIVE | Noted: 2023-10-19

## 2023-10-19 NOTE — PROGRESS NOTES
Name: [address] [place] [address] Avenue: [facility]  [address]  [facility], [address] [facility] [address]  Date: 9/19/2023     Subjective:     Chief Complaint   Patient presents with    Follow-up       HPI  Nguyễn Aguirre is a 59 y.o. female being seen today for evaluation of acute rehab progress, difficulty walking weakness, acute gout multiple sites, and new c/o GERD. Resident is up in the recliner chair, obese, no acute distress. She alert orient x3 cooperative care. She is still on prednisone for gout, and states \"the joint pain is much better with prednisone. \"  Is also on allopurinol. Prednisone doses will be completed 9/23. She is participating in therapy,making progress, walked 125 feet twice with walker. Strength has improved, power grade 4/5 upper extremities and 3/5 lower extremities. Resident has h/o GERD, is asking for omeprazole, has reflux at night. Vital signs stable no fever. Resident has knee pain 5/10 which is an improvement. On Norco, Tylenol, and Voltaren gel. She denies complaints of chest pain palpitations irregular heartbeat lightheadedness dizziness nausea vomiting diarrhea constipation hematuria dysuria. Continue current plan of care. Past Medical History:   Diagnosis Date    Atrial fibrillation ([address])     Hyperlipidemia     Hypertension     Thyroid disease          Allergies:  Reviewed in nursing facility records  Medications:  Reviewed and reconciled in nursing facility records    Review of Systems  A 10 Point review of systems was performed and is negative unless previously stated      Objective:   See nursing facility record for vital signs.       Physical Exam  Constitutional:  up in wheelchair, obese, in no acute distess  Pulmonary/Chest:  lung sounds clear and diminished in lower lobes, no shortness of breath  Cardiovascular:  S1-S2 regular, edema of bilateral lower extremities  Abd/GI:  abdomen soft, round, active bowel sounds x 4 quadrants  MS/Extremities:  RINCON, ROM limited,

## 2023-10-23 ENCOUNTER — OFFICE VISIT (OUTPATIENT)
Dept: CARDIOLOGY CLINIC | Age: 63
End: 2023-10-23
Payer: COMMERCIAL

## 2023-10-23 VITALS
WEIGHT: 293 LBS | OXYGEN SATURATION: 98 % | DIASTOLIC BLOOD PRESSURE: 76 MMHG | BODY MASS INDEX: 48.82 KG/M2 | HEART RATE: 70 BPM | SYSTOLIC BLOOD PRESSURE: 104 MMHG | HEIGHT: 65 IN

## 2023-10-23 DIAGNOSIS — I48.0 PAROXYSMAL ATRIAL FIBRILLATION (HCC): Primary | ICD-10-CM

## 2023-10-23 PROBLEM — M51.35 OTHER INTERVERTEBRAL DISC DEGENERATION, THORACOLUMBAR REGION: Status: ACTIVE | Noted: 2023-08-31

## 2023-10-23 PROBLEM — M43.16 SPONDYLOLISTHESIS, LUMBAR REGION: Status: ACTIVE | Noted: 2023-08-31

## 2023-10-23 PROBLEM — M50.30 OTHER CERVICAL DISC DEGENERATION, UNSPECIFIED CERVICAL REGION: Status: ACTIVE | Noted: 2023-08-31

## 2023-10-23 PROBLEM — I50.9 HEART FAILURE, UNSPECIFIED (HCC): Status: ACTIVE | Noted: 2023-09-02

## 2023-10-23 PROBLEM — D64.9 ANEMIA, UNSPECIFIED: Status: ACTIVE | Noted: 2023-08-31

## 2023-10-23 PROBLEM — M25.461 EFFUSION, RIGHT KNEE: Status: ACTIVE | Noted: 2023-08-31

## 2023-10-23 PROBLEM — M10.9 GOUT, UNSPECIFIED: Status: ACTIVE | Noted: 2023-09-15

## 2023-10-23 PROBLEM — E03.9 HYPOTHYROIDISM, UNSPECIFIED: Status: ACTIVE | Noted: 2023-08-31

## 2023-10-23 PROBLEM — E78.5 HYPERLIPIDEMIA, UNSPECIFIED: Status: ACTIVE | Noted: 2023-08-31

## 2023-10-23 PROCEDURE — G8484 FLU IMMUNIZE NO ADMIN: HCPCS | Performed by: INTERNAL MEDICINE

## 2023-10-23 PROCEDURE — 1036F TOBACCO NON-USER: CPT | Performed by: INTERNAL MEDICINE

## 2023-10-23 PROCEDURE — 3017F COLORECTAL CA SCREEN DOC REV: CPT | Performed by: INTERNAL MEDICINE

## 2023-10-23 PROCEDURE — G8417 CALC BMI ABV UP PARAM F/U: HCPCS | Performed by: INTERNAL MEDICINE

## 2023-10-23 PROCEDURE — 93000 ELECTROCARDIOGRAM COMPLETE: CPT | Performed by: INTERNAL MEDICINE

## 2023-10-23 PROCEDURE — 3074F SYST BP LT 130 MM HG: CPT | Performed by: INTERNAL MEDICINE

## 2023-10-23 PROCEDURE — G8427 DOCREV CUR MEDS BY ELIG CLIN: HCPCS | Performed by: INTERNAL MEDICINE

## 2023-10-23 PROCEDURE — 99204 OFFICE O/P NEW MOD 45 MIN: CPT | Performed by: INTERNAL MEDICINE

## 2023-10-23 PROCEDURE — 3078F DIAST BP <80 MM HG: CPT | Performed by: INTERNAL MEDICINE

## 2023-10-23 NOTE — PROGRESS NOTES
10/23/2023    TRAE Rios CNP  [address] [facility] [address]    RE: Figueroa Cadet   : 1960     Dear TRAE Rios CNP :    I had the pleasure of seeing your patient, Figueroa Cadet in the office today on 10/23/2023. As you are well aware, Ms. Anatoliy Mcnally is a pleasant 59 y.o.  female who was kindly referred to me for evaluation of dyspnea atrial fibrillation. She was diagnosed with atrial fibrillation 4 years ago and was previously following with a cardiologist in [place], last seen May 2023. She had a cardiac catheterization 2021 and showed me results revealing normal coronary arteries with EF 55%. She was recently hospitalized at [facility] in August.  Currently, she reports intermittent palpitations from atrial fibrillation described as \"pounding\" heartbeats that occur 2-3 times a week. Most episodes last a few minutes before gradually resolving. She denies associated near-syncope or syncope. She has occasional right-sided chest discomfort which radiates to the back. No midsternal left-sided chest discomfort with activity. She reports compliance with medications including Eliquis metoprolol, and flecainide. Past Medical History: She  has a past medical history of Atrial fibrillation ([address]), Hyperlipidemia, Hypertension, and Thyroid disease. Surgical History: She  has a past surgical history that includes Thyroidectomy and joint replacement. Social History: Silvia Chen never smoked. She has never been exposed to tobacco smoke. She has never used smokeless tobacco. She reports that she does not currently use alcohol. She reports that she does not use drugs. Family History: Noncontributory for premature CAD.     Current medications:   Current Outpatient Medications   Medication Sig Dispense Refill    olmesartan (BENICAR) 40 MG tablet Take 1 tablet by mouth daily      spironolactone (ALDACTONE) 25 MG tablet Take 1 tablet by mouth daily

## 2023-10-25 PROBLEM — R21 RASH: Status: ACTIVE | Noted: 2023-10-25

## 2023-10-26 ENCOUNTER — OFFICE VISIT (OUTPATIENT)
Dept: OBGYN CLINIC | Age: 63
End: 2023-10-26
Payer: COMMERCIAL

## 2023-10-26 VITALS
HEART RATE: 82 BPM | WEIGHT: 293 LBS | DIASTOLIC BLOOD PRESSURE: 76 MMHG | SYSTOLIC BLOOD PRESSURE: 124 MMHG | BODY MASS INDEX: 52.25 KG/M2

## 2023-10-26 DIAGNOSIS — Z11.51 SCREENING FOR HUMAN PAPILLOMAVIRUS: ICD-10-CM

## 2023-10-26 DIAGNOSIS — Z12.31 ENCOUNTER FOR SCREENING MAMMOGRAM FOR BREAST CANCER: ICD-10-CM

## 2023-10-26 DIAGNOSIS — Z01.419 WOMEN'S ANNUAL ROUTINE GYNECOLOGICAL EXAMINATION: ICD-10-CM

## 2023-10-26 DIAGNOSIS — N39.0 FREQUENT UTI: ICD-10-CM

## 2023-10-26 DIAGNOSIS — N39.0 FREQUENT UTI: Primary | ICD-10-CM

## 2023-10-26 DIAGNOSIS — N90.5 VULVAR ATROPHY: ICD-10-CM

## 2023-10-26 LAB
BACTERIA URNS QL MICRO: ABNORMAL /HPF
BILIRUB UR QL STRIP: NEGATIVE
CLARITY UR: ABNORMAL
COLOR UR: ABNORMAL
EPI CELLS #/AREA URNS AUTO: ABNORMAL /HPF (ref 0–5)
GLUCOSE UR STRIP-MCNC: NEGATIVE MG/DL
HGB UR QL STRIP: ABNORMAL
HPV16+18+H RISK 12 DNA SPEC-IMP: NORMAL
HYALINE CASTS #/AREA URNS AUTO: ABNORMAL /HPF (ref 0–5)
KETONES UR STRIP-MCNC: ABNORMAL MG/DL
LEUKOCYTE ESTERASE UR QL STRIP: ABNORMAL
NITRITE UR QL STRIP: NEGATIVE
PH UR STRIP: 5 [PH] (ref 5–9)
PROT UR STRIP-MCNC: 100 MG/DL
RBC #/AREA URNS AUTO: ABNORMAL /HPF (ref 0–5)
SP GR UR STRIP: 1.02 (ref 1–1.03)
UROBILINOGEN UR STRIP-ACNC: 1 E.U./DL
WBC #/AREA URNS AUTO: >100 /HPF (ref 0–5)

## 2023-10-26 PROCEDURE — 99386 PREV VISIT NEW AGE 40-64: CPT | Performed by: ADVANCED PRACTICE MIDWIFE

## 2023-10-26 PROCEDURE — 3078F DIAST BP <80 MM HG: CPT | Performed by: ADVANCED PRACTICE MIDWIFE

## 2023-10-26 PROCEDURE — G8417 CALC BMI ABV UP PARAM F/U: HCPCS | Performed by: ADVANCED PRACTICE MIDWIFE

## 2023-10-26 PROCEDURE — 1036F TOBACCO NON-USER: CPT | Performed by: ADVANCED PRACTICE MIDWIFE

## 2023-10-26 PROCEDURE — 99203 OFFICE O/P NEW LOW 30 MIN: CPT | Performed by: ADVANCED PRACTICE MIDWIFE

## 2023-10-26 PROCEDURE — G8484 FLU IMMUNIZE NO ADMIN: HCPCS | Performed by: ADVANCED PRACTICE MIDWIFE

## 2023-10-26 PROCEDURE — 3074F SYST BP LT 130 MM HG: CPT | Performed by: ADVANCED PRACTICE MIDWIFE

## 2023-10-26 PROCEDURE — G8427 DOCREV CUR MEDS BY ELIG CLIN: HCPCS | Performed by: ADVANCED PRACTICE MIDWIFE

## 2023-10-26 PROCEDURE — 3017F COLORECTAL CA SCREEN DOC REV: CPT | Performed by: ADVANCED PRACTICE MIDWIFE

## 2023-10-26 RX ORDER — ESTRADIOL 0.1 MG/G
CREAM VAGINAL
Qty: 42.5 G | Refills: 1 | Status: SHIPPED | OUTPATIENT
Start: 2023-10-26 | End: 2024-10-25

## 2023-10-26 ASSESSMENT — ENCOUNTER SYMPTOMS
VOMITING: 0
SORE THROAT: 0
VOICE CHANGE: 0
ABDOMINAL PAIN: 0
DIARRHEA: 0
CONSTIPATION: 0
COUGH: 0
TROUBLE SWALLOWING: 0
SHORTNESS OF BREATH: 0
RHINORRHEA: 0
NAUSEA: 0

## 2023-10-26 NOTE — PROGRESS NOTES
Name: [address] [place] [address] Avenue: Psychiatric  [address]  [place] [facility], [address] [facility] [facility] [address]  Date: 9/26/2023     Subjective:     Chief Complaint   Patient presents with    Follow-up       HPI  Quin Lezama is a 59 y.o. female being seen today for evaluation of acute rehab progress, difficulty walking weakness, rash, and acute gout multiple sites. Resident is up in the recliner chair, obese, no acute distress. She alert orient x3 cooperative care. She is still off prednisone for gout and on allopurinol, and states \"the joint pain is much better but she developed a rash and itching of arms over the weekend. \"   She is participating in therapy,making progress, walked 400 feet with walker. Strength has improved, power grade 4/5 upper extremities and 4/5 lower extremities. Vital signs stable no fever. Resident has knee pain 4/10 which is an improvement, received a steroid shot in right knee, which is helping. On Norco, Tylenol, and Voltaren gel. She denies complaints of chest pain palpitations irregular heartbeat lightheadedness dizziness nausea vomiting diarrhea constipation hematuria dysuria. Continue current plan of care. Discharge home this week. Past Medical History:   Diagnosis Date    Atrial fibrillation ([address])     Hyperlipidemia     Hypertension     Thyroid disease          Allergies:  Reviewed in nursing facility records  Medications:  Reviewed and reconciled in nursing facility records    Review of Systems  A 10 Point review of systems was performed and is negative unless previously stated      Objective:   See nursing facility record for vital signs.       Physical Exam  Constitutional:  up in wheelchair, obese, in no acute distess  Pulmonary/Chest:  lung sounds clear and diminished in lower lobes, no shortness of breath  Cardiovascular:  S1-S2 regular, edema of bilateral lower extremities  Abd/GI:  abdomen soft, round, active bowel sounds x 4 quadrants  MS/Extremities:  RINCON, ROM limited,

## 2023-10-26 NOTE — PROGRESS NOTES
Chief Complaint:     Nguyễn Aguirre is a 59 y.o. female who presents here today for complaints of:      Chief Complaint   Patient presents with    Annual Exam     Last pap 6 yrs ago, pt stated      History of Present Illness: Nguyễn Aguirre is a 59 y.o. female who presents for her annual exam.    Concerns Today:    Frequent UTI    Postmenopausal  Menopausal Symptoms:   Frequent UTI  Post-Menopausal Bleeding:  No  Sexually Active:  Yes  Dyspareunia:  No  Vaginitis Symptoms:  No  Frequent UTI's (3 or more within 12 months):  Yes    Past Medical History: Allergies:  Erythromycin and Allopurinol  No LMP recorded. Patient is postmenopausal.  Obstetrical History:  No obstetric history on file.      Past Medical History:   Diagnosis Date    Atrial fibrillation ([address])     Hyperlipidemia     Hypertension     Thyroid disease      Medications:     Current Outpatient Medications on File Prior to Visit   Medication Sig Dispense Refill    olmesartan (BENICAR) 40 MG tablet Take 1 tablet by mouth daily      spironolactone (ALDACTONE) 25 MG tablet Take 1 tablet by mouth daily Indications: High Blood Pressure Disorder 90 tablet 2    colchicine (COLCRYS) 0.6 MG tablet Take 1 tablet by mouth daily 90 tablet 2    omeprazole (PRILOSEC) 40 MG delayed release capsule Take 1 capsule by mouth daily 90 capsule 2    folic acid (FOLVITE) 1 MG tablet Take 1 tablet by mouth daily 90 tablet 2    acetaminophen (TYLENOL) 325 MG tablet Take 2 tablets by mouth every 6 hours as needed for Pain or Fever  0    metoprolol tartrate (LOPRESSOR) 25 MG tablet Take 1 tablet by mouth 2 times daily (Patient taking differently: Take 2 tablets by mouth 2 times daily Indications: High Blood Pressure Disorder) 60 tablet 3    apixaban (ELIQUIS) 5 MG TABS tablet Take 1 tablet by mouth 2 times daily Indications: Preventative Treatment      flecainide (TAMBOCOR) 50 MG tablet Take 1 tablet by mouth 2 times daily Indications: Heart Rhythm Disorder

## 2023-10-28 LAB
BACTERIA UR CULT: ABNORMAL
BACTERIA UR CULT: ABNORMAL
ORGANISM: ABNORMAL

## 2023-10-28 RX ORDER — NITROFURANTOIN 25; 75 MG/1; MG/1
100 CAPSULE ORAL 2 TIMES DAILY
Qty: 14 CAPSULE | Refills: 0 | Status: SHIPPED | OUTPATIENT
Start: 2023-10-28 | End: 2023-11-04

## 2023-10-28 NOTE — RESULT ENCOUNTER NOTE
Urine Culture:  Positive  Rx:  Macrobid BID x7 days  Pharmacy:    [id number] [id number] - [place]  [address] [place] [address]  Phone: [phone] Fax: [phone]    MyChart:  Message Sent    Please make sure patient is aware. Thanks!

## 2023-11-02 LAB
HPV HR 12 DNA SPEC QL NAA+PROBE: NOT DETECTED
HPV16 DNA SPEC QL NAA+PROBE: NOT DETECTED
HPV16+18+H RISK 12 DNA SPEC-IMP: NORMAL
HPV18 DNA SPEC QL NAA+PROBE: NOT DETECTED

## 2023-11-03 ENCOUNTER — OFFICE VISIT (OUTPATIENT)
Dept: ORTHOPEDIC SURGERY | Age: 63
End: 2023-11-03

## 2023-11-03 VITALS
BODY MASS INDEX: 48.82 KG/M2 | OXYGEN SATURATION: 99 % | WEIGHT: 293 LBS | TEMPERATURE: 97.7 F | HEART RATE: 64 BPM | HEIGHT: 65 IN

## 2023-11-03 DIAGNOSIS — M17.0 PRIMARY OSTEOARTHRITIS OF BOTH KNEES: Primary | ICD-10-CM

## 2023-11-03 NOTE — PROGRESS NOTES
Time Out  [x] Patient Verified  [x] Site Verified  [x] Laterality Verified  [x] Procedure Verified    Before aspiration/injection risks/benefits of a Viscolubricant injection including infection, local skin irritation, skin atrophy, continued pain/discomfort, elevated blood sugar, burning, failure to relieve pain, possible late infection were discussed with patient. Patient verbalized understanding and wanted to proceed with planned procedure. After prepping and draping the left knee in the usual sterile fashion,  2 cc Durolane  were injected intra-articularly after aspirating 6 clear yellow joint fluid without any complications. Patient tolerated this well. Post-procedure discomfort can be alleviated with additional medications/ice/elevation/rest over the first 24 hours as recommended. The patient tolerated the procedure well. If fluid was aspirated that was abnormal it was sent for further studies  in sterile specimen container as ordered to the lab     Diagnosis Orders   1.  Primary osteoarthritis of both knees  DC ARTHROCENTESIS ASPIR&/INJ MAJOR JT/BURSA W/O US    sodium hyaluronate (DUROLANE) injection PRSY 60 mg

## 2023-11-22 ENCOUNTER — TELEPHONE (OUTPATIENT)
Dept: FAMILY MEDICINE CLINIC | Age: 63
End: 2023-11-22

## 2023-11-22 DIAGNOSIS — M10.9 ACUTE GOUT OF MULTIPLE SITES, UNSPECIFIED CAUSE: ICD-10-CM

## 2023-11-22 RX ORDER — COLCHICINE 0.6 MG/1
0.6 TABLET ORAL DAILY
Qty: 90 TABLET | Refills: 3 | Status: SHIPPED | OUTPATIENT
Start: 2023-11-22

## 2023-11-22 NOTE — TELEPHONE ENCOUNTER
Patient called and is stating she is having a flare up of gout and stated that she already took her 1 tablet of Colcrys and is asking what she should do? Should took the medication at 9AM this morning and she stated that it isn't helping.

## 2023-11-27 ENCOUNTER — OFFICE VISIT (OUTPATIENT)
Dept: CARDIOLOGY CLINIC | Age: 63
End: 2023-11-27
Payer: COMMERCIAL

## 2023-11-27 VITALS
HEIGHT: 65 IN | OXYGEN SATURATION: 96 % | WEIGHT: 293 LBS | SYSTOLIC BLOOD PRESSURE: 110 MMHG | DIASTOLIC BLOOD PRESSURE: 74 MMHG | HEART RATE: 74 BPM | BODY MASS INDEX: 48.82 KG/M2

## 2023-11-27 DIAGNOSIS — I48.0 PAROXYSMAL ATRIAL FIBRILLATION (HCC): ICD-10-CM

## 2023-11-27 DIAGNOSIS — G47.33 OSA (OBSTRUCTIVE SLEEP APNEA): Primary | ICD-10-CM

## 2023-11-27 PROCEDURE — G8427 DOCREV CUR MEDS BY ELIG CLIN: HCPCS | Performed by: INTERNAL MEDICINE

## 2023-11-27 PROCEDURE — 3078F DIAST BP <80 MM HG: CPT | Performed by: INTERNAL MEDICINE

## 2023-11-27 PROCEDURE — G8484 FLU IMMUNIZE NO ADMIN: HCPCS | Performed by: INTERNAL MEDICINE

## 2023-11-27 PROCEDURE — 3074F SYST BP LT 130 MM HG: CPT | Performed by: INTERNAL MEDICINE

## 2023-11-27 PROCEDURE — G8417 CALC BMI ABV UP PARAM F/U: HCPCS | Performed by: INTERNAL MEDICINE

## 2023-11-27 PROCEDURE — 3017F COLORECTAL CA SCREEN DOC REV: CPT | Performed by: INTERNAL MEDICINE

## 2023-11-27 PROCEDURE — 1036F TOBACCO NON-USER: CPT | Performed by: INTERNAL MEDICINE

## 2023-11-27 PROCEDURE — 99214 OFFICE O/P EST MOD 30 MIN: CPT | Performed by: INTERNAL MEDICINE

## 2023-11-27 RX ORDER — METOPROLOL TARTRATE 50 MG/1
50 TABLET, FILM COATED ORAL 2 TIMES DAILY
COMMUNITY
Start: 2023-10-24

## 2023-11-27 NOTE — PATIENT INSTRUCTIONS
Continue current cardiac medications. Refer to sleep medicine for sleep study   Follow up in 6 months, sooner if needed.

## 2023-11-27 NOTE — PROGRESS NOTES
arteries (cardiac cath 5/4/2021 in [place]). Hypertension  Hyperlipidemia  Hypothyroidism  Obesity  Possible JACOB. RECOMMENDATIONS: Ms. Jackelyn Goldberg is recommended:  Continue current cardiac medications. Refer to sleep medicine for sleep study   Follow up in 6 months, sooner if needed. Thank you very much for allowing me to participate in Ms. Richey's cardiac care. Should you have any questions, please do not hesitate to contact me.      Sincerely,    Irwin Calvillo DO, [address], [address]

## 2023-11-29 ENCOUNTER — OFFICE VISIT (OUTPATIENT)
Dept: FAMILY MEDICINE CLINIC | Age: 63
End: 2023-11-29
Payer: COMMERCIAL

## 2023-11-29 VITALS
WEIGHT: 293 LBS | HEIGHT: 65 IN | HEART RATE: 67 BPM | OXYGEN SATURATION: 100 % | DIASTOLIC BLOOD PRESSURE: 70 MMHG | BODY MASS INDEX: 48.82 KG/M2 | TEMPERATURE: 97.7 F | SYSTOLIC BLOOD PRESSURE: 113 MMHG

## 2023-11-29 DIAGNOSIS — R53.1 WEAKNESS: ICD-10-CM

## 2023-11-29 DIAGNOSIS — N39.0 CHRONIC UTI: ICD-10-CM

## 2023-11-29 DIAGNOSIS — R35.0 URINARY FREQUENCY: ICD-10-CM

## 2023-11-29 DIAGNOSIS — R32 URINARY INCONTINENCE, UNSPECIFIED TYPE: ICD-10-CM

## 2023-11-29 DIAGNOSIS — N39.0 CHRONIC UTI: Primary | ICD-10-CM

## 2023-11-29 DIAGNOSIS — R26.2 DIFFICULTY IN WALKING: ICD-10-CM

## 2023-11-29 DIAGNOSIS — I50.9 HEART FAILURE, UNSPECIFIED HF CHRONICITY, UNSPECIFIED HEART FAILURE TYPE (HCC): ICD-10-CM

## 2023-11-29 LAB
BILIRUBIN, POC: NORMAL
BLOOD URINE, POC: NORMAL
CLARITY, POC: NORMAL
COLOR, POC: NORMAL
GLUCOSE URINE, POC: NORMAL
KETONES, POC: NORMAL
LEUKOCYTE EST, POC: NORMAL
NITRITE, POC: NORMAL
PH, POC: 5.5
PROTEIN, POC: NORMAL
SPECIFIC GRAVITY, POC: 1.01
UROBILINOGEN, POC: NORMAL

## 2023-11-29 PROCEDURE — G8427 DOCREV CUR MEDS BY ELIG CLIN: HCPCS | Performed by: NURSE PRACTITIONER

## 2023-11-29 PROCEDURE — G8484 FLU IMMUNIZE NO ADMIN: HCPCS | Performed by: NURSE PRACTITIONER

## 2023-11-29 PROCEDURE — G8417 CALC BMI ABV UP PARAM F/U: HCPCS | Performed by: NURSE PRACTITIONER

## 2023-11-29 PROCEDURE — 81003 URINALYSIS AUTO W/O SCOPE: CPT | Performed by: NURSE PRACTITIONER

## 2023-11-29 PROCEDURE — 3017F COLORECTAL CA SCREEN DOC REV: CPT | Performed by: NURSE PRACTITIONER

## 2023-11-29 PROCEDURE — 1036F TOBACCO NON-USER: CPT | Performed by: NURSE PRACTITIONER

## 2023-11-29 PROCEDURE — 3078F DIAST BP <80 MM HG: CPT | Performed by: NURSE PRACTITIONER

## 2023-11-29 PROCEDURE — 99214 OFFICE O/P EST MOD 30 MIN: CPT | Performed by: NURSE PRACTITIONER

## 2023-11-29 PROCEDURE — 3074F SYST BP LT 130 MM HG: CPT | Performed by: NURSE PRACTITIONER

## 2023-11-29 RX ORDER — NITROFURANTOIN 25; 75 MG/1; MG/1
100 CAPSULE ORAL 2 TIMES DAILY
Qty: 20 CAPSULE | Refills: 0 | Status: SHIPPED | OUTPATIENT
Start: 2023-11-29 | End: 2023-12-09

## 2023-11-29 NOTE — PROGRESS NOTES
Subjective:      Patient ID: Meredith Grimm is a 59 y.o. female who presents today for:     Chief Complaint   Patient presents with    Urinary Frequency     Patient presents today c/o urinary frequency. OTHER     Patient needs handicap placard. HPI Pt reports chronic UTI for the last year. She reports that she has incontinence and does not have typical urinary symptoms. She reports that she was hospitalized in August it was so severe. Reports she finished 10 day course of antibiotics about a week ago. She reports some unsteadiness and nausea which is common with UTI. Pt reports that since being hospitalized and doing rehab she was supposed to do home health therapy but has not had any. She has a difficult time walking in to buildings and getting around places. Past Medical History:   Diagnosis Date    Atrial fibrillation ([address])     Hyperlipidemia     Hypertension     Thyroid disease      Past Surgical History:   Procedure Laterality Date    JOINT REPLACEMENT      THYROIDECTOMY       History reviewed. No pertinent family history.   Social History     Socioeconomic History    Marital status:      Spouse name: Not on file    Number of children: Not on file    Years of education: Not on file    Highest education level: Not on file   Occupational History    Not on file   Tobacco Use    Smoking status: Never     Passive exposure: Never    Smokeless tobacco: Never   Vaping Use    Vaping Use: Never used   Substance and Sexual Activity    Alcohol use: Not Currently    Drug use: Never    Sexual activity: Defer   Other Topics Concern    Not on file   Social History Narrative    Not on file     Social Determinants of Health     Financial Resource Strain: Low Risk  (10/10/2023)    Overall Financial Resource Strain (CARDIA)     Difficulty of Paying Living Expenses: Not hard at all   Food Insecurity: Not on file (10/10/2023)   Transportation Needs: Unmet Transportation Needs (10/10/2023)    Pretty Patel

## 2023-11-30 ENCOUNTER — TELEPHONE (OUTPATIENT)
Dept: FAMILY MEDICINE CLINIC | Age: 63
End: 2023-11-30

## 2023-12-02 LAB
BACTERIA UR CULT: ABNORMAL
BACTERIA UR CULT: ABNORMAL
ORGANISM: ABNORMAL

## 2023-12-04 DIAGNOSIS — N39.0 URINARY TRACT INFECTION WITHOUT HEMATURIA, SITE UNSPECIFIED: Primary | ICD-10-CM

## 2023-12-04 RX ORDER — SULFAMETHOXAZOLE AND TRIMETHOPRIM 800; 160 MG/1; MG/1
1 TABLET ORAL 2 TIMES DAILY
Qty: 14 TABLET | Refills: 0 | Status: SHIPPED | OUTPATIENT
Start: 2023-12-04 | End: 2023-12-11

## 2023-12-04 ASSESSMENT — ENCOUNTER SYMPTOMS
WHEEZING: 0
ABDOMINAL PAIN: 0
SHORTNESS OF BREATH: 0
COUGH: 0

## 2023-12-19 DIAGNOSIS — N39.0 RECURRENT UTI: ICD-10-CM

## 2023-12-19 LAB
BILIRUB UR QL STRIP: NEGATIVE
CLARITY UR: CLEAR
COLOR UR: YELLOW
GLUCOSE UR STRIP-MCNC: NEGATIVE MG/DL
HGB UR QL STRIP: NEGATIVE
KETONES UR STRIP-MCNC: NEGATIVE MG/DL
LEUKOCYTE ESTERASE UR QL STRIP: NEGATIVE
NITRITE UR QL STRIP: NEGATIVE
PH UR STRIP: 5 [PH] (ref 5–9)
PROT UR STRIP-MCNC: NEGATIVE MG/DL
SP GR UR STRIP: 1.02 (ref 1–1.03)
UROBILINOGEN UR STRIP-ACNC: 0.2 E.U./DL

## 2023-12-20 LAB — BACTERIA UR CULT: NORMAL

## 2023-12-26 DIAGNOSIS — R92.8 ABNORMAL MAMMOGRAM: Primary | ICD-10-CM

## 2023-12-27 ENCOUNTER — OFFICE VISIT (OUTPATIENT)
Dept: OBGYN CLINIC | Age: 63
End: 2023-12-27
Payer: COMMERCIAL

## 2023-12-27 VITALS
HEIGHT: 65 IN | DIASTOLIC BLOOD PRESSURE: 84 MMHG | HEART RATE: 68 BPM | BODY MASS INDEX: 48.82 KG/M2 | WEIGHT: 293 LBS | SYSTOLIC BLOOD PRESSURE: 132 MMHG

## 2023-12-27 DIAGNOSIS — N39.41 URGE INCONTINENCE OF URINE: Primary | ICD-10-CM

## 2023-12-27 DIAGNOSIS — N39.0 RECURRENT UTI: ICD-10-CM

## 2023-12-27 PROCEDURE — 3079F DIAST BP 80-89 MM HG: CPT | Performed by: OBSTETRICS & GYNECOLOGY

## 2023-12-27 PROCEDURE — 3075F SYST BP GE 130 - 139MM HG: CPT | Performed by: OBSTETRICS & GYNECOLOGY

## 2023-12-27 PROCEDURE — G8417 CALC BMI ABV UP PARAM F/U: HCPCS | Performed by: OBSTETRICS & GYNECOLOGY

## 2023-12-27 PROCEDURE — G8427 DOCREV CUR MEDS BY ELIG CLIN: HCPCS | Performed by: OBSTETRICS & GYNECOLOGY

## 2023-12-27 PROCEDURE — 3017F COLORECTAL CA SCREEN DOC REV: CPT | Performed by: OBSTETRICS & GYNECOLOGY

## 2023-12-27 PROCEDURE — 1036F TOBACCO NON-USER: CPT | Performed by: OBSTETRICS & GYNECOLOGY

## 2023-12-27 PROCEDURE — G8484 FLU IMMUNIZE NO ADMIN: HCPCS | Performed by: OBSTETRICS & GYNECOLOGY

## 2023-12-27 PROCEDURE — 99213 OFFICE O/P EST LOW 20 MIN: CPT | Performed by: OBSTETRICS & GYNECOLOGY

## 2023-12-27 RX ORDER — TROSPIUM CHLORIDE ER 60 MG/1
60 CAPSULE ORAL DAILY
Qty: 30 CAPSULE | Refills: 1 | Status: SHIPPED | OUTPATIENT
Start: 2023-12-27

## 2023-12-28 ENCOUNTER — OFFICE VISIT (OUTPATIENT)
Dept: PULMONOLOGY | Age: 63
End: 2023-12-28
Payer: COMMERCIAL

## 2023-12-28 VITALS
BODY MASS INDEX: 41.02 KG/M2 | WEIGHT: 293 LBS | HEIGHT: 71 IN | SYSTOLIC BLOOD PRESSURE: 122 MMHG | TEMPERATURE: 97.8 F | OXYGEN SATURATION: 98 % | DIASTOLIC BLOOD PRESSURE: 64 MMHG | HEART RATE: 68 BPM

## 2023-12-28 DIAGNOSIS — G47.33 OSA (OBSTRUCTIVE SLEEP APNEA): Primary | ICD-10-CM

## 2023-12-28 DIAGNOSIS — E66.9 OBESITY, UNSPECIFIED CLASSIFICATION, UNSPECIFIED OBESITY TYPE, UNSPECIFIED WHETHER SERIOUS COMORBIDITY PRESENT: ICD-10-CM

## 2023-12-28 DIAGNOSIS — I10 HYPERTENSION, UNSPECIFIED TYPE: ICD-10-CM

## 2023-12-28 PROCEDURE — 99203 OFFICE O/P NEW LOW 30 MIN: CPT | Performed by: INTERNAL MEDICINE

## 2023-12-28 PROCEDURE — 1036F TOBACCO NON-USER: CPT | Performed by: INTERNAL MEDICINE

## 2023-12-28 PROCEDURE — G8417 CALC BMI ABV UP PARAM F/U: HCPCS | Performed by: INTERNAL MEDICINE

## 2023-12-28 PROCEDURE — 3078F DIAST BP <80 MM HG: CPT | Performed by: INTERNAL MEDICINE

## 2023-12-28 PROCEDURE — G8484 FLU IMMUNIZE NO ADMIN: HCPCS | Performed by: INTERNAL MEDICINE

## 2023-12-28 PROCEDURE — G8428 CUR MEDS NOT DOCUMENT: HCPCS | Performed by: INTERNAL MEDICINE

## 2023-12-28 PROCEDURE — 3017F COLORECTAL CA SCREEN DOC REV: CPT | Performed by: INTERNAL MEDICINE

## 2023-12-28 PROCEDURE — 3074F SYST BP LT 130 MM HG: CPT | Performed by: INTERNAL MEDICINE

## 2023-12-28 NOTE — PROGRESS NOTES
10. 87\")          General appearance: Well appearing. No acute distress. AAOX3  Head: Normocephalic, without obvious abnormality, atraumatic   Eyes:Pupils bilateral equal and reactive, EOM intact. Normal sclera and conjunctiva   Nose: Mucosa pink  Throat: Clear,  [place] 3  Neck: Supple, No JVD. Nothyromegaly. Neck is thick  Lungs: Clear bilaterally. No wheezing. No crackles. No use of accessory muscles. Heart: RRR, S1, S2 normal, no murmur, click, rub or gallop   Abdomen: soft, non-tender, nondistended. Bowel sounds normal. No hernia. No organomegaly. Extremities: extremities normal, atraumatic, no cyanosis, no edema  Skin: Skin color, texture, turgor normal. No rashes or lesions   Neurological: No focal deficits,cranial nerves grossly intact. No weakness. Sensation normal   Psych: Normal Mood  Musculoskeletal: No joint abnormalities. Impression:   Diagnosis Orders   1. JACOB (obstructive sleep apnea)  Sleep Study with PAP Titration    Home Sleep Study      2. Obesity, unspecified classification, unspecified obesity type, unspecified whether serious comorbidity present        3. Hypertension, unspecified type             Orders Placed This Encounter   Procedures    Sleep Study with PAP Titration     Order Specific Question:   Sleep Study Titration Type     Answer:   Split Night Study (Baseline followed by PAP Titration)    Home Sleep Study     Standing Status:   Future     Standing Expiration Date:   3/28/2024     Order Specific Question:   Location For Sleep Study     Answer:   [place]     Order Specific Question:   Select Sleep Lab Location     Answer:   [facility]          Recommendations:     -She is at risk for sleep disordered breathing. I will order a home sleep study.   Technicalities of sleep study explained to the patient.  -Weight loss and exercise has been advised.  -No driving if sleepy or drowsy or otherwise impaired     Return in about 2 months (around

## 2024-01-02 ENCOUNTER — PROCEDURE VISIT (OUTPATIENT)
Dept: OBGYN CLINIC | Age: 64
End: 2024-01-02

## 2024-01-02 DIAGNOSIS — N39.41 URGE INCONTINENCE OF URINE: Primary | ICD-10-CM

## 2024-01-05 ENCOUNTER — OFFICE VISIT (OUTPATIENT)
Dept: OBGYN CLINIC | Age: 64
End: 2024-01-05
Payer: COMMERCIAL

## 2024-01-05 VITALS
SYSTOLIC BLOOD PRESSURE: 116 MMHG | HEIGHT: 70 IN | HEART RATE: 84 BPM | DIASTOLIC BLOOD PRESSURE: 64 MMHG | BODY MASS INDEX: 41.95 KG/M2 | WEIGHT: 293 LBS

## 2024-01-05 DIAGNOSIS — N39.41 URGE INCONTINENCE: ICD-10-CM

## 2024-01-05 PROCEDURE — 99214 OFFICE O/P EST MOD 30 MIN: CPT | Performed by: OBSTETRICS & GYNECOLOGY

## 2024-01-05 PROCEDURE — G8417 CALC BMI ABV UP PARAM F/U: HCPCS | Performed by: OBSTETRICS & GYNECOLOGY

## 2024-01-05 PROCEDURE — G8427 DOCREV CUR MEDS BY ELIG CLIN: HCPCS | Performed by: OBSTETRICS & GYNECOLOGY

## 2024-01-05 PROCEDURE — G8484 FLU IMMUNIZE NO ADMIN: HCPCS | Performed by: OBSTETRICS & GYNECOLOGY

## 2024-01-05 PROCEDURE — 3017F COLORECTAL CA SCREEN DOC REV: CPT | Performed by: OBSTETRICS & GYNECOLOGY

## 2024-01-05 PROCEDURE — 1036F TOBACCO NON-USER: CPT | Performed by: OBSTETRICS & GYNECOLOGY

## 2024-01-05 PROCEDURE — 3074F SYST BP LT 130 MM HG: CPT | Performed by: OBSTETRICS & GYNECOLOGY

## 2024-01-05 PROCEDURE — 3078F DIAST BP <80 MM HG: CPT | Performed by: OBSTETRICS & GYNECOLOGY

## 2024-01-05 RX ORDER — SODIUM CHLORIDE 9 MG/ML
INJECTION, SOLUTION INTRAVENOUS PRN
OUTPATIENT
Start: 2024-01-05

## 2024-01-05 RX ORDER — SODIUM CHLORIDE 0.9 % (FLUSH) 0.9 %
5-40 SYRINGE (ML) INJECTION EVERY 12 HOURS SCHEDULED
OUTPATIENT
Start: 2024-01-05

## 2024-01-05 RX ORDER — SODIUM CHLORIDE 0.9 % (FLUSH) 0.9 %
5-40 SYRINGE (ML) INJECTION PRN
OUTPATIENT
Start: 2024-01-05

## 2024-01-05 RX ORDER — SODIUM CHLORIDE, SODIUM LACTATE, POTASSIUM CHLORIDE, CALCIUM CHLORIDE 600; 310; 30; 20 MG/100ML; MG/100ML; MG/100ML; MG/100ML
INJECTION, SOLUTION INTRAVENOUS CONTINUOUS
OUTPATIENT
Start: 2024-01-05

## 2024-01-17 NOTE — PROGRESS NOTES
PAT Protocol     Standing Status:   Future     Standing Expiration Date:   3/5/2024     No orders of the defined types were placed in this encounter.      Follow Up:  No follow-ups on file.        Sabine Bradford MD

## 2024-01-29 ENCOUNTER — HOSPITAL ENCOUNTER (OUTPATIENT)
Dept: PREADMISSION TESTING | Age: 64
Discharge: HOME | End: 2024-02-02

## 2024-01-29 VITALS
BODY MASS INDEX: 44.41 KG/M2 | HEART RATE: 73 BPM | DIASTOLIC BLOOD PRESSURE: 63 MMHG | HEIGHT: 68 IN | WEIGHT: 293 LBS | RESPIRATION RATE: 18 BRPM | OXYGEN SATURATION: 98 % | SYSTOLIC BLOOD PRESSURE: 119 MMHG | TEMPERATURE: 97.5 F

## 2024-01-29 RX ORDER — LEVOTHYROXINE SODIUM 0.2 MG/1
200 TABLET ORAL DAILY
Qty: 30 TABLET | Refills: 0 | Status: SHIPPED | OUTPATIENT
Start: 2024-01-29

## 2024-01-29 NOTE — TELEPHONE ENCOUNTER
Rx requested:  Requested Prescriptions     Pending Prescriptions Disp Refills    levothyroxine (SYNTHROID) 200 MCG tablet 30 tablet 0     Sig: Take 1 tablet by mouth Daily Indications: Underactive Thyroid         Last Office Visit:   11/29/2023      Next Visit Date:  Future Appointments   Date Time Provider Department Center   2/20/2024 11:30 AM Kirsten Aguilera APRN - CNP [facility]   2/21/2024 11:15 AM Sabine Bradford MD [facility]   6/12/2024 12:40 PM Beverley Adam DO [facility] Tanesha Ruggiero

## 2024-02-08 ENCOUNTER — ANESTHESIA EVENT (OUTPATIENT)
Dept: OPERATING ROOM | Age: 64
End: 2024-02-08
Payer: COMMERCIAL

## 2024-02-08 ENCOUNTER — ANESTHESIA (OUTPATIENT)
Dept: OPERATING ROOM | Age: 64
End: 2024-02-08
Payer: COMMERCIAL

## 2024-02-08 ENCOUNTER — APPOINTMENT (OUTPATIENT)
Dept: GENERAL RADIOLOGY | Age: 64
End: 2024-02-08
Attending: OBSTETRICS & GYNECOLOGY
Payer: COMMERCIAL

## 2024-02-08 ENCOUNTER — HOSPITAL ENCOUNTER (OUTPATIENT)
Age: 64
Setting detail: OUTPATIENT SURGERY
Discharge: HOME | End: 2024-02-08
Attending: OBSTETRICS & GYNECOLOGY | Admitting: OBSTETRICS & GYNECOLOGY
Payer: COMMERCIAL

## 2024-02-08 VITALS
RESPIRATION RATE: 20 BRPM | OXYGEN SATURATION: 99 % | HEART RATE: 77 BPM | DIASTOLIC BLOOD PRESSURE: 77 MMHG | TEMPERATURE: 97 F | SYSTOLIC BLOOD PRESSURE: 118 MMHG

## 2024-02-08 DIAGNOSIS — R52 PAIN: ICD-10-CM

## 2024-02-08 DIAGNOSIS — Z09 POSTOP CHECK: Primary | ICD-10-CM

## 2024-02-08 DIAGNOSIS — G89.18 POSTOPERATIVE PAIN: ICD-10-CM

## 2024-02-08 PROCEDURE — 2580000003 HC RX 258: Performed by: OBSTETRICS & GYNECOLOGY

## 2024-02-08 PROCEDURE — 3600000004 HC SURGERY LEVEL 4 BASE: Performed by: OBSTETRICS & GYNECOLOGY

## 2024-02-08 PROCEDURE — 2500000003 HC RX 250 WO HCPCS: Performed by: NURSE ANESTHETIST, CERTIFIED REGISTERED

## 2024-02-08 PROCEDURE — C1767 GENERATOR, NEURO NON-RECHARG: HCPCS | Performed by: OBSTETRICS & GYNECOLOGY

## 2024-02-08 PROCEDURE — 6360000002 HC RX W HCPCS: Performed by: OBSTETRICS & GYNECOLOGY

## 2024-02-08 PROCEDURE — A4217 STERILE WATER/SALINE, 500 ML: HCPCS | Performed by: OBSTETRICS & GYNECOLOGY

## 2024-02-08 PROCEDURE — 6360000002 HC RX W HCPCS: Performed by: REGISTERED NURSE

## 2024-02-08 PROCEDURE — 7100000011 HC PHASE II RECOVERY - ADDTL 15 MIN: Performed by: OBSTETRICS & GYNECOLOGY

## 2024-02-08 PROCEDURE — C1897 LEAD, NEUROSTIM TEST KIT: HCPCS | Performed by: OBSTETRICS & GYNECOLOGY

## 2024-02-08 PROCEDURE — 64561 IMPLANT NEUROELECTRODES: CPT | Performed by: OBSTETRICS & GYNECOLOGY

## 2024-02-08 PROCEDURE — 6370000000 HC RX 637 (ALT 250 FOR IP)

## 2024-02-08 PROCEDURE — 3600000014 HC SURGERY LEVEL 4 ADDTL 15MIN: Performed by: OBSTETRICS & GYNECOLOGY

## 2024-02-08 PROCEDURE — 3700000001 HC ADD 15 MINUTES (ANESTHESIA): Performed by: OBSTETRICS & GYNECOLOGY

## 2024-02-08 PROCEDURE — C1778 LEAD, NEUROSTIMULATOR: HCPCS | Performed by: OBSTETRICS & GYNECOLOGY

## 2024-02-08 PROCEDURE — C1787 PATIENT PROGR, NEUROSTIM: HCPCS | Performed by: OBSTETRICS & GYNECOLOGY

## 2024-02-08 PROCEDURE — C1781 MESH (IMPLANTABLE): HCPCS | Performed by: OBSTETRICS & GYNECOLOGY

## 2024-02-08 PROCEDURE — 2500000003 HC RX 250 WO HCPCS: Performed by: OBSTETRICS & GYNECOLOGY

## 2024-02-08 PROCEDURE — 76000 FLUOROSCOPY <1 HR PHYS/QHP: CPT | Performed by: OBSTETRICS & GYNECOLOGY

## 2024-02-08 PROCEDURE — 6360000002 HC RX W HCPCS: Performed by: NURSE ANESTHETIST, CERTIFIED REGISTERED

## 2024-02-08 PROCEDURE — 2709999900 HC NON-CHARGEABLE SUPPLY: Performed by: OBSTETRICS & GYNECOLOGY

## 2024-02-08 PROCEDURE — 95972 ALYS CPLX SP/PN NPGT W/PRGRM: CPT | Performed by: OBSTETRICS & GYNECOLOGY

## 2024-02-08 PROCEDURE — 7100000000 HC PACU RECOVERY - FIRST 15 MIN: Performed by: OBSTETRICS & GYNECOLOGY

## 2024-02-08 PROCEDURE — 7100000010 HC PHASE II RECOVERY - FIRST 15 MIN: Performed by: OBSTETRICS & GYNECOLOGY

## 2024-02-08 PROCEDURE — 3700000000 HC ANESTHESIA ATTENDED CARE: Performed by: OBSTETRICS & GYNECOLOGY

## 2024-02-08 PROCEDURE — 7100000001 HC PACU RECOVERY - ADDTL 15 MIN: Performed by: OBSTETRICS & GYNECOLOGY

## 2024-02-08 PROCEDURE — 64590 INS/RPL PRPH SAC/GSTR NPG/R: CPT | Performed by: OBSTETRICS & GYNECOLOGY

## 2024-02-08 DEVICE — KIT LEAD SURE SCAN INTERSTIM MRI: Type: IMPLANTABLE DEVICE | Site: SACRUM | Status: FUNCTIONAL

## 2024-02-08 DEVICE — POUCH TYRX NEURO ANTIMICROBIAL MED: Type: IMPLANTABLE DEVICE | Site: BUTTOCKS | Status: FUNCTIONAL

## 2024-02-08 DEVICE — NEUROSTIMULATOR INTERSTIM X RECHRGE FREE TORQ WRNCH PROD: Type: IMPLANTABLE DEVICE | Site: BUTTOCKS | Status: FUNCTIONAL

## 2024-02-08 RX ORDER — SODIUM CHLORIDE 0.9 % (FLUSH) 0.9 %
5-40 SYRINGE (ML) INJECTION PRN
Status: DISCONTINUED | OUTPATIENT
Start: 2024-02-08 | End: 2024-02-08 | Stop reason: HOSPADM

## 2024-02-08 RX ORDER — OXYCODONE HYDROCHLORIDE 5 MG/1
5 TABLET ORAL PRN
Status: DISCONTINUED | OUTPATIENT
Start: 2024-02-08 | End: 2024-02-08 | Stop reason: HOSPADM

## 2024-02-08 RX ORDER — SODIUM CHLORIDE 0.9 % (FLUSH) 0.9 %
5-40 SYRINGE (ML) INJECTION EVERY 12 HOURS SCHEDULED
Status: DISCONTINUED | OUTPATIENT
Start: 2024-02-08 | End: 2024-02-08 | Stop reason: HOSPADM

## 2024-02-08 RX ORDER — LIDOCAINE HYDROCHLORIDE 10 MG/ML
INJECTION, SOLUTION EPIDURAL; INFILTRATION; INTRACAUDAL; PERINEURAL PRN
Status: DISCONTINUED | OUTPATIENT
Start: 2024-02-08 | End: 2024-02-08 | Stop reason: SDUPTHER

## 2024-02-08 RX ORDER — PROCHLORPERAZINE EDISYLATE 5 MG/ML
5 INJECTION INTRAMUSCULAR; INTRAVENOUS
Status: DISCONTINUED | OUTPATIENT
Start: 2024-02-08 | End: 2024-02-08 | Stop reason: HOSPADM

## 2024-02-08 RX ORDER — DIPHENHYDRAMINE HYDROCHLORIDE 50 MG/ML
12.5 INJECTION INTRAMUSCULAR; INTRAVENOUS
Status: DISCONTINUED | OUTPATIENT
Start: 2024-02-08 | End: 2024-02-08 | Stop reason: HOSPADM

## 2024-02-08 RX ORDER — FENTANYL CITRATE 50 UG/ML
INJECTION, SOLUTION INTRAMUSCULAR; INTRAVENOUS
Status: DISCONTINUED
Start: 2024-02-08 | End: 2024-02-08 | Stop reason: HOSPADM

## 2024-02-08 RX ORDER — ONDANSETRON 2 MG/ML
4 INJECTION INTRAMUSCULAR; INTRAVENOUS EVERY 6 HOURS PRN
Status: DISCONTINUED | OUTPATIENT
Start: 2024-02-08 | End: 2024-02-08 | Stop reason: HOSPADM

## 2024-02-08 RX ORDER — SODIUM CHLORIDE, SODIUM LACTATE, POTASSIUM CHLORIDE, CALCIUM CHLORIDE 600; 310; 30; 20 MG/100ML; MG/100ML; MG/100ML; MG/100ML
INJECTION, SOLUTION INTRAVENOUS CONTINUOUS
Status: DISCONTINUED | OUTPATIENT
Start: 2024-02-08 | End: 2024-02-08 | Stop reason: HOSPADM

## 2024-02-08 RX ORDER — KETOROLAC TROMETHAMINE 30 MG/ML
30 INJECTION, SOLUTION INTRAMUSCULAR; INTRAVENOUS EVERY 6 HOURS
Status: DISCONTINUED | OUTPATIENT
Start: 2024-02-08 | End: 2024-02-08 | Stop reason: HOSPADM

## 2024-02-08 RX ORDER — OXYCODONE HYDROCHLORIDE AND ACETAMINOPHEN 5; 325 MG/1; MG/1
2 TABLET ORAL NIGHTLY PRN
Qty: 10 TABLET | Refills: 0 | Status: SHIPPED | OUTPATIENT
Start: 2024-02-08 | End: 2024-02-13

## 2024-02-08 RX ORDER — FENTANYL CITRATE 0.05 MG/ML
50 INJECTION, SOLUTION INTRAMUSCULAR; INTRAVENOUS EVERY 5 MIN PRN
Status: DISCONTINUED | OUTPATIENT
Start: 2024-02-08 | End: 2024-02-08 | Stop reason: HOSPADM

## 2024-02-08 RX ORDER — FENTANYL CITRATE 50 UG/ML
INJECTION, SOLUTION INTRAMUSCULAR; INTRAVENOUS PRN
Status: DISCONTINUED | OUTPATIENT
Start: 2024-02-08 | End: 2024-02-08 | Stop reason: SDUPTHER

## 2024-02-08 RX ORDER — HYDRALAZINE HYDROCHLORIDE 20 MG/ML
10 INJECTION INTRAMUSCULAR; INTRAVENOUS
Status: DISCONTINUED | OUTPATIENT
Start: 2024-02-08 | End: 2024-02-08 | Stop reason: HOSPADM

## 2024-02-08 RX ORDER — EPHEDRINE SULFATE/0.9% NACL/PF 50 MG/5 ML
SYRINGE (ML) INTRAVENOUS PRN
Status: DISCONTINUED | OUTPATIENT
Start: 2024-02-08 | End: 2024-02-08 | Stop reason: SDUPTHER

## 2024-02-08 RX ORDER — OXYCODONE HYDROCHLORIDE 5 MG/1
10 TABLET ORAL PRN
Status: DISCONTINUED | OUTPATIENT
Start: 2024-02-08 | End: 2024-02-08 | Stop reason: HOSPADM

## 2024-02-08 RX ORDER — PROPOFOL 10 MG/ML
INJECTION, EMULSION INTRAVENOUS PRN
Status: DISCONTINUED | OUTPATIENT
Start: 2024-02-08 | End: 2024-02-08 | Stop reason: SDUPTHER

## 2024-02-08 RX ORDER — SODIUM CHLORIDE 9 MG/ML
INJECTION, SOLUTION INTRAVENOUS PRN
Status: DISCONTINUED | OUTPATIENT
Start: 2024-02-08 | End: 2024-02-08 | Stop reason: HOSPADM

## 2024-02-08 RX ORDER — OXYCODONE HYDROCHLORIDE 5 MG/1
10 TABLET ORAL EVERY 4 HOURS PRN
Status: DISCONTINUED | OUTPATIENT
Start: 2024-02-08 | End: 2024-02-08 | Stop reason: HOSPADM

## 2024-02-08 RX ORDER — ACETAMINOPHEN 500 MG
1000 TABLET ORAL EVERY 6 HOURS PRN
Qty: 60 TABLET | Refills: 0 | Status: SHIPPED | OUTPATIENT
Start: 2024-02-08

## 2024-02-08 RX ORDER — ONDANSETRON 4 MG/1
4 TABLET, FILM COATED ORAL EVERY 6 HOURS PRN
Qty: 30 TABLET | Refills: 1 | Status: SHIPPED | OUTPATIENT
Start: 2024-02-08

## 2024-02-08 RX ORDER — AMOXICILLIN AND CLAVULANATE POTASSIUM 875; 125 MG/1; MG/1
1 TABLET, FILM COATED ORAL 2 TIMES DAILY
Qty: 14 TABLET | Refills: 0 | Status: SHIPPED | OUTPATIENT
Start: 2024-02-08 | End: 2024-02-15

## 2024-02-08 RX ORDER — LIDOCAINE HYDROCHLORIDE AND EPINEPHRINE 10; 10 MG/ML; UG/ML
INJECTION, SOLUTION INFILTRATION; PERINEURAL PRN
Status: DISCONTINUED | OUTPATIENT
Start: 2024-02-08 | End: 2024-02-08 | Stop reason: HOSPADM

## 2024-02-08 RX ORDER — FENTANYL CITRATE 0.05 MG/ML
25 INJECTION, SOLUTION INTRAMUSCULAR; INTRAVENOUS EVERY 5 MIN PRN
Status: DISCONTINUED | OUTPATIENT
Start: 2024-02-08 | End: 2024-02-08 | Stop reason: HOSPADM

## 2024-02-08 RX ORDER — MEPERIDINE HYDROCHLORIDE 25 MG/ML
12.5 INJECTION INTRAMUSCULAR; INTRAVENOUS; SUBCUTANEOUS EVERY 5 MIN PRN
Status: DISCONTINUED | OUTPATIENT
Start: 2024-02-08 | End: 2024-02-08 | Stop reason: HOSPADM

## 2024-02-08 RX ORDER — ONDANSETRON 2 MG/ML
INJECTION INTRAMUSCULAR; INTRAVENOUS PRN
Status: DISCONTINUED | OUTPATIENT
Start: 2024-02-08 | End: 2024-02-08 | Stop reason: SDUPTHER

## 2024-02-08 RX ORDER — MIDAZOLAM HYDROCHLORIDE 1 MG/ML
INJECTION INTRAMUSCULAR; INTRAVENOUS PRN
Status: DISCONTINUED | OUTPATIENT
Start: 2024-02-08 | End: 2024-02-08 | Stop reason: SDUPTHER

## 2024-02-08 RX ORDER — ACETAMINOPHEN 500 MG
1000 TABLET ORAL EVERY 8 HOURS PRN
Status: DISCONTINUED | OUTPATIENT
Start: 2024-02-08 | End: 2024-02-08 | Stop reason: HOSPADM

## 2024-02-08 RX ORDER — ONDANSETRON 4 MG/1
4 TABLET, ORALLY DISINTEGRATING ORAL EVERY 8 HOURS PRN
Status: DISCONTINUED | OUTPATIENT
Start: 2024-02-08 | End: 2024-02-08 | Stop reason: HOSPADM

## 2024-02-08 RX ORDER — DEXAMETHASONE SODIUM PHOSPHATE 10 MG/ML
INJECTION INTRAMUSCULAR; INTRAVENOUS PRN
Status: DISCONTINUED | OUTPATIENT
Start: 2024-02-08 | End: 2024-02-08 | Stop reason: SDUPTHER

## 2024-02-08 RX ORDER — IBUPROFEN 600 MG/1
600 TABLET ORAL EVERY 6 HOURS PRN
Qty: 60 TABLET | Refills: 1 | Status: SHIPPED | OUTPATIENT
Start: 2024-02-08

## 2024-02-08 RX ORDER — ROCURONIUM BROMIDE 10 MG/ML
INJECTION, SOLUTION INTRAVENOUS PRN
Status: DISCONTINUED | OUTPATIENT
Start: 2024-02-08 | End: 2024-02-08 | Stop reason: SDUPTHER

## 2024-02-08 RX ORDER — LABETALOL HYDROCHLORIDE 5 MG/ML
10 INJECTION, SOLUTION INTRAVENOUS
Status: DISCONTINUED | OUTPATIENT
Start: 2024-02-08 | End: 2024-02-08 | Stop reason: HOSPADM

## 2024-02-08 RX ORDER — ONDANSETRON 2 MG/ML
4 INJECTION INTRAMUSCULAR; INTRAVENOUS
Status: DISCONTINUED | OUTPATIENT
Start: 2024-02-08 | End: 2024-02-08 | Stop reason: HOSPADM

## 2024-02-08 RX ORDER — OXYCODONE HYDROCHLORIDE 5 MG/1
5 TABLET ORAL EVERY 4 HOURS PRN
Status: DISCONTINUED | OUTPATIENT
Start: 2024-02-08 | End: 2024-02-08 | Stop reason: HOSPADM

## 2024-02-08 RX ADMIN — Medication 10 MG: at 16:18

## 2024-02-08 RX ADMIN — PHENYLEPHRINE HYDROCHLORIDE 200 MCG: 10 INJECTION INTRAVENOUS at 16:18

## 2024-02-08 RX ADMIN — LIDOCAINE HYDROCHLORIDE 40 MG: 10 INJECTION, SOLUTION EPIDURAL; INFILTRATION; INTRACAUDAL; PERINEURAL at 15:53

## 2024-02-08 RX ADMIN — FENTANYL CITRATE 100 MCG: 50 INJECTION, SOLUTION INTRAMUSCULAR; INTRAVENOUS at 15:53

## 2024-02-08 RX ADMIN — PROPOFOL 170 MG: 10 INJECTION, EMULSION INTRAVENOUS at 15:54

## 2024-02-08 RX ADMIN — Medication 10 MG: at 16:26

## 2024-02-08 RX ADMIN — Medication 10 MG: at 16:58

## 2024-02-08 RX ADMIN — SUGAMMADEX 200 MG: 100 INJECTION, SOLUTION INTRAVENOUS at 16:36

## 2024-02-08 RX ADMIN — CEFOXITIN SODIUM 2000 MG: 2 POWDER, FOR SOLUTION INTRAVENOUS at 16:03

## 2024-02-08 RX ADMIN — PHENYLEPHRINE HYDROCHLORIDE 200 MCG: 10 INJECTION INTRAVENOUS at 17:01

## 2024-02-08 RX ADMIN — PHENYLEPHRINE HYDROCHLORIDE 200 MCG: 10 INJECTION INTRAVENOUS at 16:58

## 2024-02-08 RX ADMIN — PHENYLEPHRINE HYDROCHLORIDE 100 MCG: 10 INJECTION INTRAVENOUS at 16:11

## 2024-02-08 RX ADMIN — ONDANSETRON 4 MG: 2 INJECTION INTRAMUSCULAR; INTRAVENOUS at 17:11

## 2024-02-08 RX ADMIN — DEXAMETHASONE SODIUM PHOSPHATE 10 MG: 10 INJECTION INTRAMUSCULAR; INTRAVENOUS at 16:13

## 2024-02-08 RX ADMIN — SODIUM CHLORIDE, POTASSIUM CHLORIDE, SODIUM LACTATE AND CALCIUM CHLORIDE: 600; 310; 30; 20 INJECTION, SOLUTION INTRAVENOUS at 14:23

## 2024-02-08 RX ADMIN — PHENYLEPHRINE HYDROCHLORIDE 200 MCG: 10 INJECTION INTRAVENOUS at 16:25

## 2024-02-08 RX ADMIN — ROCURONIUM BROMIDE 50 MG: 10 INJECTION INTRAVENOUS at 15:54

## 2024-02-08 RX ADMIN — Medication 10 MG: at 16:30

## 2024-02-08 RX ADMIN — MIDAZOLAM HYDROCHLORIDE 2 MG: 1 INJECTION, SOLUTION INTRAMUSCULAR; INTRAVENOUS at 15:48

## 2024-02-08 ASSESSMENT — PAIN - FUNCTIONAL ASSESSMENT: PAIN_FUNCTIONAL_ASSESSMENT: 0-10

## 2024-02-08 NOTE — H&P
Cande Richey is a 63 y.o. female who presents here today for complaints of Follow-up (PNE done 24. )    oxybutynin and mirabegron with no success. Recent sanctura was only modestly effective. Patient did mention side effects as above.   I discussed botox vs interstim   Multiple medical problems and cardiovascular . Underwent PNE last visit , here for lead removal today . Reports significant urge symptom relief > 70-80 % . Wants to proceed with stage 1 and stage 2 interstim. Diary with data consistent with patient report and scanned in epic.   .        Vitals:  /64 (Site: Left Lower Arm, Position: Sitting, Cuff Size: Medium Adult)   Pulse 84   Ht 1.778 m (5' 10\")   Wt (!) 142.4 kg (314 lb)   BMI 45.05 kg/m²   Allergies:  Erythromycin and Allopurinol  Past Medical History        Past Medical History:   Diagnosis Date    Atrial fibrillation (HCC)      Hyperlipidemia      Hypertension      Thyroid disease           Past Surgical History         Past Surgical History:   Procedure Laterality Date    JOINT REPLACEMENT        THYROIDECTOMY             OB History            4    Para   4    Term   4            AB        Living               SAB        IAB        Ectopic        Molar        Multiple        Live Births   4                Family History         Family History   Problem Relation Age of Onset    Breast Cancer Sister           Social History               Socioeconomic History    Marital status:        Spouse name: Not on file    Number of children: Not on file    Years of education: Not on file    Highest education level: Not on file   Occupational History    Not on file   Tobacco Use    Smoking status: Never       Passive exposure: Never    Smokeless tobacco: Never   Vaping Use    Vaping Use: Never used   Substance and Sexual Activity    Alcohol use: Not Currently    Drug use: Never    Sexual activity: Defer   Other Topics Concern    Not on file   Social History Narrative  Evaluation (PNE) is a test done in the office to determine if InterStim™ therapy  will improve your urinary symptoms. InterStim™ therapy is an FDA-approved treatment for urinary  urgency, frequency, urge incontinence, and retention. It is a small device that is implanted under the  skin of the upper buttocks. It works by gently stimulating the sacral nerves to help the bladder function  more normally.  Percutaneous Nerve Evaluation:  For the PNE, a temporary wire will be placed along side the third sacral nerve in your lower back. This  nerve controls bladder function. The wire is the size of a thick hair and will carry gentle electrical  pulses to the nerve. The wire will be inserted by myself Dr Bradford using a local anesthetic. I  will know that the lead is in the correct position when you feel a tapping, pulsing, vibrating or tingling  sensation in the vaginal or rectal area. Once the wire is in the correct position, it will exit the skin and  be connected to a portable stimulator box that can be clipped to the waistband of your clothing. You  will be able to turn the stimulation up, down, on, and off with the portable stimulator.  For the next week, you will monitor changes in your symptoms. You will then follow-up in the office  to discuss changes in your urinary symptoms and to have the temporary wire removed. If your  symptoms have improved by at least 50 percent, you will be given the option to proceed with  InterStim™ placement. This procedure will be scheduled at the next available date convenient for you.  InterStim Placement:  A permanent wire (“lead”) will be inserted and attached to a small neurostimulator battery that will be  implanted under the skin in the upper buttock. This will be done in the operating room as an  outpatient procedure with a local anesthetic and I.V. sedation. After the InterStim™ is implanted, you  will be given a patient  to adjust the stimulation from outside of the

## 2024-02-08 NOTE — ANESTHESIA PRE PROCEDURE
Department of Anesthesiology  Preprocedure Note       Name:  Cadne Richey   Age:  63 y.o.  :  1960                                          MRN:  [id number]         Date:  2024      Surgeon: Surgeon(s):  Sabine Bradford MD    Procedure: Procedure(s):  MEDTRONIC STAGE 1 AND STAGE 2 INTERSTIM e-mailed Giovany CONFIRMED  #LBS  C-ARM    Medications prior to admission:   Prior to Admission medications    Medication Sig Start Date End Date Taking? Authorizing Provider   levothyroxine (SYNTHROID) 200 MCG tablet Take 1 tablet by mouth Daily Indications: Underactive Thyroid 24   Traci De Leon APRN - CNP   trospium (SANCTURA) 60 MG CP24 extended release capsule Take 1 capsule by mouth daily 23   Sabine Bradford MD   olmesartan (BENICAR) 40 MG tablet Take 1 tablet by mouth daily 23   Beverley Adam DO   Handicap Placard MISC by Does not apply route Exp 25 Dx: CHF, weakness, difficulty walking 23   Traci De Leon APRN - CNP   metoprolol tartrate (LOPRESSOR) 50 MG tablet Take 1 tablet by mouth 2 times daily 10/24/23   Provider, MD Arley   colchicine (COLCRYS) 0.6 MG tablet Take 1 tablet by mouth daily May take up to two tablets daily during gout flare up 23   Tony Chou DO   spironolactone (ALDACTONE) 25 MG tablet Take 1 tablet by mouth daily Indications: High Blood Pressure Disorder 10/10/23   Kirsten Aguilera APRN - CNP   omeprazole (PRILOSEC) 40 MG delayed release capsule Take 1 capsule by mouth daily 10/10/23   Kirsten Aguilera APRN - CNP   folic acid (FOLVITE) 1 MG tablet Take 1 tablet by mouth daily 10/10/23   Kirsten Aguilera APRN - CNP   acetaminophen (TYLENOL) 325 MG tablet Take 2 tablets by mouth every 6 hours as needed for Pain or Fever 23   John Gonzalez MD   apixaban (ELIQUIS) 5 MG TABS tablet Take 1 tablet by mouth 2 times daily Indications: Preventative Treatment    Provider, MD Arley   flecainide (TAMBOCOR) 50 MG tablet Take

## 2024-02-08 NOTE — OP NOTE
Interstim 1 and 2      Description: Stage I and II neuromodulator.     PREOPERATIVE DIAGNOSIS: Refractory urgency and frequency.     POSTOPERATIVE DIAGNOSIS: Refractory urgency and frequency.     OPERATION: Stage I and II neuromodulator.     ANESTHESIA: General anaesthesia.     ESTIMATED BLOOD LOSS: Minimal.     FLUIDS: Crystalloid. The patient was given Ancef preop antibiotic. Gentamicin irrigation was used throughout the procedure.     BRIEF HISTORY: Cande Richey is a 63 y.o. female who presents here today for complaints of Follow-up (PNE done 1/2/24. )    oxybutynin and mirabegron with no success. Recent sanctura was only modestly effective. Patient did mention side effects as above.   I discussed botox vs interstim   Multiple medical problems and cardiovascular . Underwent PNE last visit , here for lead removal today . Reports significant urge symptom relief > 70-80 % . Wants to proceed with stage 1 and stage 2 interstim. Diary with data consistent with patient report and scanned in epic.  The patient had less than 20% improvement with anticholinergics. Options such as continuously trying anticholinergics, continuation of the Kegel exercises, and trial of InterStim were discussed. The patient was interested in the trial. The patient had percutaneous InterStim trial in the office with over 70% to 80% improvement in her urgency, frequency, and urge incontinence. The patient was significantly satisfied with the results and wanted to proceed with stage I and II neuromodulator. Risks of anesthesia, bleeding, infection, pain, MI, DVT, and PE were discussed. Risk of failure of the procedure in the future was discussed.     Risk of lead migration that the treatment may or may not work in the long-term basis and data on the long term were not clear were discussed with the patient. The patient understood and wanted to proceed with stage I and II neuromodulator. Consent was obtained.     DETAILS OF THE OPERATION: The

## 2024-02-08 NOTE — ANESTHESIA POSTPROCEDURE EVALUATION
Department of Anesthesiology  Postprocedure Note    Patient: Cande Richey  MRN: [id number]  YOB: 1960  Date of evaluation: 2/8/2024    Procedure Summary       Date: 02/08/24 Room / Location: [address]    Anesthesia Start: 1548 Anesthesia Stop: 1712    Procedure: MEDTRONIC STAGE 1 AND STAGE 2 INTERSTIM (Back) Diagnosis:       Urge incontinence      (Urge incontinence [N39.41])    Surgeons: Sabine Bradford MD Responsible Provider: Juan Santana MD    Anesthesia Type: general ASA Status: 3            Anesthesia Type: No value filed.    Porsha Phase I: Porsha Score: 10    Porsha Phase II:      Anesthesia Post Evaluation    Patient location during evaluation: bedside  Patient participation: complete - patient participated  Level of consciousness: awake and awake and alert  Airway patency: patent  Nausea & Vomiting: no nausea and no vomiting  Cardiovascular status: blood pressure returned to baseline and hemodynamically stable  Respiratory status: acceptable  Hydration status: euvolemic  Pain management: adequate        No notable events documented.

## 2024-02-11 ENCOUNTER — PATIENT MESSAGE (OUTPATIENT)
Dept: FAMILY MEDICINE CLINIC | Age: 64
End: 2024-02-11

## 2024-02-12 NOTE — TELEPHONE ENCOUNTER
Please approve or deny request. Thank you!    Rx requested:  Requested Prescriptions     Pending Prescriptions Disp Refills    furosemide (LASIX) 20 MG tablet 30 tablet 0     Sig: Take 1 tablet by mouth daily Indications: Treatment with Diuretic Therapy         Last Office Visit:   10/10/2023      Next Visit Date:  Future Appointments   Date Time Provider Department Center   2/20/2024 11:30 AM Kirsten Aguilera, APRN - CNP [facility]   2/21/2024 11:15 AM Sabine Bradford MD [facility]   6/12/2024 12:40 PM Beverley Adam, DO [facility]

## 2024-02-12 NOTE — TELEPHONE ENCOUNTER
From: Cande Richey  To: Kirsten Aguilera  Sent: 2/11/2024 9:08 AM EST  Subject: Prescription refill request    Good morning, i need a refill for Lasix (Furosemide) 20 mg. 1 to 2 pills daily as needed for fluid retention . This is not available for refill on the [facility]. I have been on this for over 5 years. I have an appointment with you on Feb 20th so we can discuss further. If i could get a 30 day refill to hold me over until then I would appreciate it. [facility]     Thank you, Cande Richey. [phone]

## 2024-02-13 RX ORDER — FUROSEMIDE 20 MG/1
20 TABLET ORAL DAILY
Qty: 30 TABLET | Refills: 0 | Status: SHIPPED | OUTPATIENT
Start: 2024-02-13

## 2024-02-27 ENCOUNTER — OFFICE VISIT (OUTPATIENT)
Dept: OBGYN CLINIC | Age: 64
End: 2024-02-27

## 2024-02-27 VITALS
WEIGHT: 293 LBS | SYSTOLIC BLOOD PRESSURE: 128 MMHG | DIASTOLIC BLOOD PRESSURE: 76 MMHG | HEART RATE: 68 BPM | BODY MASS INDEX: 48.3 KG/M2

## 2024-02-27 DIAGNOSIS — N39.41 URGE INCONTINENCE: Primary | ICD-10-CM

## 2024-02-27 PROCEDURE — 99024 POSTOP FOLLOW-UP VISIT: CPT | Performed by: OBSTETRICS & GYNECOLOGY

## 2024-02-27 NOTE — PROGRESS NOTES
Postop Progress Note    Subjective    presents to the office for postop follow up. 2 weeks post stage 1 and stage 2 interstim. Mentions not contolling symptoms at all. Has changed settings from program 2 to 3 before leaving the hospital due to very strong sensation at very low stimulation. Currentyl at 3 at 0.9 .     Objective    Vitals:    05/26/23 0852   BP: 106/64   Pulse: 76     General: alert, cooperative and no distress  Incision: healing well  Vag exam: deferred   Buttock exam : incision healed adequately.     Assessment  Doing well postoperatively.    Plan    Changed settings to program 6 to 0.9 since patient mentions she does not feel discomfort at same stimulation.   Return in 2 weeks.   Urine sent for analysis and cx.     Sabine Bradford M.D., F.A.C.O.G

## 2024-02-28 ENCOUNTER — OFFICE VISIT (OUTPATIENT)
Dept: FAMILY MEDICINE CLINIC | Age: 64
End: 2024-02-28
Payer: COMMERCIAL

## 2024-02-28 VITALS
BODY MASS INDEX: 44.41 KG/M2 | TEMPERATURE: 97.9 F | WEIGHT: 293 LBS | HEART RATE: 71 BPM | SYSTOLIC BLOOD PRESSURE: 134 MMHG | OXYGEN SATURATION: 97 % | DIASTOLIC BLOOD PRESSURE: 70 MMHG | HEIGHT: 68 IN

## 2024-02-28 DIAGNOSIS — E03.9 HYPOTHYROIDISM, UNSPECIFIED TYPE: ICD-10-CM

## 2024-02-28 DIAGNOSIS — E78.2 MIXED HYPERLIPIDEMIA: ICD-10-CM

## 2024-02-28 DIAGNOSIS — Z13.220 SCREENING FOR LIPID DISORDERS: ICD-10-CM

## 2024-02-28 DIAGNOSIS — R53.1 DECREASED STRENGTH: ICD-10-CM

## 2024-02-28 DIAGNOSIS — N39.41 URGE INCONTINENCE: ICD-10-CM

## 2024-02-28 DIAGNOSIS — M10.9 ACUTE GOUT OF MULTIPLE SITES, UNSPECIFIED CAUSE: ICD-10-CM

## 2024-02-28 DIAGNOSIS — I10 PRIMARY HYPERTENSION: Primary | ICD-10-CM

## 2024-02-28 DIAGNOSIS — Z74.09 LIMITED MOBILITY: ICD-10-CM

## 2024-02-28 DIAGNOSIS — K21.9 GASTROESOPHAGEAL REFLUX DISEASE WITHOUT ESOPHAGITIS: ICD-10-CM

## 2024-02-28 LAB
CHOLEST SERPL-MCNC: 153 MG/DL (ref 0–199)
CLARITY UR: CLEAR
COLOR UR: YELLOW
GLUCOSE UR STRIP-MCNC: NEGATIVE MG/DL
HDLC SERPL-MCNC: 38 MG/DL (ref 40–59)
HGB UR QL STRIP: NEGATIVE
KETONES UR STRIP-MCNC: NEGATIVE MG/DL
LDLC SERPL CALC-MCNC: 85 MG/DL (ref 0–129)
LEUKOCYTE ESTERASE UR QL STRIP: NEGATIVE
NITRITE UR QL STRIP: NEGATIVE
PROT UR STRIP-MCNC: NEGATIVE MG/DL
SP GR UR STRIP: 1.02 (ref 1–1.03)
TRIGL SERPL-MCNC: 151 MG/DL (ref 0–150)
URATE SERPL-MCNC: 10.6 MG/DL (ref 2.4–5.7)
UROBILINOGEN UR STRIP-ACNC: 0.2 E.U./DL

## 2024-02-28 PROCEDURE — G8417 CALC BMI ABV UP PARAM F/U: HCPCS | Performed by: NURSE PRACTITIONER

## 2024-02-28 PROCEDURE — 3017F COLORECTAL CA SCREEN DOC REV: CPT | Performed by: NURSE PRACTITIONER

## 2024-02-28 PROCEDURE — 3078F DIAST BP <80 MM HG: CPT | Performed by: NURSE PRACTITIONER

## 2024-02-28 PROCEDURE — 99214 OFFICE O/P EST MOD 30 MIN: CPT | Performed by: NURSE PRACTITIONER

## 2024-02-28 PROCEDURE — G8484 FLU IMMUNIZE NO ADMIN: HCPCS | Performed by: NURSE PRACTITIONER

## 2024-02-28 PROCEDURE — 1036F TOBACCO NON-USER: CPT | Performed by: NURSE PRACTITIONER

## 2024-02-28 PROCEDURE — G8427 DOCREV CUR MEDS BY ELIG CLIN: HCPCS | Performed by: NURSE PRACTITIONER

## 2024-02-28 PROCEDURE — 3075F SYST BP GE 130 - 139MM HG: CPT | Performed by: NURSE PRACTITIONER

## 2024-02-28 RX ORDER — LEVOTHYROXINE SODIUM 0.2 MG/1
200 TABLET ORAL DAILY
Qty: 90 TABLET | Refills: 2 | Status: SHIPPED | OUTPATIENT
Start: 2024-02-28

## 2024-02-28 ASSESSMENT — ENCOUNTER SYMPTOMS
WHEEZING: 0
RESPIRATORY NEGATIVE: 1
BLOOD IN STOOL: 0
SHORTNESS OF BREATH: 0
COUGH: 0

## 2024-02-28 ASSESSMENT — PATIENT HEALTH QUESTIONNAIRE - PHQ9: DEPRESSION UNABLE TO ASSESS: PT REFUSES

## 2024-02-28 NOTE — PROGRESS NOTES
[facility]  [facility]  [address]  [place] [address]  Dept: [phone]  Dept Fax: [phone]  Loc: [phone]     SUBJECTIVE    Cande Richey (: 1960) is a 63 y.o. female, Established patient, here for evaluation of the following chief complaint(s):  Follow-up and Gout (Having gout flare up for the last 2 weeks, medication that was Rx does not help help/Has been taking Black cherry juice and it has helped her some. )      PCP:  Kirsten Aguilera, APRN - CNP      HPI    Patient is here for follow up on hypertension.  How often are you checking your blood pressure? prn  What are your average readings?  Systolic 113-170. Typically within normal limits  Are you compliant with your diet? Yes  Do you exercise? No  Are you compliant with your medications? Yes  Are you having difficulty affording your medications? Yes  Do you have side effects from the medication? No  Do you have any of the following symptoms?  Chest Pain? No  Palpitations? No  PARDO/SOB? No  Headache? No  Peripheral Edema? No  Light Headedness? No    Last labs:  Lab Results   Component Value Date/Time     2023 06:10 AM    K 4.4 2023 06:10 AM    K 3.9 2023 05:30 AM     2023 06:10 AM    CO2 25 2023 06:10 AM    BUN 29 2023 06:10 AM    CREATININE 1.00 2023 06:10 AM    GLUCOSE 72 2023 06:10 AM    CALCIUM 9.7 2023 06:10 AM       No results found for: \"CHOL\"  No results found for: \"TRIG\"  No results found for: \"HDL\"  No results found for: \"LDLCHOLESTEROL\", \"LDLCALC\"  No results found for: \"VLDL\"  No results found for: \"CHOLHDLRATIO\"     Patient is here for follow up on hyperlipidemia:    Are you compliant with your medications? yes  Are you having difficulty affording your medications? no  Do you have side effects from the medication? no  Are you compliant with your diet?

## 2024-02-29 DIAGNOSIS — M10.9 ACUTE GOUT OF MULTIPLE SITES, UNSPECIFIED CAUSE: Primary | ICD-10-CM

## 2024-02-29 DIAGNOSIS — N63.31 MASS OF AXILLARY TAIL OF RIGHT BREAST: Primary | ICD-10-CM

## 2024-02-29 RX ORDER — FEBUXOSTAT 40 MG/1
40 TABLET, FILM COATED ORAL DAILY
Qty: 30 TABLET | Refills: 2 | Status: SHIPPED | OUTPATIENT
Start: 2024-02-29

## 2024-03-01 LAB
BACTERIA UR CULT: ABNORMAL
BACTERIA UR CULT: ABNORMAL
ORGANISM: ABNORMAL

## 2024-03-01 RX ORDER — NITROFURANTOIN 25; 75 MG/1; MG/1
100 CAPSULE ORAL 2 TIMES DAILY
Qty: 10 CAPSULE | Refills: 0 | Status: SHIPPED | OUTPATIENT
Start: 2024-03-01 | End: 2024-03-06

## 2024-03-06 ENCOUNTER — HOSPITAL ENCOUNTER (OUTPATIENT)
Dept: PHYSICAL THERAPY | Age: 64
Setting detail: THERAPIES SERIES
Discharge: HOME | End: 2024-03-06
Payer: COMMERCIAL

## 2024-03-06 PROCEDURE — 97162 PT EVAL MOD COMPLEX 30 MIN: CPT

## 2024-03-06 NOTE — THERAPY EVALUATION
[facility]  PHYSICAL THERAPY PLAN OF CARE                                    [facility] [place] [address]     Ph: [phone]  Fax: [phone]    [] Certification  [] Recertification [x]  Plan of Care  [] Progress Note [] Discharge      Referring Provider: Kirsten Aguilera APRN - CNP    From:  Jesica Doe, PT, DPT    Patient: Cande Richey (63 y.o. female) : 1960 Date: 2024   Medical Diagnosis: Limited mobility [Z74.09]  Decreased strength [R53.1]    Treatment Diagnosis: Decreased ambulation and stair negotiation, reduced strength, decreased balance, reduced transfers and bed mobility    Plan of Care/Certification Expiration Date:     Progress Report Period from: 3/6/2024  to 3/6/2024    Visits to Date: 1 No Show: 0 Cancelled Appts: 0    OBJECTIVE:   Short Term Goals - Time Frame for Short Term Goals: 2-3 weeks    Goals Current/Discharge status  Status   Short Term Goal 1: Patient will improve R knee ext by >/=10 deg to improve TKE and overall body mechanics during ambulation.  AROM LLE (degrees)  L Knee Flexion (0-145): WFL  L Knee Extension (0): 0   AROM RLE (degrees)  R Knee Flexion (0-145): WFL  R Knee Extension (0): -25 deg   General PROM LE: Right WFL, Left WFL New   Short Term Goal 2: Patient will complete independent SLR on R LE to demonstrate improvement in bed mobility and car transfers.  Unable to complete SLR New     Long Term Goals - Time Frame for Long Term Goals : 5 weeks  Goals Current/ Discharge status Status   Long Term Goal 1: Patient will improve bilateral hip strength to >/=4/5 to improve standing and walking tolerance. Strength LLE  L Hip Flexion: 4-/5  L Hip Extension: 3-/5, At least  L Hip ABduction: 3-/5  L Knee Flexion: 4/5  L Knee Extension: 4/5  L Ankle Dorsiflexion: 5/5  Strength RLE  R Hip Flexion: 2+/5  R Hip Extension: 3-/5, At least  R Hip ABduction: 2+/5  R Knee Flexion: 4/5  R Knee Extension: 4/5  R Ankle Dorsiflexion: 5/5  New   Long Term

## 2024-03-06 NOTE — PROGRESS NOTES
management, Home exercise program, Safety education & training, Patient/Caregiver education & training, Equipment evaluation, education, & procurement, Modalities, Group Therapy     Frequency / Duration:  Patient to be seen 2x times per week for 5 weeks  Plan Comment:               Eval Complexity:   Decision Making: Medium Complexity  History: Personal Factors and/or Comorbidities Impacting POC: High  History: metal implants, arthritis, acid reflux, thyroid disease, circulatory problems, HTN, B/B control  Examination of body system(s) including body structures and functions, activity limitations, and/or participation restrictions: High  Exam: 5xSTS: 56.20 sec ; TU.95 sec  Clinical Presentation: Medium  Clinical Presentation: Evolving    POST-PAIN     Pain Rating (0-10 pain scale):   no change/10  Location and pain description same as pre-treatment unless indicated.   Action: [] NA  [] Call Physician  [x] Perform HEP  [] Meds as prescribed    Evaluation and patient rights have been reviewed and patient agrees with plan of care.  Yes  [x]  No  []   Explain:     Kandi Fall Risk Assessment  Risk Factor Scale  Score   History of Falls [x] Yes  [] No 25  0 25   Secondary Diagnosis [] Yes  [x] No 15  0 0   Ambulatory Aid [] Furniture  [x] Crutches/cane/walker  [] None/bedrest/wheelchair/nurse 30  15  0 15   IV/Heparin Lock [] Yes  [x] No 20  0 0   Gait/Transferring [] Impaired  [x] Weak  [] Normal/bedrest/immobile 20  10  0 10   Mental Status [] Forgets limitations  [x] Oriented to own ability 15  0 0      Total: 50     Based on the Assessment score: check the appropriate box.  []  No intervention needed   Low =   Score of 0-24  []  Use standard prevention interventions Moderate =  Score of 24-44   [] Discuss fall prevention strategies   [] Indicate moderate falls risk on eval  [x]  Use high risk prevention interventions High = Score of 45 and higher   [x] Discuss fall prevention strategies   [x] Provide supervision

## 2024-03-08 ENCOUNTER — HOSPITAL ENCOUNTER (OUTPATIENT)
Dept: PHYSICAL THERAPY | Age: 64
Setting detail: THERAPIES SERIES
Discharge: HOME | End: 2024-03-08
Payer: COMMERCIAL

## 2024-03-08 NOTE — PROGRESS NOTES
Therapy                            Cancellation/No-show Note    Date: 2024  Patient: Cande Richey (63 y.o. female)  : 1960  MRN:  [id number]  Referring Physician: Kirsten Aguilera APRN - CNP    Medical Diagnosis: Limited mobility [Z74.09]  Decreased strength [R53.1]      Visit Information:  Insurance: Payor: [employer] / Plan: [employer] - CHOICE PLU / Product Type: *No Product type* /   Visits to Date: 1   No Show/Cancelled Appts: 0       For today's appointment patient:  [x]  Cancelled  []  Rescheduled appointment  []  No-show   []  Called pt to remind of next appointment     Reason given by patient:  []  Patient ill  []  Conflicting appointment  []  No transportation    []  Conflict with work  []  No reason given  [x]  Other:  \"having flare-up\"    [x] Pt has future appointments scheduled, no follow up needed  [] Pt requests to be on hold.    Reason:   If > 2 weeks please discuss with therapist.  [] Therapist to call pt for follow up     Comments:       Signature: Electronically signed by Jesica Doe PT on 3/8/24 at 9:04 AM EST

## 2024-03-11 ENCOUNTER — HOSPITAL ENCOUNTER (OUTPATIENT)
Dept: PHYSICAL THERAPY | Age: 64
Setting detail: THERAPIES SERIES
Discharge: HOME | End: 2024-03-11
Payer: COMMERCIAL

## 2024-03-11 NOTE — PROGRESS NOTES
Therapy                            Cancellation/No-show Note    Date: 2024  Patient: Cande Richey (63 y.o. female)  : 1960  MRN:  [id number]  Referring Physician: Kirsten Aguilera APRN - CNP    Medical Diagnosis: Limited mobility [Z74.09]  Decreased strength [R53.1]      Visit Information:  Insurance: Payor: [employer] / Plan: [employer] - CHOICE PLU / Product Type: *No Product type* /   Visits to Date: 1   No Show/Cancelled Appts: 0 / 2      For today's appointment patient:  [x]  Cancelled  []  Rescheduled appointment  []  No-show   []  Called pt to remind of next appointment     Reason given by patient:  []  Patient ill  []  Conflicting appointment  []  No transportation    []  Conflict with work  []  No reason given  [x]  Other:  Gout Flare up     [x] Pt has future appointments scheduled, no follow up needed  [] Pt requests to be on hold.    Reason:   If > 2 weeks please discuss with therapist.  [] Therapist to call pt for follow up     Comments:       Signature: Electronically signed by Bertha Ruth PTA on 3/11/24 at 8:28 AM EDT

## 2024-03-12 DIAGNOSIS — M10.9 ACUTE GOUT OF MULTIPLE SITES, UNSPECIFIED CAUSE: Primary | ICD-10-CM

## 2024-03-12 RX ORDER — PREDNISONE 20 MG/1
TABLET ORAL
Qty: 15 TABLET | Refills: 0 | Status: SHIPPED | OUTPATIENT
Start: 2024-03-12 | End: 2024-03-22

## 2024-03-14 ENCOUNTER — HOSPITAL ENCOUNTER (OUTPATIENT)
Dept: WOMENS IMAGING | Age: 64
Discharge: HOME | End: 2024-03-16
Attending: OBSTETRICS & GYNECOLOGY
Payer: COMMERCIAL

## 2024-03-14 ENCOUNTER — APPOINTMENT (OUTPATIENT)
Dept: PHYSICAL THERAPY | Age: 64
End: 2024-03-14
Payer: COMMERCIAL

## 2024-03-14 ENCOUNTER — HOSPITAL ENCOUNTER (OUTPATIENT)
Dept: ULTRASOUND IMAGING | Age: 64
Discharge: HOME | End: 2024-03-16
Attending: OBSTETRICS & GYNECOLOGY
Payer: COMMERCIAL

## 2024-03-14 DIAGNOSIS — R92.8 ABNORMAL MAMMOGRAM: ICD-10-CM

## 2024-03-14 PROCEDURE — 76642 ULTRASOUND BREAST LIMITED: CPT

## 2024-03-14 PROCEDURE — G0279 TOMOSYNTHESIS, MAMMO: HCPCS

## 2024-03-18 ENCOUNTER — HOSPITAL ENCOUNTER (OUTPATIENT)
Dept: PHYSICAL THERAPY | Age: 64
Setting detail: THERAPIES SERIES
Discharge: HOME | End: 2024-03-18
Payer: COMMERCIAL

## 2024-03-18 PROCEDURE — 97110 THERAPEUTIC EXERCISES: CPT

## 2024-03-18 ASSESSMENT — PAIN DESCRIPTION - ORIENTATION: ORIENTATION: RIGHT

## 2024-03-18 ASSESSMENT — PAIN DESCRIPTION - DESCRIPTORS: DESCRIPTORS: ACHING;SORE

## 2024-03-18 ASSESSMENT — PAIN SCALES - GENERAL: PAINLEVEL_OUTOF10: 6

## 2024-03-18 NOTE — PROGRESS NOTES
[facility]  Outpatient Physical Therapy    Treatment Note        Date: 3/18/2024  Patient: Cande Richey  : 1960   Confirmed: Yes  MRN: [id number]  Referring Provider: Kirsten Aguilera APRN - CNP    Medical Diagnosis: Limited mobility [Z74.09]  Decreased strength [R53.1]       Treatment Diagnosis: Decreased ambulation and stair negotiation, reduced strength, decreased balance, reduced transfers and bed mobility    Visit Information:  Insurance: Payor: [employer] / Plan: [employer] PLU / Product Type: *No Product type* /   PT Visit Information  Onset Date:  ()  PT Insurance Information: [employer]  Total # of Visits Approved: 20  Total # of Visits to Date: 1  No Show: 0  Canceled Appointment: 2  Progress Note Counter: 2/10    Subjective Information:  Subjective: Patient reports having her kitchen remodeled in which she did not have help to clean it out, increased LBP today. Recently had a Gout flare up, is in the middle of a Steroid pack which seems to be helping. Patient arriving 10min late to session, unable to accommodate full treatment time.  HEP Compliance:  [] Good [] Fair [x] Poor [x] Reports not doing due to: House reconstruction               Pain Screening  Patient Currently in Pain: Yes  Pain Assessment: 0-10  Pain Level: 6  Pain Location: Back  Pain Orientation: Right  Pain Descriptors: Aching, Sore    Treatment:  Exercises:  Exercises  Exercise 2: STS from elevated mat x10 without UE use  Exercise 3: QS on R 5'' x10  Exercise 4: Low Bridges 3'' x10  Exercise 7: Gait in // bars: Marching x1 lap with UE support  Exercise 8: H/L hip: Adduction with Pball, abduction YTB , marching x10 ea  Exercise 9: SLR x10 on Lt, 2x 5 reps on Rt  Exercise 10: Scifit level 1 for 4.5mins- decreased tolerance  Exercise 13: Pball DKTC 3'' x8  Exercise 20: HEP: Increase compliance, + STS and QS           *Indicates exercise, modality, or manual techniques to be initiated

## 2024-03-19 ENCOUNTER — OFFICE VISIT (OUTPATIENT)
Dept: OBGYN CLINIC | Age: 64
End: 2024-03-19
Payer: COMMERCIAL

## 2024-03-19 VITALS
SYSTOLIC BLOOD PRESSURE: 126 MMHG | HEART RATE: 53 BPM | BODY MASS INDEX: 49.84 KG/M2 | WEIGHT: 293 LBS | DIASTOLIC BLOOD PRESSURE: 76 MMHG

## 2024-03-19 DIAGNOSIS — Z87.440 RECENT URINARY TRACT INFECTION: Primary | ICD-10-CM

## 2024-03-19 DIAGNOSIS — Z87.440 RECENT URINARY TRACT INFECTION: ICD-10-CM

## 2024-03-19 LAB
BILIRUBIN, POC: ABNORMAL
BLOOD URINE, POC: ABNORMAL
CLARITY, POC: ABNORMAL
COLOR, POC: ABNORMAL
GLUCOSE URINE, POC: NEGATIVE
KETONES, POC: POSITIVE
LEUKOCYTE EST, POC: ABNORMAL
NITRITE, POC: NEGATIVE
PH, POC: 5.5
PROTEIN, POC: ABNORMAL
SPECIFIC GRAVITY, POC: 1.03
UROBILINOGEN, POC: NEGATIVE

## 2024-03-19 PROCEDURE — 81003 URINALYSIS AUTO W/O SCOPE: CPT | Performed by: OBSTETRICS & GYNECOLOGY

## 2024-03-19 PROCEDURE — 99024 POSTOP FOLLOW-UP VISIT: CPT | Performed by: OBSTETRICS & GYNECOLOGY

## 2024-03-20 LAB — BACTERIA UR CULT: NORMAL

## 2024-03-21 ENCOUNTER — HOSPITAL ENCOUNTER (OUTPATIENT)
Dept: PHYSICAL THERAPY | Age: 64
Setting detail: THERAPIES SERIES
Discharge: HOME | End: 2024-03-21
Payer: COMMERCIAL

## 2024-03-21 NOTE — PROGRESS NOTES
Therapy                            Cancellation/No-show Note    Date: 2024  Patient: Cande Richey (63 y.o. female)  : 1960  MRN:  [id number]  Referring Physician: Kirsten Aguilera APRN - CNP    Medical Diagnosis: Limited mobility [Z74.09]  Decreased strength [R53.1]      Visit Information:  Insurance: Payor: [employer] / Plan: [employer] - CHOICE PLU / Product Type: *No Product type* /   Visits to Date:     No Show/Cancelled Appts:   /        For today's appointment patient:  [x]  Cancelled  []  Rescheduled appointment  []  No-show   []  Called pt to remind of next appointment     Reason given by patient:  []  Patient ill  []  Conflicting appointment  []  No transportation    []  Conflict with work  []  No reason given  [x]  Other:  gout flare up    [] Pt has future appointments scheduled, no follow up needed  [] Pt requests to be on hold.    Reason:   If > 2 weeks please discuss with therapist.  [] Therapist to call pt for follow up     Comments:       Signature: Electronically signed by Leonila Grant PTA on 3/21/24 at 8:52 AM EDT

## 2024-03-24 DIAGNOSIS — Z12.4 SCREENING FOR CERVICAL CANCER: ICD-10-CM

## 2024-03-24 DIAGNOSIS — I89.0 LYMPHEDEMA: ICD-10-CM

## 2024-03-25 ENCOUNTER — HOSPITAL ENCOUNTER (OUTPATIENT)
Dept: PHYSICAL THERAPY | Age: 64
Setting detail: THERAPIES SERIES
Discharge: HOME | End: 2024-03-25
Payer: COMMERCIAL

## 2024-03-25 RX ORDER — FUROSEMIDE 20 MG/1
20 TABLET ORAL DAILY
Qty: 30 TABLET | Refills: 0 | OUTPATIENT
Start: 2024-03-25

## 2024-03-25 NOTE — PROGRESS NOTES
Therapy                            Cancellation/No-show Note    Date: 2024  Patient: Cande Richey (63 y.o. female)  : 1960  MRN:  [id number]  Referring Physician: Kirsten Aguilera APRN - CNP    Medical Diagnosis: Limited mobility [Z74.09]  Decreased strength [R53.1]      Visit Information:  Insurance: Payor: [employer] / Plan: [employer] - CHOICE PLU / Product Type: *No Product type* /   Visits to Date: 1   No Show/Cancelled Appts: 0 / 3      For today's appointment patient:  [x]  Cancelled  []  Rescheduled appointment  []  No-show   []  Called pt to remind of next appointment     Reason given by patient:  []  Patient ill  []  Conflicting appointment  []  No transportation    []  Conflict with work  []  No reason given  [x]  Other:      [] Pt has future appointments scheduled, no follow up needed  [x] Pt requests to be on hold.    Reason:   If > 2 weeks please discuss with therapist.  [] Therapist to call pt for follow up     Comments:   patient called requesting hold due to gout flare-up and difficulty walking x5 days. Told  that she is scheduling virtual visit with PCP. Patient informed she needs to contact by  or will be discharged.     Signature: Electronically signed by Jesica Doe PT on 3/25/24 at 1:11 PM EDT

## 2024-03-25 NOTE — TELEPHONE ENCOUNTER
Please approve or deny request. Thank you!    Rx requested:  Requested Prescriptions     Pending Prescriptions Disp Refills    furosemide (LASIX) 20 MG tablet [Pharmacy Med Name: furosemide 20 mg tablet] 30 tablet 0     Sig: TAKE 1 TABLET BY MOUTH DAILY    spironolactone (ALDACTONE) 25 MG tablet [Pharmacy Med Name: spironolactone 25 mg tablet] 90 tablet 0     Sig: TAKE 1 TABLET BY MOUTH DAILY    folic acid (FOLVITE) 1 MG tablet [Pharmacy Med Name: folic acid 1 mg tablet] 90 tablet 0     Sig: TAKE 1 TABLET BY MOUTH DAILY         Last Office Visit:   2/28/2024      Next Visit Date:  Future Appointments   Date Time Provider Department Center   3/27/2024  7:45 AM Kirsten Aguilera, APRN - CNP [facility]   4/1/2024  2:45 PM Sabine Bradford MD [facility]   4/10/2024  7:30 AM Teresa Whitmore MD [facility]   6/12/2024 12:40 PM Beverley Adam DO [facility]   8/14/2024  8:00 AM Kirsten Aguilera APRN - CNP [facility]

## 2024-03-26 RX ORDER — SPIRONOLACTONE 25 MG/1
25 TABLET ORAL DAILY
Qty: 90 TABLET | Refills: 0 | Status: SHIPPED | OUTPATIENT
Start: 2024-03-26

## 2024-03-26 RX ORDER — FOLIC ACID 1 MG/1
1 TABLET ORAL DAILY
Qty: 90 TABLET | Refills: 0 | Status: SHIPPED | OUTPATIENT
Start: 2024-03-26

## 2024-03-26 RX ORDER — FUROSEMIDE 20 MG/1
20 TABLET ORAL DAILY
Qty: 90 TABLET | Refills: 0 | Status: SHIPPED | OUTPATIENT
Start: 2024-03-26

## 2024-03-27 ENCOUNTER — TELEMEDICINE (OUTPATIENT)
Dept: FAMILY MEDICINE CLINIC | Age: 64
End: 2024-03-27
Payer: COMMERCIAL

## 2024-03-27 DIAGNOSIS — M10.9 ACUTE GOUT OF MULTIPLE SITES, UNSPECIFIED CAUSE: Primary | ICD-10-CM

## 2024-03-27 PROCEDURE — 1036F TOBACCO NON-USER: CPT | Performed by: NURSE PRACTITIONER

## 2024-03-27 PROCEDURE — 3017F COLORECTAL CA SCREEN DOC REV: CPT | Performed by: NURSE PRACTITIONER

## 2024-03-27 PROCEDURE — 99213 OFFICE O/P EST LOW 20 MIN: CPT | Performed by: NURSE PRACTITIONER

## 2024-03-27 PROCEDURE — G8484 FLU IMMUNIZE NO ADMIN: HCPCS | Performed by: NURSE PRACTITIONER

## 2024-03-27 PROCEDURE — G8417 CALC BMI ABV UP PARAM F/U: HCPCS | Performed by: NURSE PRACTITIONER

## 2024-03-27 PROCEDURE — G8427 DOCREV CUR MEDS BY ELIG CLIN: HCPCS | Performed by: NURSE PRACTITIONER

## 2024-03-27 RX ORDER — FEBUXOSTAT 80 MG/1
80 TABLET, FILM COATED ORAL DAILY
Qty: 30 TABLET | Refills: 2 | Status: SHIPPED | OUTPATIENT
Start: 2024-03-27

## 2024-03-27 RX ORDER — PREDNISONE 20 MG/1
20 TABLET ORAL 2 TIMES DAILY
Qty: 10 TABLET | Refills: 0 | Status: SHIPPED | OUTPATIENT
Start: 2024-03-27 | End: 2024-04-01

## 2024-03-27 ASSESSMENT — PATIENT HEALTH QUESTIONNAIRE - PHQ9: DEPRESSION UNABLE TO ASSESS: PT REFUSES

## 2024-03-27 ASSESSMENT — ENCOUNTER SYMPTOMS
BLOOD IN STOOL: 0
WHEEZING: 0
RESPIRATORY NEGATIVE: 1
SHORTNESS OF BREATH: 0
COUGH: 0

## 2024-03-27 NOTE — PROGRESS NOTES
3/27/2024    TELEHEALTH EVALUATION -- Audio/Visual    HPI:    Cande Richey (:  1960) has requested an audio/video evaluation for the following concern(s):    Gout:    Having issues with gout. Ongoing issue. Patient is in a lot of pain last uric acid in February was 10.6. Is allergic to allopurinol. Colcyrs was unsuccessful. 24 was started on uloric.     Review of Systems   Constitutional: Negative.  Negative for fatigue and fever.   Respiratory: Negative.  Negative for cough, shortness of breath and wheezing.    Cardiovascular: Negative.  Negative for chest pain, palpitations and leg swelling.   Gastrointestinal:  Negative for blood in stool.   Psychiatric/Behavioral: Negative.  Negative for behavioral problems. The patient is not nervous/anxious.        Prior to Visit Medications    Medication Sig Taking? Authorizing Provider   febuxostat (ULORIC) 80 MG TABS tablet Take 1 tablet by mouth daily Yes Kirsten Aguilera APRN - CNP   predniSONE (DELTASONE) 20 MG tablet Take 1 tablet by mouth 2 times daily for 5 days Yes Kirsten Aguilera APRN - CNP   furosemide (LASIX) 20 MG tablet TAKE 1 TABLET BY MOUTH DAILY Yes Kirsten Aguilera, APRN - CNP   spironolactone (ALDACTONE) 25 MG tablet TAKE 1 TABLET BY MOUTH DAILY Yes Kirsten Aguilera APRN - CNP   folic acid (FOLVITE) 1 MG tablet TAKE 1 TABLET BY MOUTH DAILY Yes Kirsten Aguilera APRN - CNP   levothyroxine (SYNTHROID) 200 MCG tablet Take 1 tablet by mouth Daily Indications: Underactive Thyroid Yes Kirsten Aguilera APRN - CNP   acetaminophen (TYLENOL) 500 MG tablet Take 2 tablets by mouth every 6 hours as needed for Pain Yes Sabine Bradford MD   olmesartan (BENICAR) 40 MG tablet Take 1 tablet by mouth daily Yes Beverley Adam, DO   Handicap [facility] MISC by Does not apply route Exp 25 Dx: CHF, weakness, difficulty walking Yes Traci De Leon APRN - CNP   metoprolol tartrate (LOPRESSOR) 50 MG tablet Take 1 tablet by mouth 2 times daily Yes

## 2024-03-28 ENCOUNTER — APPOINTMENT (OUTPATIENT)
Dept: PHYSICAL THERAPY | Age: 64
End: 2024-03-28
Payer: COMMERCIAL

## 2024-04-01 ENCOUNTER — OFFICE VISIT (OUTPATIENT)
Dept: OBGYN CLINIC | Age: 64
End: 2024-04-01
Payer: COMMERCIAL

## 2024-04-01 VITALS — BODY MASS INDEX: 49.84 KG/M2 | DIASTOLIC BLOOD PRESSURE: 84 MMHG | SYSTOLIC BLOOD PRESSURE: 110 MMHG | HEIGHT: 68 IN

## 2024-04-01 DIAGNOSIS — N39.41 URGE INCONTINENCE: ICD-10-CM

## 2024-04-01 DIAGNOSIS — N39.41 URGE INCONTINENCE: Primary | ICD-10-CM

## 2024-04-01 LAB
BILIRUB UR QL STRIP: NEGATIVE
CLARITY UR: CLEAR
COLOR UR: YELLOW
GLUCOSE UR STRIP-MCNC: NEGATIVE MG/DL
HGB UR QL STRIP: NEGATIVE
KETONES UR STRIP-MCNC: NEGATIVE MG/DL
LEUKOCYTE ESTERASE UR QL STRIP: NEGATIVE
NITRITE UR QL STRIP: NEGATIVE
PH UR STRIP: 5.5 [PH] (ref 5–9)
PROT UR STRIP-MCNC: NEGATIVE MG/DL
SP GR UR STRIP: 1.01 (ref 1–1.03)
UROBILINOGEN UR STRIP-ACNC: 0.2 E.U./DL

## 2024-04-01 PROCEDURE — 3017F COLORECTAL CA SCREEN DOC REV: CPT | Performed by: OBSTETRICS & GYNECOLOGY

## 2024-04-01 PROCEDURE — 3079F DIAST BP 80-89 MM HG: CPT | Performed by: OBSTETRICS & GYNECOLOGY

## 2024-04-01 PROCEDURE — 3074F SYST BP LT 130 MM HG: CPT | Performed by: OBSTETRICS & GYNECOLOGY

## 2024-04-01 PROCEDURE — G8417 CALC BMI ABV UP PARAM F/U: HCPCS | Performed by: OBSTETRICS & GYNECOLOGY

## 2024-04-01 PROCEDURE — G8427 DOCREV CUR MEDS BY ELIG CLIN: HCPCS | Performed by: OBSTETRICS & GYNECOLOGY

## 2024-04-01 PROCEDURE — 99213 OFFICE O/P EST LOW 20 MIN: CPT | Performed by: OBSTETRICS & GYNECOLOGY

## 2024-04-01 PROCEDURE — 1036F TOBACCO NON-USER: CPT | Performed by: OBSTETRICS & GYNECOLOGY

## 2024-04-01 RX ORDER — FESOTERODINE FUMARATE 8 MG/1
1 TABLET, EXTENDED RELEASE ORAL DAILY
Qty: 15 TABLET | Refills: 0 | Status: SHIPPED | OUTPATIENT
Start: 2024-04-01

## 2024-04-01 NOTE — PROGRESS NOTES
Postop Progress Note    Subjective    presents to the office for postop follow up. 2 weeks post stage 1 and stage 2 interstim. Mentions not contolling symptoms at all. Has changed settings from program 2 to 3 before leaving the hospital due to very strong sensation at very low stimulation. Currentyl at 3 at 0.9 .     Objective    Vitals:    05/26/23 0852   BP: 106/64   Pulse: 76     General: alert, cooperative and no distress  Incision: healing well  Vag exam: deferred   Buttock exam : incision healed adequately.     Assessment  Doing well postoperatively.    Plan  Changed to 4 at 1.1 .   Changed settings to program 6 to 0.9 since patient mentions she does not feel discomfort at same stimulation.   Return in 2 weeks.   Urine sent for analysis and cx.     Sabine Bradford M.D., F.A.C.O.G

## 2024-04-03 LAB — BACTERIA UR CULT: NORMAL

## 2024-04-17 ENCOUNTER — HOSPITAL ENCOUNTER (OUTPATIENT)
Dept: PHYSICAL THERAPY | Age: 64
Setting detail: THERAPIES SERIES
Discharge: HOME | End: 2024-04-17

## 2024-04-17 NOTE — PROGRESS NOTES
Therapy                            Cancellation/No-show Note    Date: 2024  Patient: Cande Richey (63 y.o. female)  : 1960  MRN:  [id number]  Referring Physician: Kirsten Aguilera APRN - CNP    Medical Diagnosis: Limited mobility [Z74.09]  Decreased strength [R53.1]      Visit Information:  Insurance: Payor: [employer] / Plan: [employer] - CHOICE PLU / Product Type: *No Product type* /   Visits to Date: 1   No Show/Cancelled Appts: 0 / 4      For today's appointment patient:  [x]  Cancelled  []  Rescheduled appointment  []  No-show   []  Called pt to remind of next appointment     Reason given by patient:  []  Patient ill  []  Conflicting appointment  [x]  No transportation    []  Conflict with work  []  No reason given  []  Other:      [x] Pt has future appointments scheduled, no follow up needed  [] Pt requests to be on hold.    Reason:   If > 2 weeks please discuss with therapist.  [] Therapist to call pt for follow up     Comments:       Signature: Electronically signed by Leonila Grant PTA on 24 at 2:37 PM EDT

## 2024-04-19 ENCOUNTER — HOSPITAL ENCOUNTER (OUTPATIENT)
Dept: PHYSICAL THERAPY | Age: 64
Setting detail: THERAPIES SERIES
Discharge: HOME | End: 2024-04-19

## 2024-04-19 NOTE — PROGRESS NOTES
Therapy                            Cancellation/No-show Note    Date: 2024  Patient: Cande Richey (63 y.o. female)  : 1960  MRN:  [id number]  Referring Physician: Kirsten Aguilera APRN - CNP    Medical Diagnosis: Limited mobility [Z74.09]  Decreased strength [R53.1]      Visit Information:  Insurance: Payor: [employer] / Plan: [employer] - CHOICE PLU / Product Type: *No Product type* /   Visits to Date: 1   No Show/Cancelled Appts: 0 / 5      For today's appointment patient:  [x]  Cancelled  []  Rescheduled appointment  []  No-show   []  Called pt to remind of next appointment     Reason given by patient:  []  Patient ill  []  Conflicting appointment  []  No transportation    []  Conflict with work  []  No reason given  [x]  Other:  Gout, plan D/C    [] Pt has future appointments scheduled, no follow up needed  [] Pt requests to be on hold.    Reason:   If > 2 weeks please discuss with therapist.  [] Therapist to call pt for follow up     Comments:       Signature: Electronically signed by Rosanne Haynes PTA on 24 at 8:50 AM EDT

## 2024-04-22 ENCOUNTER — OFFICE VISIT (OUTPATIENT)
Dept: OBGYN CLINIC | Age: 64
End: 2024-04-22
Payer: COMMERCIAL

## 2024-04-22 VITALS
WEIGHT: 293 LBS | HEIGHT: 68 IN | HEART RATE: 54 BPM | BODY MASS INDEX: 44.41 KG/M2 | SYSTOLIC BLOOD PRESSURE: 122 MMHG | DIASTOLIC BLOOD PRESSURE: 78 MMHG

## 2024-04-22 DIAGNOSIS — M10.9 GOUT INVOLVING TOE, UNSPECIFIED CAUSE, UNSPECIFIED CHRONICITY, UNSPECIFIED LATERALITY: ICD-10-CM

## 2024-04-22 DIAGNOSIS — R30.0 DYSURIA: ICD-10-CM

## 2024-04-22 DIAGNOSIS — N39.41 URGE INCONTINENCE: Primary | ICD-10-CM

## 2024-04-22 LAB
BACTERIA URNS QL MICRO: ABNORMAL /HPF
BILIRUB UR QL STRIP: NEGATIVE
CLARITY UR: ABNORMAL
COLOR UR: YELLOW
EPI CELLS #/AREA URNS AUTO: ABNORMAL /HPF (ref 0–5)
GLUCOSE UR STRIP-MCNC: NEGATIVE MG/DL
HGB UR QL STRIP: ABNORMAL
HYALINE CASTS #/AREA URNS AUTO: ABNORMAL /HPF (ref 0–5)
KETONES UR STRIP-MCNC: NEGATIVE MG/DL
LEUKOCYTE ESTERASE UR QL STRIP: ABNORMAL
NITRITE UR QL STRIP: NEGATIVE
PH UR STRIP: 5.5 [PH] (ref 5–9)
PROT UR STRIP-MCNC: 30 MG/DL
RBC #/AREA URNS AUTO: >100 /HPF (ref 0–5)
SP GR UR STRIP: 1.02 (ref 1–1.03)
UROBILINOGEN UR STRIP-ACNC: 0.2 E.U./DL
WBC #/AREA URNS AUTO: >100 /HPF (ref 0–5)

## 2024-04-22 PROCEDURE — G8427 DOCREV CUR MEDS BY ELIG CLIN: HCPCS | Performed by: OBSTETRICS & GYNECOLOGY

## 2024-04-22 PROCEDURE — 3074F SYST BP LT 130 MM HG: CPT | Performed by: OBSTETRICS & GYNECOLOGY

## 2024-04-22 PROCEDURE — G8417 CALC BMI ABV UP PARAM F/U: HCPCS | Performed by: OBSTETRICS & GYNECOLOGY

## 2024-04-22 PROCEDURE — 1036F TOBACCO NON-USER: CPT | Performed by: OBSTETRICS & GYNECOLOGY

## 2024-04-22 PROCEDURE — 3017F COLORECTAL CA SCREEN DOC REV: CPT | Performed by: OBSTETRICS & GYNECOLOGY

## 2024-04-22 PROCEDURE — 99213 OFFICE O/P EST LOW 20 MIN: CPT | Performed by: OBSTETRICS & GYNECOLOGY

## 2024-04-22 PROCEDURE — 3078F DIAST BP <80 MM HG: CPT | Performed by: OBSTETRICS & GYNECOLOGY

## 2024-04-22 ASSESSMENT — PATIENT HEALTH QUESTIONNAIRE - PHQ9
2. FEELING DOWN, DEPRESSED OR HOPELESS: NOT AT ALL
SUM OF ALL RESPONSES TO PHQ QUESTIONS 1-9: 0
SUM OF ALL RESPONSES TO PHQ QUESTIONS 1-9: 0
SUM OF ALL RESPONSES TO PHQ9 QUESTIONS 1 & 2: 0
SUM OF ALL RESPONSES TO PHQ QUESTIONS 1-9: 0
SUM OF ALL RESPONSES TO PHQ QUESTIONS 1-9: 0
1. LITTLE INTEREST OR PLEASURE IN DOING THINGS: NOT AT ALL

## 2024-04-23 LAB
HOURS COLLECTED: NORMAL HOURS
URIC ACID, UR: NORMAL MG/DL
VOLUME: NORMAL ML

## 2024-04-23 RX ORDER — FESOTERODINE FUMARATE 8 MG/1
1 TABLET, EXTENDED RELEASE ORAL DAILY
Qty: 30 TABLET | Refills: 3 | Status: SHIPPED | OUTPATIENT
Start: 2024-04-23

## 2024-04-23 NOTE — PROGRESS NOTES
Needs (10/10/2023)    PRAPARE - Transportation     Lack of Transportation (Medical): Not on file     Lack of Transportation (Non-Medical): Yes   Physical Activity: Not on file   Stress: Not on file   Social Connections: Not on file   Intimate Partner Violence: Not on file   Housing Stability: Unknown (10/10/2023)    Housing Stability Vital Sign     Unable to Pay for Housing in the Last Year: Not on file     Number of Places Lived in the Last Year: Not on file     Unstable Housing in the Last Year: No       Contraceptive method:  none    Patient's medications, allergies, past medical, surgical, social and family histories were reviewed and updated as appropriate.    Review of Systems  As per chief complaint   All other systems reviewed and are negative.    Physical Exam:  Vitals:  /78   Pulse 54   Ht 1.715 m (5' 7.52\")   Wt (!) 149.2 kg (329 lb)   BMI 50.74 kg/m²   Lungs: CTAB   Heart : Regular S1/S2, no M/R/G  Abdomen: Soft , NT, ND , + BS   Pelvic exam : deferred    Assessment:      Diagnosis Orders   1. Urge incontinence        2. Gout involving toe, unspecified cause, unspecified chronicity, unspecified laterality        3. Dysuria  Urinalysis    Culture, Urine          Plan:     Seen and device calibrated   Return in 2 weeks       Orders Placed This Encounter   Procedures    Culture, Urine     Standing Status:   Future     Number of Occurrences:   1     Standing Expiration Date:   4/19/2025     Order Specific Question:   Specify (ex-cath, midstream, cysto, etc)?     Answer:   midstream    Urinalysis     Standing Status:   Future     Number of Occurrences:   1     Standing Expiration Date:   4/19/2025     No orders of the defined types were placed in this encounter.      Follow Up:  No follow-ups on file.        Sabine Bradford MD

## 2024-04-24 LAB
BACTERIA UR CULT: ABNORMAL
BACTERIA UR CULT: ABNORMAL
ORGANISM: ABNORMAL

## 2024-04-25 ENCOUNTER — TELEPHONE (OUTPATIENT)
Dept: OBGYN CLINIC | Age: 64
End: 2024-04-25

## 2024-04-25 DIAGNOSIS — A49.9 BACTERIAL UTI: Primary | ICD-10-CM

## 2024-04-25 DIAGNOSIS — N39.0 BACTERIAL UTI: Primary | ICD-10-CM

## 2024-04-25 RX ORDER — CEPHALEXIN 500 MG/1
500 CAPSULE ORAL 2 TIMES DAILY
Qty: 14 CAPSULE | Refills: 0 | Status: SHIPPED | OUTPATIENT
Start: 2024-04-25 | End: 2024-05-02

## 2024-04-25 NOTE — TELEPHONE ENCOUNTER
Patient would like an Rx sent to the pharmacy for her + urine culture results that were released through [employer] .

## 2024-04-28 RX ORDER — FLECAINIDE ACETATE 50 MG/1
50 TABLET ORAL 2 TIMES DAILY
Qty: 60 TABLET | Refills: 3 | Status: SHIPPED | OUTPATIENT
Start: 2024-04-28

## 2024-04-28 RX ORDER — METOPROLOL TARTRATE 50 MG/1
50 TABLET, FILM COATED ORAL 2 TIMES DAILY
Qty: 60 TABLET | Refills: 3 | Status: SHIPPED | OUTPATIENT
Start: 2024-04-28

## 2024-05-06 RX ORDER — LEVOFLOXACIN 500 MG/1
500 TABLET, FILM COATED ORAL DAILY
Qty: 10 TABLET | Refills: 0 | Status: SHIPPED | OUTPATIENT
Start: 2024-05-06 | End: 2024-05-16

## 2024-05-10 ENCOUNTER — OFFICE VISIT (OUTPATIENT)
Dept: OBGYN CLINIC | Age: 64
End: 2024-05-10
Payer: COMMERCIAL

## 2024-05-10 VITALS
HEIGHT: 68 IN | BODY MASS INDEX: 44.41 KG/M2 | SYSTOLIC BLOOD PRESSURE: 118 MMHG | DIASTOLIC BLOOD PRESSURE: 66 MMHG | WEIGHT: 293 LBS | HEART RATE: 72 BPM

## 2024-05-10 DIAGNOSIS — N39.0 RECURRENT UTI: ICD-10-CM

## 2024-05-10 DIAGNOSIS — N39.41 URGE INCONTINENCE: Primary | ICD-10-CM

## 2024-05-10 PROCEDURE — 3078F DIAST BP <80 MM HG: CPT | Performed by: OBSTETRICS & GYNECOLOGY

## 2024-05-10 PROCEDURE — 99213 OFFICE O/P EST LOW 20 MIN: CPT | Performed by: OBSTETRICS & GYNECOLOGY

## 2024-05-10 PROCEDURE — G8427 DOCREV CUR MEDS BY ELIG CLIN: HCPCS | Performed by: OBSTETRICS & GYNECOLOGY

## 2024-05-10 PROCEDURE — 3074F SYST BP LT 130 MM HG: CPT | Performed by: OBSTETRICS & GYNECOLOGY

## 2024-05-10 PROCEDURE — 3017F COLORECTAL CA SCREEN DOC REV: CPT | Performed by: OBSTETRICS & GYNECOLOGY

## 2024-05-10 PROCEDURE — 1036F TOBACCO NON-USER: CPT | Performed by: OBSTETRICS & GYNECOLOGY

## 2024-05-10 PROCEDURE — G8417 CALC BMI ABV UP PARAM F/U: HCPCS | Performed by: OBSTETRICS & GYNECOLOGY

## 2024-05-10 RX ORDER — SULFAMETHOXAZOLE AND TRIMETHOPRIM 800; 160 MG/1; MG/1
1 TABLET ORAL 2 TIMES DAILY
Qty: 20 TABLET | Refills: 0 | Status: SHIPPED | OUTPATIENT
Start: 2024-05-10 | End: 2024-05-20

## 2024-05-20 NOTE — PROGRESS NOTES
Cande Richey is a 63 y.o. female who presents here today for complaints of Follow-up (C/o continued burning with urination.)  History of urge incontinence with InterStim placed prior.  Last visit the InterStim settings were changed, but patient keeps returning with burning on urination with recurrent urinary tract infections clouding symptoms of urge incontinence.  .      Vitals:  /66 (Site: Left Lower Arm, Position: Sitting, Cuff Size: Medium Adult)   Pulse 72   Ht 1.715 m (5' 7.5\")   Wt (!) 148.3 kg (327 lb)   BMI 50.46 kg/m²   Allergies:  Erythromycin and Allopurinol  Past Medical History:   Diagnosis Date    Atrial fibrillation (HCC)     Hyperlipidemia     Hypertension     Thyroid disease      Past Surgical History:   Procedure Laterality Date    JOINT REPLACEMENT      STIMULATOR SURGERY N/A 2024    MEDTRONIC STAGE 1 AND STAGE 2 INTERSTIM [place] Sabine Bradford [place] [facility] [place]    THYROIDECTOMY       OB History          4    Para   4    Term   4            AB        Living             SAB        IAB        Ectopic        Molar        Multiple        Live Births   4              Family History   Problem Relation Age of Onset    Breast Cancer Sister      Social History     Socioeconomic History    Marital status:      Spouse name: Not on file    Number of children: Not on file    Years of education: Not on file    Highest education level: Not on file   Occupational History    Not on file   Tobacco Use    Smoking status: Never     Passive exposure: Never    Smokeless tobacco: Never   Vaping Use    Vaping Use: Never used   Substance and Sexual Activity    Alcohol use: Not Currently    Drug use: Never    Sexual activity: Defer   Other Topics Concern    Not on file   Social History Narrative    Not on file     Social Determinants of Health     Financial Resource Strain: Low Risk  (10/10/2023)    Overall Financial Resource Strain (CARDIA)     Difficulty of Paying Living  Appointment?

## 2024-05-29 ENCOUNTER — OFFICE VISIT (OUTPATIENT)
Dept: OBGYN CLINIC | Age: 64
End: 2024-05-29
Payer: COMMERCIAL

## 2024-05-29 VITALS
BODY MASS INDEX: 44.41 KG/M2 | HEIGHT: 68 IN | DIASTOLIC BLOOD PRESSURE: 72 MMHG | SYSTOLIC BLOOD PRESSURE: 120 MMHG | WEIGHT: 293 LBS

## 2024-05-29 DIAGNOSIS — N39.0 RECURRENT UTI: ICD-10-CM

## 2024-05-29 DIAGNOSIS — N39.41 URGE INCONTINENCE: ICD-10-CM

## 2024-05-29 DIAGNOSIS — N39.41 URGE INCONTINENCE: Primary | ICD-10-CM

## 2024-05-29 LAB
BILIRUB UR QL STRIP: NEGATIVE
CLARITY UR: CLEAR
COLOR UR: YELLOW
GLUCOSE UR STRIP-MCNC: NEGATIVE MG/DL
HGB UR QL STRIP: NEGATIVE
KETONES UR STRIP-MCNC: NEGATIVE MG/DL
LEUKOCYTE ESTERASE UR QL STRIP: NEGATIVE
NITRITE UR QL STRIP: NEGATIVE
PH UR STRIP: 6 [PH] (ref 5–9)
PROT UR STRIP-MCNC: NEGATIVE MG/DL
SP GR UR STRIP: 1.01 (ref 1–1.03)
UROBILINOGEN UR STRIP-ACNC: 0.2 E.U./DL

## 2024-05-29 PROCEDURE — 3074F SYST BP LT 130 MM HG: CPT | Performed by: OBSTETRICS & GYNECOLOGY

## 2024-05-29 PROCEDURE — G8417 CALC BMI ABV UP PARAM F/U: HCPCS | Performed by: OBSTETRICS & GYNECOLOGY

## 2024-05-29 PROCEDURE — 3078F DIAST BP <80 MM HG: CPT | Performed by: OBSTETRICS & GYNECOLOGY

## 2024-05-29 PROCEDURE — 1036F TOBACCO NON-USER: CPT | Performed by: OBSTETRICS & GYNECOLOGY

## 2024-05-29 PROCEDURE — G8427 DOCREV CUR MEDS BY ELIG CLIN: HCPCS | Performed by: OBSTETRICS & GYNECOLOGY

## 2024-05-29 PROCEDURE — 99213 OFFICE O/P EST LOW 20 MIN: CPT | Performed by: OBSTETRICS & GYNECOLOGY

## 2024-05-29 PROCEDURE — 3017F COLORECTAL CA SCREEN DOC REV: CPT | Performed by: OBSTETRICS & GYNECOLOGY

## 2024-05-29 RX ORDER — GRANULES FOR ORAL 3 G/1
3 POWDER ORAL ONCE
Qty: 4 EACH | Refills: 0 | Status: SHIPPED | OUTPATIENT
Start: 2024-05-29 | End: 2024-05-29

## 2024-05-29 RX ORDER — SULFAMETHOXAZOLE AND TRIMETHOPRIM 800; 160 MG/1; MG/1
1 TABLET ORAL 2 TIMES DAILY
Qty: 20 TABLET | Refills: 0 | Status: SHIPPED | OUTPATIENT
Start: 2024-05-29 | End: 2024-06-08

## 2024-05-29 RX ORDER — FESOTERODINE FUMARATE 8 MG/1
1 TABLET, FILM COATED, EXTENDED RELEASE ORAL DAILY
Qty: 30 TABLET | Refills: 3 | Status: SHIPPED | OUTPATIENT
Start: 2024-05-29

## 2024-05-31 LAB — BACTERIA UR CULT: NORMAL

## 2024-06-18 ENCOUNTER — OFFICE VISIT (OUTPATIENT)
Dept: FAMILY MEDICINE CLINIC | Age: 64
End: 2024-06-18
Payer: COMMERCIAL

## 2024-06-18 VITALS
TEMPERATURE: 97.9 F | OXYGEN SATURATION: 98 % | BODY MASS INDEX: 44.41 KG/M2 | DIASTOLIC BLOOD PRESSURE: 70 MMHG | HEIGHT: 68 IN | HEART RATE: 63 BPM | SYSTOLIC BLOOD PRESSURE: 134 MMHG | WEIGHT: 293 LBS

## 2024-06-18 DIAGNOSIS — Z78.9 DECREASED ACTIVITIES OF DAILY LIVING (ADL): ICD-10-CM

## 2024-06-18 DIAGNOSIS — I89.0 LYMPHEDEMA: ICD-10-CM

## 2024-06-18 DIAGNOSIS — M1A.09X0 CHRONIC GOUT OF MULTIPLE SITES, UNSPECIFIED CAUSE: ICD-10-CM

## 2024-06-18 DIAGNOSIS — R73.9 HYPERGLYCEMIA: Primary | ICD-10-CM

## 2024-06-18 DIAGNOSIS — I10 PRIMARY HYPERTENSION: ICD-10-CM

## 2024-06-18 DIAGNOSIS — M1A.09X0 CHRONIC GOUT OF MULTIPLE SITES, UNSPECIFIED CAUSE: Primary | ICD-10-CM

## 2024-06-18 DIAGNOSIS — Z74.09 LIMITED MOBILITY: ICD-10-CM

## 2024-06-18 DIAGNOSIS — K21.9 GASTROESOPHAGEAL REFLUX DISEASE WITHOUT ESOPHAGITIS: ICD-10-CM

## 2024-06-18 DIAGNOSIS — E03.9 HYPOTHYROIDISM, UNSPECIFIED TYPE: ICD-10-CM

## 2024-06-18 DIAGNOSIS — R73.9 HYPERGLYCEMIA: ICD-10-CM

## 2024-06-18 LAB — HBA1C MFR BLD: 5.7 %

## 2024-06-18 PROCEDURE — 99214 OFFICE O/P EST MOD 30 MIN: CPT | Performed by: NURSE PRACTITIONER

## 2024-06-18 PROCEDURE — 83036 HEMOGLOBIN GLYCOSYLATED A1C: CPT | Performed by: NURSE PRACTITIONER

## 2024-06-18 PROCEDURE — G8427 DOCREV CUR MEDS BY ELIG CLIN: HCPCS | Performed by: NURSE PRACTITIONER

## 2024-06-18 PROCEDURE — 3078F DIAST BP <80 MM HG: CPT | Performed by: NURSE PRACTITIONER

## 2024-06-18 PROCEDURE — 3017F COLORECTAL CA SCREEN DOC REV: CPT | Performed by: NURSE PRACTITIONER

## 2024-06-18 PROCEDURE — G8417 CALC BMI ABV UP PARAM F/U: HCPCS | Performed by: NURSE PRACTITIONER

## 2024-06-18 PROCEDURE — 1036F TOBACCO NON-USER: CPT | Performed by: NURSE PRACTITIONER

## 2024-06-18 PROCEDURE — 3075F SYST BP GE 130 - 139MM HG: CPT | Performed by: NURSE PRACTITIONER

## 2024-06-18 RX ORDER — GRANULES FOR ORAL 3 G/1
3 POWDER ORAL ONCE
COMMUNITY
Start: 2024-05-29

## 2024-06-18 RX ORDER — SEMAGLUTIDE 0.68 MG/ML
INJECTION, SOLUTION SUBCUTANEOUS
Qty: 3 ML | Refills: 0 | OUTPATIENT
Start: 2024-06-18

## 2024-06-18 RX ORDER — PREDNISONE 20 MG/1
20 TABLET ORAL 2 TIMES DAILY
Qty: 10 TABLET | Refills: 0 | Status: SHIPPED | OUTPATIENT
Start: 2024-06-18 | End: 2024-06-23

## 2024-06-18 RX ORDER — SEMAGLUTIDE 1.34 MG/ML
INJECTION, SOLUTION SUBCUTANEOUS
Qty: 1 ADJUSTABLE DOSE PRE-FILLED PEN SYRINGE | Refills: 0 | Status: SHIPPED | OUTPATIENT
Start: 2024-06-18

## 2024-06-18 ASSESSMENT — ENCOUNTER SYMPTOMS
RESPIRATORY NEGATIVE: 1
SHORTNESS OF BREATH: 0
BLOOD IN STOOL: 0
WHEEZING: 0

## 2024-06-18 NOTE — PROGRESS NOTES
[facility]  [facility]  [address]  [place] [address]  Dept: [phone]  Dept Fax: [phone]  Loc: [phone]     SUBJECTIVE    Cande Richey (: 1960) is a 63 y.o. female, Established patient, here for evaluation of the following chief complaint(s):  6 Month Follow-Up, OTHER (She has not been able to start Physical therapy yet), Medication Check (Would like to start steroids due to going on a trip soon), and Weight Management (Would like to try Ozempic for weight loss./Has tried different things in the past.)      PCP:  Kirsten Aguilera APRN - [place]      HPI    Patient is here for follow up on hypertension.    Do you have any of the following symptoms?  Chest Pain? No  Palpitations? No  PARDO/SOB? No  Headache? No  Peripheral Edema? No  Light Headedness? No    Last labs:  Lab Results   Component Value Date/Time     2023 06:10 AM    K 4.4 2023 06:10 AM    K 3.9 2023 05:30 AM     2023 06:10 AM    CO2 25 2023 06:10 AM    BUN 29 2023 06:10 AM    CREATININE 1.00 2023 06:10 AM    GLUCOSE 72 2023 06:10 AM    CALCIUM 9.7 2023 06:10 AM       Lab Results   Component Value Date    CHOL 153 2024     Lab Results   Component Value Date    TRIG 151 (H) 2024     Lab Results   Component Value Date    HDL 38 (L) 2024     No components found for: \"LDLCHOLESTEROL\", \"LDLCALC\"  No results found for: \"VLDL\"  No results found for: \"CHOLHDLRATIO\"     Gout:  Stable however would like prednisone as she will be taking a trip in August and would like it just in case she flares.     Weight:  Would like to try ozempic,   Hemoglobin A1C   Date Value Ref Range Status   2024 5.7 % Final         Review of Systems   Constitutional: Negative.    Respiratory: Negative.  Negative for shortness of breath and wheezing.    Cardiovascular:

## 2024-06-20 ENCOUNTER — OFFICE VISIT (OUTPATIENT)
Dept: CARDIOLOGY CLINIC | Age: 64
End: 2024-06-20
Payer: COMMERCIAL

## 2024-06-20 VITALS
WEIGHT: 293 LBS | DIASTOLIC BLOOD PRESSURE: 82 MMHG | HEIGHT: 68 IN | BODY MASS INDEX: 44.41 KG/M2 | OXYGEN SATURATION: 97 % | SYSTOLIC BLOOD PRESSURE: 110 MMHG | HEART RATE: 71 BPM

## 2024-06-20 DIAGNOSIS — I48.0 PAROXYSMAL ATRIAL FIBRILLATION (HCC): Primary | ICD-10-CM

## 2024-06-20 PROCEDURE — 99214 OFFICE O/P EST MOD 30 MIN: CPT | Performed by: INTERNAL MEDICINE

## 2024-06-20 PROCEDURE — 93000 ELECTROCARDIOGRAM COMPLETE: CPT | Performed by: INTERNAL MEDICINE

## 2024-06-20 PROCEDURE — G8417 CALC BMI ABV UP PARAM F/U: HCPCS | Performed by: INTERNAL MEDICINE

## 2024-06-20 PROCEDURE — 3079F DIAST BP 80-89 MM HG: CPT | Performed by: INTERNAL MEDICINE

## 2024-06-20 PROCEDURE — 3017F COLORECTAL CA SCREEN DOC REV: CPT | Performed by: INTERNAL MEDICINE

## 2024-06-20 PROCEDURE — 1036F TOBACCO NON-USER: CPT | Performed by: INTERNAL MEDICINE

## 2024-06-20 PROCEDURE — G8427 DOCREV CUR MEDS BY ELIG CLIN: HCPCS | Performed by: INTERNAL MEDICINE

## 2024-06-20 PROCEDURE — 3074F SYST BP LT 130 MM HG: CPT | Performed by: INTERNAL MEDICINE

## 2024-06-20 RX ORDER — FLECAINIDE ACETATE 50 MG/1
50 TABLET ORAL 2 TIMES DAILY
Qty: 180 TABLET | Refills: 3 | Status: SHIPPED | OUTPATIENT
Start: 2024-06-20

## 2024-06-20 RX ORDER — METOPROLOL TARTRATE 50 MG/1
50 TABLET, FILM COATED ORAL 2 TIMES DAILY
Qty: 180 TABLET | Refills: 3 | Status: SHIPPED | OUTPATIENT
Start: 2024-06-20

## 2024-06-20 RX ORDER — OLMESARTAN MEDOXOMIL 40 MG/1
40 TABLET ORAL DAILY
Qty: 90 TABLET | Refills: 3 | Status: SHIPPED | OUTPATIENT
Start: 2024-06-20

## 2024-06-20 NOTE — PATIENT INSTRUCTIONS
Continue current cardiac medications.  I will speak with Dr Vazquez about getting your home sleep study re ordered. If you don't hear from them in the next week please call their office.   Follow up in 1 year, sooner if needed.

## 2024-06-20 NOTE — PROGRESS NOTES
2024    Kirsten Aguilera, APRN - CNP  [address]  [place] [address]    RE: Cande Richey   : 1960     Dear Kirsten Aguilera, TRAE - CNP :    I had the pleasure of seeing your patient, Cande Richey in the office today on 2024.    As you are well aware, Ms. Richey is a pleasant 63 y.o.  female who was kindly referred to me for evaluation of dyspnea atrial fibrillation.  She was diagnosed with atrial fibrillation 4 years ago and was previously following with a cardiologist in [place], last seen May 2023.  She had a cardiac catheterization 2021 and showed me results revealing normal coronary arteries with EF 55%.  She was recently hospitalized at [facility] in August.  Currently, she reports intermittent palpitations from atrial fibrillation described as \"pounding\" heartbeats that occur 2-3 times a week.  Most episodes last a few minutes before gradually resolving.  She denies associated near-syncope or syncope.  She has occasional right-sided chest discomfort which radiates to the back.  No midsternal left-sided chest discomfort with activity.  She reports compliance with medications including Eliquis metoprolol, and flecainide.      23: Patient here for follow-up of paroxysmal atrial fibrillation and recent event monitor results.  Currently, she reports feeling better.  She reports less palpitations.  No chest pain.  No worsening exertional dyspnea.  She reports compliance with medications including Eliquis.    24: Patient here for follow-up of paroxysmal atrial fibrillation on flecainide, metoprolol, and Eliquis.  Currently, she reports feeling reasonably well.  She denies any significant palpitations on rate/rhythm control therapy.  No near-syncope or syncope.  She still has dyspnea with more physical degrees of activity but this is chronic and unchanged.  She denies any anginal chest pain.  She reports compliance with medications.  She saw sleep

## 2024-06-22 DIAGNOSIS — I89.0 LYMPHEDEMA: ICD-10-CM

## 2024-06-22 DIAGNOSIS — M10.9 ACUTE GOUT OF MULTIPLE SITES, UNSPECIFIED CAUSE: ICD-10-CM

## 2024-06-22 DIAGNOSIS — Z12.4 SCREENING FOR CERVICAL CANCER: ICD-10-CM

## 2024-06-23 ENCOUNTER — PATIENT MESSAGE (OUTPATIENT)
Dept: FAMILY MEDICINE CLINIC | Age: 64
End: 2024-06-23

## 2024-06-23 DIAGNOSIS — M10.9 ACUTE GOUT OF MULTIPLE SITES, UNSPECIFIED CAUSE: ICD-10-CM

## 2024-06-23 DIAGNOSIS — Z12.4 SCREENING FOR CERVICAL CANCER: ICD-10-CM

## 2024-06-23 DIAGNOSIS — I89.0 LYMPHEDEMA: ICD-10-CM

## 2024-06-23 NOTE — TELEPHONE ENCOUNTER
Pharmacy requesting medication refill. Please approve or deny this request.    Rx requested:  Requested Prescriptions     Pending Prescriptions Disp Refills    folic acid (FOLVITE) 1 MG tablet [Pharmacy Med Name: folic acid 1 mg tablet] 90 tablet 0     Sig: TAKE 1 TABLET BY MOUTH DAILY    spironolactone (ALDACTONE) 25 MG tablet [Pharmacy Med Name: spironolactone 25 mg tablet] 90 tablet 0     Sig: TAKE 1 TABLET BY MOUTH DAILY    furosemide (LASIX) 20 MG tablet [Pharmacy Med Name: furosemide 20 mg tablet] 90 tablet 0     Sig: TAKE 1 TABLET BY MOUTH DAILY    febuxostat (ULORIC) 80 MG TABS tablet [Pharmacy Med Name: febuxostat 80 mg tablet] 30 tablet 2     Sig: TAKE 1 TABLET BY MOUTH DAILY         Last Office Visit:   6/18/2024      Next Visit Date:  Future Appointments   Date Time Provider Department [place]   6/24/2024  9:15 AM Sabine Bradford MD [facility]   6/28/2024 10:00 AM Flavia Hunter, PT [facility] AM PT [facility]   7/30/2024 11:30 AM Kirsten Aguilera, APRN - CNP [facility]   6/26/2025  1:00 PM Beverley Adam DO [place] Tanes[facility] Ruggiero

## 2024-06-24 ENCOUNTER — OFFICE VISIT (OUTPATIENT)
Dept: OBGYN CLINIC | Age: 64
End: 2024-06-24
Payer: COMMERCIAL

## 2024-06-24 VITALS
HEIGHT: 68 IN | BODY MASS INDEX: 50.46 KG/M2 | DIASTOLIC BLOOD PRESSURE: 78 MMHG | HEART RATE: 84 BPM | SYSTOLIC BLOOD PRESSURE: 116 MMHG

## 2024-06-24 DIAGNOSIS — N39.0 RECURRENT UTI: ICD-10-CM

## 2024-06-24 DIAGNOSIS — N39.41 URGE INCONTINENCE: ICD-10-CM

## 2024-06-24 DIAGNOSIS — N39.41 URGE INCONTINENCE: Primary | ICD-10-CM

## 2024-06-24 LAB
BILIRUB UR QL STRIP: NEGATIVE
CLARITY UR: CLEAR
COLOR UR: YELLOW
GLUCOSE UR STRIP-MCNC: NEGATIVE MG/DL
HGB UR QL STRIP: NEGATIVE
KETONES UR STRIP-MCNC: NEGATIVE MG/DL
LEUKOCYTE ESTERASE UR QL STRIP: NEGATIVE
NITRITE UR QL STRIP: NEGATIVE
PH UR STRIP: 5.5 [PH] (ref 5–9)
PROT UR STRIP-MCNC: NEGATIVE MG/DL
SP GR UR STRIP: 1.02 (ref 1–1.03)
UROBILINOGEN UR STRIP-ACNC: 0.2 E.U./DL

## 2024-06-24 PROCEDURE — G8427 DOCREV CUR MEDS BY ELIG CLIN: HCPCS | Performed by: OBSTETRICS & GYNECOLOGY

## 2024-06-24 PROCEDURE — 3074F SYST BP LT 130 MM HG: CPT | Performed by: OBSTETRICS & GYNECOLOGY

## 2024-06-24 PROCEDURE — 1036F TOBACCO NON-USER: CPT | Performed by: OBSTETRICS & GYNECOLOGY

## 2024-06-24 PROCEDURE — 99213 OFFICE O/P EST LOW 20 MIN: CPT | Performed by: OBSTETRICS & GYNECOLOGY

## 2024-06-24 PROCEDURE — G8417 CALC BMI ABV UP PARAM F/U: HCPCS | Performed by: OBSTETRICS & GYNECOLOGY

## 2024-06-24 PROCEDURE — 3017F COLORECTAL CA SCREEN DOC REV: CPT | Performed by: OBSTETRICS & GYNECOLOGY

## 2024-06-24 PROCEDURE — 3078F DIAST BP <80 MM HG: CPT | Performed by: OBSTETRICS & GYNECOLOGY

## 2024-06-24 RX ORDER — GRANULES FOR ORAL 3 G/1
POWDER ORAL
Refills: 0 | OUTPATIENT
Start: 2024-06-24

## 2024-06-24 RX ORDER — MIRABEGRON 50 MG/1
50 TABLET, EXTENDED RELEASE ORAL DAILY
Qty: 30 TABLET | Refills: 6 | Status: SHIPPED | OUTPATIENT
Start: 2024-06-24

## 2024-06-24 RX ORDER — GRANULES FOR ORAL 3 G/1
3 POWDER ORAL ONCE
Qty: 1 EACH | Refills: 0 | Status: SHIPPED | OUTPATIENT
Start: 2024-06-24 | End: 2024-06-24

## 2024-06-24 NOTE — TELEPHONE ENCOUNTER
Future Appointments    Encounter Information   Provider Department Appt Notes   6/24/2024 Sabine Bradford MD [facility] med check   6/28/2024 Flavia Hunter, [facility]    7/30/2024 Kirsten Aguilera APRN - CNP [facility] 6 week f/u   6/26/2025 Beverley Adam DO [facility] 1 year f/u     Past Visits    Date Provider Specialty Visit Type Primary Dx   06/20/2024 Beverley Adam DO Cardiology Office Visit Paroxysmal atrial fibrillation (HCC)   06/18/2024 Kirsten Aguilera APRN - CNP Family Medicine Office Visit Hyperglycemia   05/29/2024 Sabine Bradford MD Obstetrics and Gynecology Office Visit Urge incontinence   05/10/2024 Sabine Bradford MD Obstetrics and Gynecology Office Visit Urge incontinence   04/22/2024 Sabine Bradford MD Obstetrics and Gynecology Office Visit Urge incontinence

## 2024-06-24 NOTE — PROGRESS NOTES
Cande Richey is a 63 y.o. female who presents here today for complaints of Follow-up (Toviaz, fosfomycin, Bactrim. She states she's been feeling better. )  Following up on urge incontinence and recurrent urinary tract infections, patient was having issues with her InterStim not controlling the urge symptoms to a satisfactory point despite major improvement since prior to placing the InterStim.  Last visit the patient was started on supplemental Myrbetriq 50 mg p.o. daily which she was not responsive to prior to the InterStim but we did discuss supplementing the InterStim effect with the Myrbetriq.  Patient also is taking fosfomycin every 10 days for suppression of recurrent urinary tract infections.  Patient comes in today stating that her symptoms are completely controlled.  .      Vitals:  /78 (Site: Left Lower Arm, Position: Sitting, Cuff Size: Medium Adult)   Pulse 84   Ht 1.715 m (5' 7.5\")   BMI 50.46 kg/m²   Allergies:  Erythromycin and Allopurinol  Past Medical History:   Diagnosis Date    Atrial fibrillation (HCC)     Hyperlipidemia     Hypertension     Thyroid disease      Past Surgical History:   Procedure Laterality Date    JOINT REPLACEMENT      STIMULATOR SURGERY N/A 2024    MEDTRONIC STAGE 1 AND STAGE 2 INTERSTIM performed by Sabine Bradford MD [place] [facility] [place]    THYROIDECTOMY       OB History          4    Para   4    Term   4            AB        Living             SAB        IAB        Ectopic        Molar        Multiple        Live Births   4              Family History   Problem Relation Age of Onset    Breast Cancer Sister      Social History     Socioeconomic History    Marital status:      Spouse name: Not on file    Number of children: Not on file    Years of education: Not on file    Highest education level: Not on file   Occupational History    Not on file   Tobacco Use    Smoking status: Never     Passive exposure: Never    Smokeless tobacco: Never

## 2024-06-24 NOTE — TELEPHONE ENCOUNTER
From: Cande Richey  To: Kirsten Aguilera  Sent: 6/23/2024 9:06 AM EDT  Subject: Refill medication     I am unable to refill on the vinicius Atorvastian 40mg tablets. Could you please send a refill to [facility] in [place] ? I have enough to Wednesday of this week.    Thank you, Cande Richey   [phone]

## 2024-06-25 RX ORDER — FOLIC ACID 1 MG/1
1 TABLET ORAL DAILY
Qty: 90 TABLET | Refills: 0 | Status: SHIPPED | OUTPATIENT
Start: 2024-06-25

## 2024-06-25 RX ORDER — SPIRONOLACTONE 25 MG/1
25 TABLET ORAL DAILY
Qty: 90 TABLET | Refills: 0 | Status: SHIPPED | OUTPATIENT
Start: 2024-06-25

## 2024-06-25 RX ORDER — FEBUXOSTAT 80 MG/1
80 TABLET, FILM COATED ORAL DAILY
Qty: 90 TABLET | Refills: 0 | Status: SHIPPED | OUTPATIENT
Start: 2024-06-25

## 2024-06-25 RX ORDER — FUROSEMIDE 20 MG/1
20 TABLET ORAL DAILY
Qty: 90 TABLET | Refills: 0 | Status: SHIPPED | OUTPATIENT
Start: 2024-06-25

## 2024-06-25 RX ORDER — ATORVASTATIN CALCIUM 40 MG/1
40 TABLET, FILM COATED ORAL NIGHTLY
Qty: 30 TABLET | Refills: 0 | Status: SHIPPED | OUTPATIENT
Start: 2024-06-25

## 2024-06-25 RX ORDER — FEBUXOSTAT 80 MG/1
80 TABLET, FILM COATED ORAL DAILY
Qty: 30 TABLET | Refills: 2 | Status: SHIPPED | OUTPATIENT
Start: 2024-06-25

## 2024-06-26 LAB — BACTERIA UR CULT: NORMAL

## 2024-06-28 ENCOUNTER — HOSPITAL ENCOUNTER (OUTPATIENT)
Dept: PHYSICAL THERAPY | Age: 64
Setting detail: THERAPIES SERIES
Discharge: HOME | End: 2024-06-28
Payer: COMMERCIAL

## 2024-06-28 PROCEDURE — 97162 PT EVAL MOD COMPLEX 30 MIN: CPT

## 2024-06-28 NOTE — THERAPY EVALUATION
Physical Therapy Evaluation/Plan of Care   [place] [address] [id number]  Dept: [phone]  Dept Fax: [phone]  Loc: [phone]    Physical Therapy: Initial Evaluation    General Information    Patient: Cande Richey (63 y.o.     female)   Examination Date: 2024   :  1960 ;    Confirmed: Yes MRN: [id number]  CSN: [id number]   Insurance: Payor: [employer] / Plan: "[employer] - CHOICE PLU / Product Type: *No Product type* /   Insurance ID: [id number] - (Commercial)  PT Insurance Information: [facility] Secondary Insurance (if applicable):     Referring Physician: Kirsten Aguilera APRN - CNP       Visits to Date/Visits Approved:   ($20 copay)    No Show/Cancelled Appts: 0 / 0     Medical Diagnosis: Chronic gout of multiple sites, unspecified cause [M1A.09X0]  Lymphedema [I89.0]  Limited mobility [Z74.09]  Decreased activities of daily living (ADL) [Z78.9]        Treatment Diagnosis: LE weakness, Impaired mobility      SUBJECTIVE:     Onset date:        Subjective/ Mechanism of Injury: In , had a Rt THR and had some PT.  Was walking with a cane after 3 months.  Tripped and fell and fractured pelvis in 2021.  Pt did not have therapy after that.  All of this happened in [place].  In 2022 was told to wean off the walker but still no PT.  Pt reports muscle in front of Rt hip does nothing and has to use hands to lift her leg.  Pain of that muscle will wake her up when trying to roll.  Unable to stand >4 minutes due to getting shaky.  Has been experiencing gout and does well when on steroid.  Every once and a while she is unsteady.  Has a cane she will use or will cruise along the counters.  Pt primarily uses the walker to walk.  Pt is able to drive short distances once she gets herself into position.  Has a lot of difficulty getting Rt LE in and out.  Pt reports feels the muscle

## 2024-07-02 ENCOUNTER — HOSPITAL ENCOUNTER (OUTPATIENT)
Dept: PHYSICAL THERAPY | Age: 64
Setting detail: THERAPIES SERIES
Discharge: HOME | End: 2024-07-02
Payer: COMMERCIAL

## 2024-07-02 PROCEDURE — 97110 THERAPEUTIC EXERCISES: CPT

## 2024-07-02 ASSESSMENT — PAIN DESCRIPTION - PAIN TYPE: TYPE: CHRONIC PAIN

## 2024-07-02 ASSESSMENT — PAIN DESCRIPTION - DESCRIPTORS: DESCRIPTORS: ACHING

## 2024-07-02 ASSESSMENT — PAIN DESCRIPTION - ORIENTATION: ORIENTATION: LEFT;RIGHT

## 2024-07-02 ASSESSMENT — PAIN DESCRIPTION - LOCATION: LOCATION: LEG

## 2024-07-02 ASSESSMENT — PAIN SCALES - GENERAL: PAINLEVEL_OUTOF10: 4

## 2024-07-02 NOTE — PROGRESS NOTES
[facility]  Outpatient Physical Therapy    Treatment Note        Date: 2024  Patient: Cande Richey  : 1960   Confirmed: Yes  MRN: [id number]  Referring Provider: Kirsten Aguilera APRN - CNP    Medical Diagnosis: Chronic gout of multiple sites, unspecified cause [M1A.09X0]  Lymphedema [I89.0]  Limited mobility [Z74.09]  Decreased activities of daily living (ADL) [Z78.9]       Treatment Diagnosis: LE weakness, Impaired mobility    Visit Information:  Insurance: Payor: [employer] / Plan: [employer] - CHOICE PLU / Product Type: *No Product type* /   PT Visit Information  PT Insurance Information: [facility]  Total # of Visits Approved:  ($20 copay)  Total # of Visits to Date: 1  No Show: 0  Canceled Appointment: 0  Progress Note Counter:     Subjective Information:  Subjective: Patient c/o increased pain after standing several minutes in the waiting room prior to appt. Notes she has a trip planned for august and she is motivated to get stronger.  HEP Compliance:  [x] Good [] Fair [] Poor [] Reports not doing due to:    Pain Screening  Patient Currently in Pain: Yes  Pain Assessment: 0-10  Pain Level: 4  Pain Type: Chronic pain  Pain Location: Leg  Pain Orientation: Left, Right  Pain Descriptors: Aching    Treatment:  Exercises:  Exercises  Exercise 2: Hooklying march R only AAROM x10  Exercise 4: Hooklying lateral stepping over st cane 2x5  Exercise 5: Seated open chain: LAQ, heel slides, hip abduction x10 ea  Exercise 7: SciFit level 1 x 5 minutes  Exercise 8: Single stepping: lateral in // bars 1 UE support x10 B  Exercise 20: HEP: side stepping at countertop, standing march, seated hip adduction    Assessment:   Body Structures, Functions, Activity Limitations Requiring Skilled Therapeutic Intervention: Decreased functional mobility , Decreased ROM, Decreased strength, Increased pain  Assessment: Initiated gentle R LE strengthening exercises to improve functional mobility

## 2024-07-05 ENCOUNTER — HOSPITAL ENCOUNTER (OUTPATIENT)
Dept: PHYSICAL THERAPY | Age: 64
Setting detail: THERAPIES SERIES
Discharge: HOME | End: 2024-07-05
Payer: COMMERCIAL

## 2024-07-05 PROCEDURE — 97112 NEUROMUSCULAR REEDUCATION: CPT

## 2024-07-05 PROCEDURE — 97110 THERAPEUTIC EXERCISES: CPT

## 2024-07-05 ASSESSMENT — PAIN DESCRIPTION - ORIENTATION: ORIENTATION: RIGHT;LEFT

## 2024-07-05 ASSESSMENT — PAIN DESCRIPTION - LOCATION: LOCATION: LEG

## 2024-07-05 ASSESSMENT — PAIN DESCRIPTION - DESCRIPTORS: DESCRIPTORS: ACHING

## 2024-07-05 ASSESSMENT — PAIN SCALES - GENERAL: PAINLEVEL_OUTOF10: 2

## 2024-07-05 ASSESSMENT — PAIN DESCRIPTION - PAIN TYPE: TYPE: CHRONIC PAIN

## 2024-07-05 NOTE — PROGRESS NOTES
[facility]  Outpatient Physical Therapy    Treatment Note        Date: 2024  Patient: Cande Richey  : 1960   Confirmed: Yes  MRN: [id number]  Referring Provider: Kirsten Aguilera APRN - CNP    Medical Diagnosis: Chronic gout of multiple sites, unspecified cause [M1A.09X0]  Lymphedema [I89.0]  Limited mobility [Z74.09]  Decreased activities of daily living (ADL) [Z78.9]       Treatment Diagnosis: LE weakness, Impaired mobility    Visit Information:  Insurance: Payor: UNITED HE[employer]CARE / Plan: UNITED HEALTHCARE - CHOICE PLU / Product Type: *No Product type* /   PT Visit Information  PT Insurance Information: [facility]  Total # of Visits Approved:  ($20 copay)  Total # of Visits to Date: 2  No Show: 0  Canceled Appointment: 0  Progress Note Counter:     Subjective Information:  Subjective: Pt states she needs to adjust her time shcedule because after LV she was sor efor 12 hrs  HEP Compliance:  [] Good [x] Fair [] Poor [] Reports not doing due to:      Pain Screening  Patient Currently in Pain: Yes  Pain Assessment: 0-10  Pain Level: 2  Pain Type: Chronic pain  Pain Location: Leg  Pain Orientation: Right, Left  Pain Descriptors: Aching    Treatment:  Exercises:  Exercises  Exercise 1: H/L Heel slides: flex/abd x 10  Exercise 2: Hooklying march Omar  x10  Exercise 3: QS 3\" x 10  Exercise 5: Seated open chain: LAQ, heel slides, hip abduction with YTB x10 ea  Exercise 6: Gait drills : F/L/R  Exercise 7: SciFit level 1 x 5 minutes, seat 10  Exercise 10: Standing hip ext/ knee flex with alternating legs.  Exercise 20: HEP:seated  QS/ LAQhip abd with resistance       *Indicates exercise, modality, or manual techniques to be initiated when appropriate    Objective Measures:      Strength: [x] NT  [] MMT completed:     ROM: [x] NT  [] ROM measurements:      LTG 1 Current Status:: ; Progressed strengthening     Assessment:   Body Structures, Functions, Activity Limitations Requiring Skilled

## 2024-07-08 ENCOUNTER — HOSPITAL ENCOUNTER (OUTPATIENT)
Dept: PHYSICAL THERAPY | Age: 64
Setting detail: THERAPIES SERIES
Discharge: HOME | End: 2024-07-08
Payer: COMMERCIAL

## 2024-07-08 NOTE — PROGRESS NOTES
Therapy                            Cancellation/No-show Note    Date: 2024  Patient: Cande Richey (63 y.o. female)  : 1960  MRN:  [id number]  Referring Physician: Kirsten Aguilera APRN - CNP    Medical Diagnosis: Chronic gout of multiple sites, unspecified cause [M1A.09X0]  Lymphedema [I89.0]  Limited mobility [Z74.09]  Decreased activities of daily living (ADL) [Z78.9]      Visit Information:  Insurance: Payor: UNITED HEALTHCARE / Plan: [employer] PLU / Product Type: *No Product type* /   Visits to Date: 2   No Show/Cancelled Appts: 0       For today's appointment patient:  [x]  Cancelled  []  Rescheduled appointment  []  No-show   []  Called pt to remind of next appointment     Reason given by patient:  [x]  Patient ill, all weekend  []  Conflicting appointment  []  No transportation    []  Conflict with work  []  No reason given  []  Other:      [x] Pt has future appointments scheduled, no follow up needed  [] Pt requests to be on hold.    Reason:   If > 2 weeks please discuss with therapist.  [] Therapist to call pt for follow up     Comments:       Signature: Electronically signed by Rosanne Haynes PTA on 24 at 10:49 AM EDT

## 2024-07-12 ENCOUNTER — HOSPITAL ENCOUNTER (OUTPATIENT)
Dept: PHYSICAL THERAPY | Age: 64
Setting detail: THERAPIES SERIES
Discharge: HOME | End: 2024-07-12
Payer: COMMERCIAL

## 2024-07-12 DIAGNOSIS — R73.9 HYPERGLYCEMIA: ICD-10-CM

## 2024-07-12 PROCEDURE — 97110 THERAPEUTIC EXERCISES: CPT

## 2024-07-12 ASSESSMENT — PAIN DESCRIPTION - LOCATION: LOCATION: LEG

## 2024-07-12 ASSESSMENT — PAIN DESCRIPTION - ORIENTATION: ORIENTATION: RIGHT

## 2024-07-12 ASSESSMENT — PAIN SCALES - GENERAL: PAINLEVEL_OUTOF10: 6

## 2024-07-12 NOTE — TELEPHONE ENCOUNTER
Please approve or deny request. Thank you!    Rx requested:  Requested Prescriptions     Pending Prescriptions Disp Refills    Semaglutide,0.25 or 0.5MG/DOS, (OZEMPIC, 0.25 OR 0.5 MG/DOSE,) 2 MG/1.5ML SOPN 1 Adjustable Dose Pre-filled Pen Syringe 0     Sig: Inject 0.25mg subcut weekly for 2 weeks, then increase to 0.5mg subcut weekly         Last Office Visit:   6/18/2024      Next Visit Date:  Future Appointments   Date Time Provider Department Center   7/12/2024  1:00 PM Flavia Hunter, PT [facility] AM PT [facility] [place]   7/16/2024 11:00 AM ORTHO TWO [facility] AM PT [facility] [place]   7/19/2024 11:00 AM Dedra TorresOZ AM PT [facility] [place]   7/19/2024  2:00 PM Mihai Ramirez PA [facility] [place]   7/23/2024 11:00 AM NEURO ONE [facility] AM PT [facility] [place]   7/26/2024 11:00 AM Dedra Torres [facility] AM PT [facility] [place]   7/30/2024 11:30 AM Kirsten Aguilera, APRN - CNP [facility] [place]   9/24/2024  9:00 AM Sabine Bradford MD [facility] [place]   6/26/2025  1:00 PM Beverley Adam, DO [place] [facility] [place]

## 2024-07-12 NOTE — PROGRESS NOTES
[facility]  Outpatient Physical Therapy    Treatment Note        Date: 2024  Patient: Cande Richey  : 1960   Confirmed: Yes  MRN: [id number]  Referring Provider: Kirsten Aguilera APRN - CNP    Medical Diagnosis: Chronic gout of multiple sites, unspecified cause [M1A.09X0]  Lymphedema [I89.0]  Limited mobility [Z74.09]  Decreased activities of daily living (ADL) [Z78.9]       Treatment Diagnosis: LE weakness, Impaired mobility    Visit Information:  Insurance: Payor: UNITED HEALTHCARE / Plan: UNITED HEALTHCARE - CHOICE PLU / Product Type: *No Product type* /   PT Visit Information  PT Insurance Information: [facility]  Total # of Visits Approved:  ($20 copay)  Total # of Visits to Date: 3  No Show: 0  Canceled Appointment: 1  Progress Note Counter: 3/12    Subjective Information:  Subjective: Pt reports feeling rough today due to having to drive herself today.  Pt reports difficulty holding Rt foot up to switch pedals.  Overdid it walking with the cane and now Lt hip hurts.  HEP Compliance:  [x] Good [] Fair [] Poor [] Reports not doing due to:    Pain Screening  Patient Currently in Pain: Yes  Pain Assessment: 0-10  Pain Level: 6  Pain Location: Leg  Pain Orientation: Right    Treatment:  Exercises:  Exercises  Exercise 5: Seated open chain: LAQ, heel slides, hip abduction with YTB 5s x10 ea  Exercise 7: SciFit level 1 x 5 minutes, seat 10  Exercise 9: Seated lateral step overs with 3\" box x10 parul  Exercise 10: Standing hip abd - able to do some but limited due to pain  Exercise 11: Standing hip flexor str 30s x3 parul     Objective Measures:     STG 2 Current Status:: 24: continues to rely heavily on parul UEs    Assessment:   Body Structures, Functions, Activity Limitations Requiring Skilled Therapeutic Intervention: Decreased functional mobility , Decreased ROM, Decreased strength, Increased pain  Assessment: Focused on sitting and standing exercises as pt reports increased pain with lying

## 2024-07-13 DIAGNOSIS — K21.9 GASTROESOPHAGEAL REFLUX DISEASE WITHOUT ESOPHAGITIS: ICD-10-CM

## 2024-07-15 RX ORDER — GRANULES FOR ORAL 3 G/1
3 POWDER ORAL
Qty: 4 EACH | Refills: 5 | Status: SHIPPED | OUTPATIENT
Start: 2024-07-15

## 2024-07-15 NOTE — TELEPHONE ENCOUNTER
Please approve or deny request. Thank you!    Rx requested:  Requested Prescriptions     Pending Prescriptions Disp Refills    omeprazole (PRILOSEC) 40 MG delayed release capsule [Pharmacy Med Name: omeprazole 40 mg capsule,delayed release] 90 capsule 2     Sig: TAKE 1 CAPSULE BY MOUTH DAILY         Last Office Visit:   6/18/2024      Next Visit Date:  Future Appointments   Date Time Provider Department Center   7/16/2024 11:00 AM [facility] [place]   7/19/2024 11:00 AM NEURO ONE [facility] AM PT [facility]   7/19/2024  2:00 PM Mihai Ramirez PA [facility] [place]   7/23/2024 11:00 AM [facility] [place]   7/26/2024 11:00 AM [facility] [place]   7/30/2024 11:30 AM Kirsten Aguilera, APRN - CNP [facility] [place]   9/24/2024  9:00 AM Sabine Bradford MD [facility] [place]   6/26/2025  1:00 PM Beverley Adam, DO [place] [facility] [place]

## 2024-07-16 ENCOUNTER — HOSPITAL ENCOUNTER (OUTPATIENT)
Dept: PHYSICAL THERAPY | Age: 64
Setting detail: THERAPIES SERIES
Discharge: HOME | End: 2024-07-16
Payer: COMMERCIAL

## 2024-07-16 ENCOUNTER — PATIENT MESSAGE (OUTPATIENT)
Dept: FAMILY MEDICINE CLINIC | Age: 64
End: 2024-07-16

## 2024-07-16 RX ORDER — SEMAGLUTIDE 1.34 MG/ML
INJECTION, SOLUTION SUBCUTANEOUS
Qty: 1 ADJUSTABLE DOSE PRE-FILLED PEN SYRINGE | Refills: 0 | OUTPATIENT
Start: 2024-07-16

## 2024-07-16 RX ORDER — OMEPRAZOLE 40 MG/1
40 CAPSULE, DELAYED RELEASE ORAL DAILY
Qty: 90 CAPSULE | Refills: 2 | Status: SHIPPED | OUTPATIENT
Start: 2024-07-16

## 2024-07-16 RX ORDER — ATORVASTATIN CALCIUM 40 MG/1
40 TABLET, FILM COATED ORAL NIGHTLY
Qty: 30 TABLET | Refills: 3 | Status: SHIPPED | OUTPATIENT
Start: 2024-07-16

## 2024-07-16 NOTE — TELEPHONE ENCOUNTER
From: Cande Richey  To: Kirsten Aguilera  Sent: 7/16/2024 12:28 PM EDT  Subject: Semi glutide refill request    I received a message from the pharmacy regarding the refill this morning. The initial prescription was for 2 weeks at .25 and 2 weeks of .5. What was filled was 4 weeks of the .25. The new prescription is at 1.2 which means i would be skipping the .5 dosage. They are holding off until i hear back from you about the dosage. They have said that they do not fill half prescriptions which is why they could not do the 2 week prescriptions.     After 2 weeks i am down 5 lbs. and am experiencing diarrhea.     Please let me know.     Cadne Richey

## 2024-07-16 NOTE — TELEPHONE ENCOUNTER
Please approve or deny request. Thank you!    Rx requested:  Requested Prescriptions     Pending Prescriptions Disp Refills    atorvastatin (LIPITOR) 40 MG tablet 30 tablet 3     Sig: Take 1 tablet by mouth nightly Indications: High Amount of Fats in the Blood         Last Office Visit:   6/18/2024      Next Visit Date:  Future Appointments   Date Time Provider Department Center   7/16/2024 11:00 AM ORTHO TWO [facility] AM PT [facility] [place]   7/19/2024 11:00 AM NEURO ONE [facility]LOZ AM PT [facility] [place]   7/19/2024  2:00 PM Mihai Ramirez, PA [facility] [place]   7/23/2024 11:00 AM NEURO ONE MLOZ AM PT [facility] [place]   7/26/2024 11:00 AM Dedra Torres MLOZ AM PT [facility] [place]   7/30/2024 11:30 AM Kirsten Aguilera, APRN - CNP [facility] [place]   9/24/2024  9:00 AM Sabine Bradford MD [facility] [place]   6/26/2025  1:00 PM Beverley Adam, DO [place] Card [facility]

## 2024-07-16 NOTE — PROGRESS NOTES
Therapy                            Cancellation/No-show Note    Date: 2024  Patient: Cande Richey (63 y.o. female)  : 1960  MRN:  [id number]  Referring Physician: Kirsten Aguilera APRN - CNP    Medical Diagnosis: Chronic gout of multiple sites, unspecified cause [M1A.09X0]  Lymphedema [I89.0]  Limited mobility [Z74.09]  Decreased activities of daily living (ADL) [Z78.9]      Visit Information:  Insurance: Payor: UNITED HEALTHCARE / Plan: [employer] PLU / Product Type: *No Product type* /   Visits to Date: 3   No Show/Cancelled Appts: 0 / 2      For today's appointment patient:  [x]  Cancelled  []  Rescheduled appointment  []  No-show   []  Called pt to remind of next appointment     Reason given by patient:  [x]  Patient ill  []  Conflicting appointment  []  No transportation    []  Conflict with work  []  No reason given  []  Other:      [] Pt has future appointments scheduled, no follow up needed  [] Pt requests to be on hold.    Reason:   If > 2 weeks please discuss with therapist.  [] Therapist to call pt for follow up     Comments:       Signature: Electronically signed by Em Riggs PTA on 24 at 11:16 AM EDT

## 2024-07-19 ENCOUNTER — HOSPITAL ENCOUNTER (OUTPATIENT)
Dept: PHYSICAL THERAPY | Age: 64
Setting detail: THERAPIES SERIES
Discharge: HOME | End: 2024-07-19
Payer: COMMERCIAL

## 2024-07-19 PROCEDURE — 97110 THERAPEUTIC EXERCISES: CPT

## 2024-07-19 PROCEDURE — 97116 GAIT TRAINING THERAPY: CPT

## 2024-07-19 ASSESSMENT — PAIN DESCRIPTION - LOCATION: LOCATION: LEG

## 2024-07-19 ASSESSMENT — PAIN DESCRIPTION - DESCRIPTORS: DESCRIPTORS: ACHING

## 2024-07-19 ASSESSMENT — PAIN DESCRIPTION - ORIENTATION: ORIENTATION: RIGHT

## 2024-07-19 ASSESSMENT — PAIN SCALES - GENERAL: PAINLEVEL_OUTOF10: 3

## 2024-07-19 NOTE — PROGRESS NOTES
Frequency: 2  Plan weeks: 6  Current Treatment Recommendations: Strengthening, ROM, Balance training, Functional mobility training, Transfer training, Gait training, Stair training, Neuromuscular re-education, Manual, Pain management, Home exercise program, Safety education & training, Patient/Caregiver education & training, Equipment evaluation, education, & procurement, Modalities, Group Therapy  Modalities: Heat/Cold  Pt to continue current HEP.  See objective section for any therapeutic exercise changes, additions or modifications this date.    Therapy Time:      PT Individual Minutes  Time In: 1108  Time Out: 1155  Minutes: 47  Timed Code Treatment Minutes: 47 Minutes  Procedure Minutes:0  Timed Activity Minutes Units   Gt  10 1   Ther Ex 37 2     Electronically signed by Leonila Grant PTA on 7/19/24 at 11:12 AM EDT

## 2024-07-22 RX ORDER — ATORVASTATIN CALCIUM 40 MG/1
40 TABLET, FILM COATED ORAL NIGHTLY
Qty: 30 TABLET | Refills: 3 | OUTPATIENT
Start: 2024-07-22

## 2024-07-23 ENCOUNTER — HOSPITAL ENCOUNTER (OUTPATIENT)
Dept: PHYSICAL THERAPY | Age: 64
Setting detail: THERAPIES SERIES
Discharge: HOME | End: 2024-07-23
Payer: COMMERCIAL

## 2024-07-23 NOTE — PROGRESS NOTES
Therapy                            Cancellation/No-show Note    Date: 2024  Patient: Cande Richey (63 y.o. female)  : 1960  MRN:  [id number]  Referring Physician: Kirsten Aguilera APRN - CNP    Medical Diagnosis: Chronic gout of multiple sites, unspecified cause [M1A.09X0]  Lymphedema [I89.0]  Limited mobility [Z74.09]  Decreased activities of daily living (ADL) [Z78.9]      Visit Information:  Insurance: Payor: UNITED HEALTHCARE / Plan: [employer] PLU / Product Type: *No Product type* /   Visits to Date: 4   No Show/Cancelled Appts: 0 / 3      For today's appointment patient:  [x]  Cancelled  []  Rescheduled appointment  []  No-show   []  Called pt to remind of next appointment     Reason given by patient:  []  Patient ill  []  Conflicting appointment  []  No transportation    []  Conflict with work  []  No reason given  [x]  Other:  having trouble with hands to get dressed    [x] Pt has future appointments scheduled, no follow up needed  [] Pt requests to be on hold.    Reason:   If > 2 weeks please discuss with therapist.  [] Therapist to call pt for follow up     Comments:       Signature: Electronically signed by Em Riggs PTA on 24 at 11:45 AM EDT

## 2024-07-26 ENCOUNTER — HOSPITAL ENCOUNTER (OUTPATIENT)
Dept: PHYSICAL THERAPY | Age: 64
Setting detail: THERAPIES SERIES
Discharge: HOME | End: 2024-07-26
Payer: COMMERCIAL

## 2024-07-26 PROCEDURE — 97110 THERAPEUTIC EXERCISES: CPT

## 2024-07-26 PROCEDURE — 97112 NEUROMUSCULAR REEDUCATION: CPT

## 2024-07-26 PROCEDURE — 97116 GAIT TRAINING THERAPY: CPT

## 2024-07-26 NOTE — PROGRESS NOTES
[facility]  Outpatient Physical Therapy    Treatment Note        Date: 2024  Patient: Cande Richey  : 1960   Confirmed: Yes  MRN: [id number]  Referring Provider: Kirsten Aguilera APRN - CNP    Medical Diagnosis: Chronic gout of multiple sites, unspecified cause [M1A.09X0]  Lymphedema [I89.0]  Limited mobility [Z74.09]  Decreased activities of daily living (ADL) [Z78.9]       Treatment Diagnosis: LE weakness, Impaired mobility    Visit Information:  Insurance: Payor: UNITED HEALTHCARE / Plan: UNITED HEALTHCARE - CHOICE PLU / Product Type: *No Product type* /   PT Visit Information  PT Insurance Information: [facility]  Total # of Visits Approved:  ($20 copay)  Total # of Visits to Date: 5  No Show: 0  Canceled Appointment: 3  Progress Note Counter:     Subjective Information:  Subjective: Pt reports she was able to drive here without her R leg cramping but continues to experience pain getting in and out of the car. Also states \"I'm at the tail end of a gout flare up.\"  HEP Compliance:  [] Good [] Fair [] Poor [x] Reports not doing due to: gout flare up                Pain Screening  Patient Currently in Pain: Yes  Pain Assessment: 0-10  Pain Level: 6  Pain Type: Chronic pain  Pain Location: Leg, Groin  Pain Orientation: Right  Pain Descriptors: Aching    Treatment:  Exercises:  Exercises  Exercise 1: static standing on air ex: 20-30 seconds x3  Exercise 2: trsf into car  Exercise 3: stead hamstring stretch on 6\" block BLE 20\" x2  Exercise 5: Seated open chain:  2# x12, # x12 heel slides, hip abduction with RTB 5s x12ea  Exercise 6: BUE support single step overs SPC fwd x10 , laterally RLE x5 only - increased pain in R knee  Exercise 7: SciFit level 1 x 5 minutes, seat 10  Exercise 10: Standing b/l hip abd , ext. hamstring curls x10 ea  Exercise 11: Standing hip flexor str - unable to complete at this time d/t pain - visual demo of mod jason stretch to trial at home  Exercise 20:

## 2024-07-29 ENCOUNTER — OFFICE VISIT (OUTPATIENT)
Dept: ORTHOPEDIC SURGERY | Age: 64
End: 2024-07-29
Payer: COMMERCIAL

## 2024-07-29 VITALS
BODY MASS INDEX: 44.41 KG/M2 | HEART RATE: 86 BPM | TEMPERATURE: 96.8 F | OXYGEN SATURATION: 96 % | HEIGHT: 68 IN | WEIGHT: 293 LBS

## 2024-07-29 DIAGNOSIS — M17.0 PRIMARY OSTEOARTHRITIS OF BOTH KNEES: Primary | ICD-10-CM

## 2024-07-29 PROCEDURE — G8417 CALC BMI ABV UP PARAM F/U: HCPCS | Performed by: PHYSICIAN ASSISTANT

## 2024-07-29 PROCEDURE — 99214 OFFICE O/P EST MOD 30 MIN: CPT | Performed by: PHYSICIAN ASSISTANT

## 2024-07-29 PROCEDURE — 20610 DRAIN/INJ JOINT/BURSA W/O US: CPT | Performed by: PHYSICIAN ASSISTANT

## 2024-07-29 PROCEDURE — 3017F COLORECTAL CA SCREEN DOC REV: CPT | Performed by: PHYSICIAN ASSISTANT

## 2024-07-29 PROCEDURE — 1036F TOBACCO NON-USER: CPT | Performed by: PHYSICIAN ASSISTANT

## 2024-07-29 PROCEDURE — G8427 DOCREV CUR MEDS BY ELIG CLIN: HCPCS | Performed by: PHYSICIAN ASSISTANT

## 2024-07-29 RX ORDER — TRIAMCINOLONE ACETONIDE 40 MG/ML
80 INJECTION, SUSPENSION INTRA-ARTICULAR; INTRAMUSCULAR ONCE
Status: COMPLETED | OUTPATIENT
Start: 2024-07-29 | End: 2024-07-29

## 2024-07-29 RX ORDER — LIDOCAINE HYDROCHLORIDE 10 MG/ML
8 INJECTION, SOLUTION INFILTRATION; PERINEURAL ONCE
Status: COMPLETED | OUTPATIENT
Start: 2024-07-29 | End: 2024-07-29

## 2024-07-29 RX ADMIN — LIDOCAINE HYDROCHLORIDE 8 ML: 10 INJECTION, SOLUTION INFILTRATION; PERINEURAL at 16:26

## 2024-07-29 RX ADMIN — TRIAMCINOLONE ACETONIDE 80 MG: 40 INJECTION, SUSPENSION INTRA-ARTICULAR; INTRAMUSCULAR at 16:26

## 2024-07-29 ASSESSMENT — ENCOUNTER SYMPTOMS
EYES NEGATIVE: 1
RESPIRATORY NEGATIVE: 1
GASTROINTESTINAL NEGATIVE: 1

## 2024-07-29 NOTE — PROGRESS NOTES
Cande Richey (:  1960) is a 63 y.o. female,Established patient, here for evaluation of the following chief complaint(s):  Follow-up (Bilateral knee OA. Pain is currently 6/10)      Assessment & Plan   1. Primary osteoarthritis of both knees  -     IA ARTHROCENTESIS ASPIR&/INJ MAJOR JT/BURSA W/O US  -     lidocaine 1 % injection 8 mL; 8 mL, Intra-artICUlar, ONCE, 1 dose, On Mon 24 at 1645  -     triamcinolone acetonide (KENALOG-40) injection 80 mg; 80 mg, Intra-artICUlar, ONCE, 1 dose, On Mon 24 at 1645      Return in about 1 week (around 2024) for Opposite joint injection.       Subjective   This is a 63-year-old female following up for complaint of bilateral knee pain.  And injected her knees with cortisone in 2023 and then Visco lubricant 2023.  States she has done well until just recently.  She is going on vacation to [place] and then to [place].  She would like to be able to walk is much as possible.  She presents today requesting injections in her knees.        Review of Systems   Constitutional: Negative.    HENT: Negative.     Eyes: Negative.    Respiratory: Negative.     Gastrointestinal: Negative.    Genitourinary: Negative.    Musculoskeletal: Negative.    Psychiatric/Behavioral: Negative.            Objective   Physical Exam  Constitutional:       Appearance: Normal appearance.   HENT:      Head: Normocephalic and atraumatic.      Mouth/Throat:      Mouth: Mucous membranes are moist.   Eyes:      Extraocular Movements: Extraocular movements intact.   Musculoskeletal:      Cervical back: Normal range of motion.      Comments: Right knee-joint effusion present.  Popliteal cyst present.  No warmth, erythema, fluctuance or sign of infection.  Full extension, flexion is 110 degrees which is symmetrical.  The knee joint is stable, there is varus laxity when the joint is stressed.  Tenderness with palpation of the lateral femoral condyle.  Patella femoral crepitus

## 2024-07-31 ENCOUNTER — OFFICE VISIT (OUTPATIENT)
Dept: FAMILY MEDICINE CLINIC | Age: 64
End: 2024-07-31
Payer: COMMERCIAL

## 2024-07-31 VITALS
BODY MASS INDEX: 44.41 KG/M2 | HEIGHT: 68 IN | WEIGHT: 293 LBS | SYSTOLIC BLOOD PRESSURE: 114 MMHG | TEMPERATURE: 98.3 F | HEART RATE: 92 BPM | DIASTOLIC BLOOD PRESSURE: 70 MMHG | OXYGEN SATURATION: 96 %

## 2024-07-31 DIAGNOSIS — M10.9 ACUTE GOUT OF MULTIPLE SITES, UNSPECIFIED CAUSE: Primary | ICD-10-CM

## 2024-07-31 DIAGNOSIS — E66.01 MORBID OBESITY (HCC): ICD-10-CM

## 2024-07-31 PROCEDURE — 3017F COLORECTAL CA SCREEN DOC REV: CPT | Performed by: NURSE PRACTITIONER

## 2024-07-31 PROCEDURE — 1036F TOBACCO NON-USER: CPT | Performed by: NURSE PRACTITIONER

## 2024-07-31 PROCEDURE — 3074F SYST BP LT 130 MM HG: CPT | Performed by: NURSE PRACTITIONER

## 2024-07-31 PROCEDURE — 99214 OFFICE O/P EST MOD 30 MIN: CPT | Performed by: NURSE PRACTITIONER

## 2024-07-31 PROCEDURE — G8427 DOCREV CUR MEDS BY ELIG CLIN: HCPCS | Performed by: NURSE PRACTITIONER

## 2024-07-31 PROCEDURE — 3078F DIAST BP <80 MM HG: CPT | Performed by: NURSE PRACTITIONER

## 2024-07-31 PROCEDURE — G8417 CALC BMI ABV UP PARAM F/U: HCPCS | Performed by: NURSE PRACTITIONER

## 2024-07-31 RX ORDER — FEBUXOSTAT 80 MG/1
80 TABLET, FILM COATED ORAL DAILY
Qty: 30 TABLET | Refills: 2 | Status: SHIPPED | OUTPATIENT
Start: 2024-07-31

## 2024-07-31 RX ORDER — PREDNISONE 20 MG/1
20 TABLET ORAL 2 TIMES DAILY
Qty: 10 TABLET | Refills: 0 | Status: SHIPPED | OUTPATIENT
Start: 2024-07-31 | End: 2024-08-05

## 2024-07-31 ASSESSMENT — ENCOUNTER SYMPTOMS
SHORTNESS OF BREATH: 0
RESPIRATORY NEGATIVE: 1
BLOOD IN STOOL: 0
WHEEZING: 0

## 2024-07-31 NOTE — PROGRESS NOTES
[facility]  [facility]  [address]  [place] [address]  Dept: [phone]  Dept Fax: [phone]  Loc: [phone]     SUBJECTIVE    Cande Richey (: 1960) is a 63 y.o. female, Established patient, here for evaluation of the following chief complaint(s):  6 week follow up, GI Problem (Had diarrhea and constipation. Wants to start Miralax.), Leg Pain (States that she has her off/on days depending on situation./Had a gout flare but its better now. ), and Weight Management (Semaglutide, has been tolerating well. Aside GI upset. )      PCP:  Kirsten Aguilera APRN - CNP      HPI    Is on second week of 0.5, will be going up to 1.2 after this. Is down 10 pounds in 1 month.     Going on a trip in a couple weeks to [place]- would like gout medication just in case a flare.     Review of Systems   Constitutional: Negative.    Respiratory: Negative.  Negative for shortness of breath and wheezing.    Cardiovascular: Negative.  Negative for palpitations and leg swelling.   Gastrointestinal:  Negative for blood in stool.   Psychiatric/Behavioral: Negative.  Negative for behavioral problems. The patient is not nervous/anxious.        Past Medical History:   Diagnosis Date    Atrial fibrillation (HCC)     Hyperlipidemia     Hypertension     Thyroid disease      Past Surgical History:   Procedure Laterality Date    JOINT REPLACEMENT      STIMULATOR SURGERY N/A 2024    MEDTRONIC STAGE 1 AND STAGE 2 INTERSTIM performed by Sabine Bradford MD at [facility]    THYROIDECTOMY       Social History     Socioeconomic History    Marital status:      Spouse name: Not on file    Number of children: Not on file    Years of education: Not on file    Highest education level: Not on file   Occupational History    Not on file   Tobacco Use    Smoking status: Never     Passive exposure: Never    Smokeless tobacco: Never

## 2024-08-01 ENCOUNTER — HOSPITAL ENCOUNTER (OUTPATIENT)
Dept: PHYSICAL THERAPY | Age: 64
Setting detail: THERAPIES SERIES
Discharge: HOME | End: 2024-08-01
Payer: COMMERCIAL

## 2024-08-01 PROCEDURE — 97112 NEUROMUSCULAR REEDUCATION: CPT

## 2024-08-01 ASSESSMENT — PAIN DESCRIPTION - ORIENTATION: ORIENTATION: LEFT

## 2024-08-01 ASSESSMENT — PAIN SCALES - GENERAL: PAINLEVEL_OUTOF10: 4

## 2024-08-01 ASSESSMENT — PAIN DESCRIPTION - LOCATION: LOCATION: HIP

## 2024-08-01 NOTE — PROGRESS NOTES
[facility]  Outpatient Physical Therapy    Treatment Note        Date: 2024  Patient: Cande Richey  : 1960   Confirmed: Yes  MRN: [id number]  Referring Provider: Kirsten Aguilera APRN - CNP    Medical Diagnosis: Chronic gout of multiple sites, unspecified cause [M1A.09X0]  Lymphedema [I89.0]  Limited mobility [Z74.09]  Decreased activities of daily living (ADL) [Z78.9]       Treatment Diagnosis: LE weakness, Impaired mobility    Visit Information:  Insurance: Payor: UNITED HEALTHCARE / Plan: UNITED HEALTHCARE - CHOICE PLU / Product Type: *No Product type* /   PT Visit Information  PT Insurance Information: [facility]  Total # of Visits Approved:  ($20 copay)  Total # of Visits to Date: 6  No Show: 0  Canceled Appointment: 3  Progress Note Counter:     Subjective Information:  Subjective: Pt reports able to use cane more in home.  Pt reports aide at docotr's office noticed an improvement with walking.  States still having weakness in Rt hip flexor.  Had a cortisone shot in Lt knee on Monday and will go for one in the Rt knee next Monday.  Getting lubricant added to Lt knee also on next Monday and Rt knee will be on Thursday.  Leaves for trip next Saturday and will be gone for a week.  HEP Compliance:  [x] Good [] Fair [] Poor [] Reports not doing due to:      Pain Screening  Patient Currently in Pain: Yes  Pain Assessment: 0-10  Pain Level: 4  Pain Location: Hip  Pain Orientation: Left    Treatment:  Exercises:  Exercises  Exercise 3: Seated hams str with 4\" box 30s x2 parul  Exercise 4: Seated heel taps onto 4\" box x10 parul  Exercise 5: Seated open chain: seated hip IR/ER from elevated surface x10 on Rt  Exercise 8: Gait drills: F focusing on heel strike, step length and no UEs/L/R x2 laps ea  Exercise 12: Standing 4\" alt toe taps x10 parul     Objective Measures:   Strength: [] NT  [x] MMT completed:  Strength RLE  R Hip Flexion: 3+/5  R Knee Flexion: 5/5  R Knee Extension: 5/5  R Ankle

## 2024-08-05 ENCOUNTER — OFFICE VISIT (OUTPATIENT)
Dept: ORTHOPEDIC SURGERY | Age: 64
End: 2024-08-05
Payer: COMMERCIAL

## 2024-08-05 VITALS
BODY MASS INDEX: 44.41 KG/M2 | OXYGEN SATURATION: 100 % | HEART RATE: 65 BPM | HEIGHT: 68 IN | WEIGHT: 293 LBS | TEMPERATURE: 97.3 F

## 2024-08-05 DIAGNOSIS — M17.0 PRIMARY OSTEOARTHRITIS OF BOTH KNEES: Primary | ICD-10-CM

## 2024-08-05 PROCEDURE — 20610 DRAIN/INJ JOINT/BURSA W/O US: CPT | Performed by: PHYSICIAN ASSISTANT

## 2024-08-05 PROCEDURE — 90000 NO LOS: CPT | Performed by: PHYSICIAN ASSISTANT

## 2024-08-05 RX ORDER — TRIAMCINOLONE ACETONIDE 40 MG/ML
80 INJECTION, SUSPENSION INTRA-ARTICULAR; INTRAMUSCULAR ONCE
Status: COMPLETED | OUTPATIENT
Start: 2024-08-05 | End: 2024-08-05

## 2024-08-05 RX ORDER — LIDOCAINE HYDROCHLORIDE 10 MG/ML
8 INJECTION, SOLUTION INFILTRATION; PERINEURAL ONCE
Status: COMPLETED | OUTPATIENT
Start: 2024-08-05 | End: 2024-08-05

## 2024-08-05 RX ADMIN — LIDOCAINE HYDROCHLORIDE 8 ML: 10 INJECTION, SOLUTION INFILTRATION; PERINEURAL at 16:30

## 2024-08-05 RX ADMIN — TRIAMCINOLONE ACETONIDE 80 MG: 40 INJECTION, SUSPENSION INTRA-ARTICULAR; INTRAMUSCULAR at 16:29

## 2024-08-05 NOTE — PROGRESS NOTES
Time Out  [x] Patient Verified  [x] Site Verified  [x] Laterality Verified  [x] Procedure Verified    Before aspiration/injection risks/benefits of a cortisone  injection including infection, local skin irritation, skin atrophy, continued pain/discomfort, elevated blood sugar, burning, failure to relieve pain, possible late infection were discussed with patient.   Patient verbalized understanding and wanted to proceed with planned procedure.    After prepping and draping the right knee in the usual sterile fashion, 2cc 40mg Kenalog and 8cc Lidocaine were injected intra-articularly after aspirating 8cc clear yellow joint fluid without any complications.  Patient tolerated this well.    Post-procedure discomfort can be alleviated with additional medications/ice/elevation/rest over the first 24 hours as recommended.     The patient tolerated the procedure well.    If fluid was aspirated that was abnormal it was sent for further studies  in sterile specimen container as ordered to the lab     Diagnosis Orders   1. Primary osteoarthritis of both knees  MA ARTHROCENTESIS ASPIR&/INJ MAJOR JT/BURSA W/O US    lidocaine 1 % injection 8 mL    triamcinolone acetonide (KENALOG-40) injection 80 mg        Time Out  [x] Patient Verified  [x] Site Verified  [x] Laterality Verified  [x] Procedure Verified    Before aspiration/injection risks/benefits of a Viscolubricant injection including infection, local skin irritation, skin atrophy, continued pain/discomfort, elevated blood sugar, burning, failure to relieve pain, possible late infection were discussed with patient.   Patient verbalized understanding and wanted to proceed with planned procedure.    After prepping and draping the left knee in the usual sterile fashion,  3 cc Durolane  were injected intra-articularly after aspirating 2 clear yellow joint fluid without any complications.  Patient tolerated this well.    Post-procedure discomfort can be alleviated with additional

## 2024-08-06 ENCOUNTER — HOSPITAL ENCOUNTER (OUTPATIENT)
Dept: PHYSICAL THERAPY | Age: 64
Setting detail: THERAPIES SERIES
Discharge: HOME | End: 2024-08-06
Payer: COMMERCIAL

## 2024-08-06 NOTE — PROGRESS NOTES
Therapy                            Cancellation/No-show Note    Date: 2024  Patient: Cande Richey (63 y.o. female)  : 1960  MRN:  [id number]  Referring Physician: Kirsten Aguilera APRN - CNP    Medical Diagnosis: Chronic gout of multiple sites, unspecified cause [M1A.09X0]  Lymphedema [I89.0]  Limited mobility [Z74.09]  Decreased activities of daily living (ADL) [Z78.9]      Visit Information:  Insurance: Payor: UNITED HEALTHCARE / Plan: [employer] PLU / Product Type: *No Product type* /   Visits to Date: 6   No Show/Cancelled Appts: 0 / 3      For today's appointment patient:  [x]  Cancelled  []  Rescheduled appointment  []  No-show   []  Called pt to remind of next appointment     Reason given by patient:  []  Patient ill  []  Conflicting appointment  []  No transportation    []  Conflict with work  []  No reason given  [x]  Other:  Family member in hospital    [x] Pt has future appointments scheduled, no follow up needed  [] Pt requests to be on hold.    Reason:   If > 2 weeks please discuss with therapist.  [] Therapist to call pt for follow up     Comments:       Signature: Electronically signed by Rosanne Haynes PTA on 24 at 7:59 AM EDT

## 2024-08-09 ENCOUNTER — OFFICE VISIT (OUTPATIENT)
Dept: ORTHOPEDIC SURGERY | Age: 64
End: 2024-08-09

## 2024-08-09 ENCOUNTER — HOSPITAL ENCOUNTER (OUTPATIENT)
Dept: PHYSICAL THERAPY | Age: 64
Setting detail: THERAPIES SERIES
Discharge: HOME | End: 2024-08-09
Payer: COMMERCIAL

## 2024-08-09 VITALS
HEIGHT: 67 IN | OXYGEN SATURATION: 96 % | WEIGHT: 293 LBS | BODY MASS INDEX: 45.99 KG/M2 | HEART RATE: 90 BPM | TEMPERATURE: 97.2 F

## 2024-08-09 DIAGNOSIS — M17.0 PRIMARY OSTEOARTHRITIS OF BOTH KNEES: Primary | ICD-10-CM

## 2024-08-09 NOTE — PROGRESS NOTES
Therapy                            Cancellation/No-show Note    Date: 2024  Patient: Cande Richey (63 y.o. female)  : 1960  MRN:  [id number]  Referring Physician: Kirsten Aguilera APRN - CNP    Medical Diagnosis: Chronic gout of multiple sites, unspecified cause [M1A.09X0]  Lymphedema [I89.0]  Limited mobility [Z74.09]  Decreased activities of daily living (ADL) [Z78.9]      Visit Information:  Insurance: Payor: UNITED HE[employer]CARE / Plan: [employer] PLU / Product Type: *No Product type* /   Visits to Date: 6   No Show/Cancelled Appts: 0       For today's appointment patient:  [x]  Cancelled  []  Rescheduled appointment  []  No-show   []  Called pt to remind of next appointment     Reason given by patient:  []  Patient ill  []  Conflicting appointment  []  No transportation    []  Conflict with work  []  No reason given  [x]  Other:  Family in hospital    [x] Pt has future appointments scheduled, no follow up needed  [] Pt requests to be on hold.    Reason:   If > 2 weeks please discuss with therapist.  [] Therapist to call pt for follow up     Comments:       Signature: Electronically signed by Rosanne Haynes PTA on 24 at 9:12 AM EDT

## 2024-08-09 NOTE — PROGRESS NOTES
Time Out  [x] Patient Verified  [x] Site Verified  [x] Laterality Verified  [x] Procedure Verified    Before aspiration/injection risks/benefits of a Viscolubricant injection including infection, local skin irritation, skin atrophy, continued pain/discomfort, elevated blood sugar, burning, failure to relieve pain, possible late infection were discussed with patient.   Patient verbalized understanding and wanted to proceed with planned procedure.    After prepping and draping the right knee in the usual sterile fashion,  3 cc Durolane  were injected intra-articularly after aspirating 8 cc clear yellow joint fluid without any complications.  Patient tolerated this well.    Post-procedure discomfort can be alleviated with additional medications/ice/elevation/rest over the first 24 hours as recommended.     The patient tolerated the procedure well.    If fluid was aspirated that was abnormal it was sent for further studies  in sterile specimen container as ordered to the lab     Diagnosis Orders   1. Primary osteoarthritis of both knees  IN ARTHROCENTESIS ASPIR&/INJ MAJOR JT/BURSA W/O US    sodium hyaluronate (DUROLANE) injection PRSY 60 mg

## 2024-08-20 ENCOUNTER — HOSPITAL ENCOUNTER (OUTPATIENT)
Dept: PHYSICAL THERAPY | Age: 64
Setting detail: THERAPIES SERIES
Discharge: HOME | End: 2024-08-20
Payer: COMMERCIAL

## 2024-08-20 NOTE — PROGRESS NOTES
Therapy                            Cancellation/No-show Note    Date: 2024  Patient: Cande Richey (64 y.o. female)  : 1960  MRN:  [id number]  Referring Physician: Kirsten Aguilera APRN - CNP    Medical Diagnosis: Chronic gout of multiple sites, unspecified cause [M1A.09X0]  Lymphedema [I89.0]  Limited mobility [Z74.09]  Decreased activities of daily living (ADL) [Z78.9]      Visit Information:  Insurance: Payor: UNITED HEALTHCARE / Plan: [employer] PLU / Product Type: *No Product type* /   Visits to Date: 6   No Show/Cancelled Appts: 0       For today's appointment patient:  [x]  Cancelled  []  Rescheduled appointment  []  No-show   []  Called pt to remind of next appointment     Reason given by patient:  [x]  Patient ill  []  Conflicting appointment  []  No transportation    []  Conflict with work  []  No reason given  [x]  Other:  Pt hasn't been seen since 24    [x] Pt has future appointments scheduled, no follow up needed  [] Pt requests to be on hold.    Reason:   If > 2 weeks please discuss with therapist.  [] Therapist to call pt for follow up  [] [place] to call pt to reschedule   Comments:       Signature: Electronically signed by Rosanne Haynes PTA on 24 at 9:08 AM EDT

## 2024-08-21 RX ORDER — FESOTERODINE FUMARATE 8 MG/1
1 TABLET, FILM COATED, EXTENDED RELEASE ORAL DAILY
Qty: 30 TABLET | Refills: 3 | OUTPATIENT
Start: 2024-08-21

## 2024-08-22 ENCOUNTER — PATIENT MESSAGE (OUTPATIENT)
Dept: OBGYN CLINIC | Age: 64
End: 2024-08-22

## 2024-08-23 RX ORDER — NITROFURANTOIN 25; 75 MG/1; MG/1
100 CAPSULE ORAL 2 TIMES DAILY
Qty: 20 CAPSULE | Refills: 0 | Status: SHIPPED | OUTPATIENT
Start: 2024-08-23 | End: 2024-09-02

## 2024-08-28 ENCOUNTER — HOSPITAL ENCOUNTER (OUTPATIENT)
Dept: PHYSICAL THERAPY | Age: 64
Setting detail: THERAPIES SERIES
Discharge: HOME | End: 2024-08-28
Payer: COMMERCIAL

## 2024-08-28 DIAGNOSIS — U07.1 COVID-19: Primary | ICD-10-CM

## 2024-08-28 NOTE — PROGRESS NOTES
Therapy                            Cancellation/No-show Note    Date: 2024  Patient: Cande Richey (64 y.o. female)  : 1960  MRN:  [id number]  Referring Physician: Kirsten Aguilera APRN - CNP    Medical Diagnosis: Chronic gout of multiple sites, unspecified cause [M1A.09X0]  Lymphedema [I89.0]  Limited mobility [Z74.09]  Decreased activities of daily living (ADL) [Z78.9]      Visit Information:  Insurance: Payor: UNITED HEALTHCARE / Plan: [email or web address] PLU / Product Type: *No Product type* /   Visits to Date: 6   No Show/Cancelled Appts:       For today's appointment patient:  [x]  Cancelled  []  Rescheduled appointment  []  No-show   []  Called pt to remind of next appointment     Reason given by patient:  [x]  Patient ill  []  Conflicting appointment  []  No transportation    []  Conflict with work  []  No reason given  []  Other:      [] Pt has future appointments scheduled, no follow up needed  [] Pt requests to be on hold.    Reason:   If > 2 weeks please discuss with therapist.  [x] Therapist to call pt for follow up  []  to call pt to reschedule   Comments:       Signature: Electronically signed by Jesica Doe PT on 24 at 10:48 AM EDT

## 2024-09-03 RX ORDER — FESOTERODINE FUMARATE 8 MG/1
1 TABLET, FILM COATED, EXTENDED RELEASE ORAL DAILY
Qty: 30 TABLET | Refills: 3 | OUTPATIENT
Start: 2024-09-03

## 2024-09-17 DIAGNOSIS — R11.2 NAUSEA AND VOMITING, UNSPECIFIED VOMITING TYPE: Primary | ICD-10-CM

## 2024-09-17 RX ORDER — ONDANSETRON 4 MG/1
4 TABLET, ORALLY DISINTEGRATING ORAL 3 TIMES DAILY PRN
Qty: 21 TABLET | Refills: 0 | Status: SHIPPED | OUTPATIENT
Start: 2024-09-17

## 2024-09-18 ENCOUNTER — OFFICE VISIT (OUTPATIENT)
Dept: OBGYN CLINIC | Age: 64
End: 2024-09-18
Payer: COMMERCIAL

## 2024-09-18 VITALS
DIASTOLIC BLOOD PRESSURE: 78 MMHG | WEIGHT: 293 LBS | SYSTOLIC BLOOD PRESSURE: 118 MMHG | HEIGHT: 67 IN | BODY MASS INDEX: 45.99 KG/M2 | HEART RATE: 88 BPM

## 2024-09-18 DIAGNOSIS — N39.0 RECURRENT UTI: ICD-10-CM

## 2024-09-18 DIAGNOSIS — N39.41 URGE INCONTINENCE: Primary | ICD-10-CM

## 2024-09-18 LAB
BACTERIA URNS QL MICRO: ABNORMAL /HPF
BILIRUB UR QL STRIP: ABNORMAL
CLARITY UR: ABNORMAL
COLOR UR: ABNORMAL
EPI CELLS #/AREA URNS AUTO: >100 /HPF (ref 0–5)
GLUCOSE UR STRIP-MCNC: NEGATIVE MG/DL
HGB UR QL STRIP: NEGATIVE
KETONES UR STRIP-MCNC: ABNORMAL MG/DL
LEUKOCYTE ESTERASE UR QL STRIP: ABNORMAL
NITRITE UR QL STRIP: NEGATIVE
PH UR STRIP: 5.5 [PH] (ref 5–9)
PROT UR STRIP-MCNC: ABNORMAL MG/DL
RBC #/AREA URNS HPF: ABNORMAL /HPF (ref 0–2)
SP GR UR STRIP: 1.02 (ref 1–1.03)
UROBILINOGEN UR STRIP-ACNC: 1 E.U./DL
WBC #/AREA URNS AUTO: ABNORMAL /HPF (ref 0–5)

## 2024-09-18 PROCEDURE — 3074F SYST BP LT 130 MM HG: CPT | Performed by: OBSTETRICS & GYNECOLOGY

## 2024-09-18 PROCEDURE — G8417 CALC BMI ABV UP PARAM F/U: HCPCS | Performed by: OBSTETRICS & GYNECOLOGY

## 2024-09-18 PROCEDURE — 3017F COLORECTAL CA SCREEN DOC REV: CPT | Performed by: OBSTETRICS & GYNECOLOGY

## 2024-09-18 PROCEDURE — 1036F TOBACCO NON-USER: CPT | Performed by: OBSTETRICS & GYNECOLOGY

## 2024-09-18 PROCEDURE — G8427 DOCREV CUR MEDS BY ELIG CLIN: HCPCS | Performed by: OBSTETRICS & GYNECOLOGY

## 2024-09-18 PROCEDURE — 99213 OFFICE O/P EST LOW 20 MIN: CPT | Performed by: OBSTETRICS & GYNECOLOGY

## 2024-09-18 PROCEDURE — 3078F DIAST BP <80 MM HG: CPT | Performed by: OBSTETRICS & GYNECOLOGY

## 2024-09-18 RX ORDER — FESOTERODINE FUMARATE 8 MG/1
1 TABLET, FILM COATED, EXTENDED RELEASE ORAL DAILY
Qty: 30 TABLET | Refills: 5 | Status: SHIPPED | OUTPATIENT
Start: 2024-09-18

## 2024-09-19 DIAGNOSIS — I89.0 LYMPHEDEMA: ICD-10-CM

## 2024-09-19 LAB — BACTERIA UR CULT: NORMAL

## 2024-09-19 RX ORDER — SPIRONOLACTONE 25 MG/1
25 TABLET ORAL DAILY
Qty: 90 TABLET | Refills: 0 | Status: SHIPPED | OUTPATIENT
Start: 2024-09-19

## 2024-09-24 RX ORDER — FUROSEMIDE 20 MG
20 TABLET ORAL DAILY
Qty: 90 TABLET | Refills: 1 | Status: SHIPPED | OUTPATIENT
Start: 2024-09-24

## 2024-10-02 DIAGNOSIS — I89.0 LYMPHEDEMA: ICD-10-CM

## 2024-10-02 RX ORDER — SPIRONOLACTONE 25 MG/1
25 TABLET ORAL DAILY
Qty: 90 TABLET | Refills: 0 | OUTPATIENT
Start: 2024-10-02

## 2024-10-15 ENCOUNTER — TELEPHONE (OUTPATIENT)
Dept: FAMILY MEDICINE CLINIC | Age: 64
End: 2024-10-15

## 2024-10-15 DIAGNOSIS — R19.7 DIARRHEA OF PRESUMED INFECTIOUS ORIGIN: Primary | ICD-10-CM

## 2024-10-15 NOTE — TELEPHONE ENCOUNTER
----- Message from Geno PINO sent at 10/15/2024  9:10 AM EDT -----  Regarding: ECC Appointment Request  ECC Appointment Request    Patient needs appointment for ECC Appointment Type: Acute Care    Patient Requested Dates(s): As soon as possible   Patient Requested Time: Any time   Provider Name: Kirsten Aguilera, TRAE - JUNITO    Reason for Appointment Request: Other       Additional Information: The patient called in because patient wants to reschedule her appointment for today because she is having a diarrhea. But there is no available schedule so we cancel the appointment for today October 15, 202 at 10:15 am  --------------------------------------------------------------------------------------------------------------------------    Relationship to Patient: Self     Call Back Information: OK to leave message on voicemail  Preferred Call Back Number: Phone [phone]

## 2024-10-15 NOTE — TELEPHONE ENCOUNTER
Spoke to pt.  Pt stated that she has been having diarreah on/off for the last 2 weeks. She has been having nausea (no vomiting) for the last 4 weeks or so. She had covid a few weeks ago prior to starting to feel ill (GI issues).    She was provided the times/days for the walk in and that this information was going to be forwarded to PCP.

## 2024-10-15 NOTE — TELEPHONE ENCOUNTER
Pt was notified of placed orders. She will be picking up stool collection kit sometime this week.

## 2024-10-19 LAB
REASON FOR REJECTION: NORMAL
REASON FOR REJECTION: NORMAL
REJECTED TEST: NORMAL
REJECTED TEST: NORMAL

## 2024-10-21 ENCOUNTER — TELEPHONE (OUTPATIENT)
Dept: FAMILY MEDICINE CLINIC | Age: 64
End: 2024-10-21

## 2024-10-21 NOTE — TELEPHONE ENCOUNTER
[facility] called to report that both c-diff and gastro panel have been rejected. They did not receive a specimen.      Thank you.

## 2024-10-22 NOTE — TELEPHONE ENCOUNTER
Requesting medication refill. Please approve or deny this request.    Rx requested:  Requested Prescriptions     Pending Prescriptions Disp Refills    apixaban (ELIQUIS) 5 MG TABS tablet 60 tablet 3     Sig: Take 1 tablet by mouth 2 times daily Indications: Preventative Treatment         Last Office Visit:   6/20/2024      Next Visit Date:  Future Appointments   Date Time Provider Department Center   10/24/2024  1:30 PM Kirsten Aguilera, APRN - CNP [facility]   11/15/2024  9:30 AM Mihai Ramirez PA [facility]   6/26/2025  1:00 PM Beverley Adam DO [facility]

## 2024-10-24 ENCOUNTER — OFFICE VISIT (OUTPATIENT)
Dept: FAMILY MEDICINE CLINIC | Age: 64
End: 2024-10-24
Payer: COMMERCIAL

## 2024-10-24 VITALS
WEIGHT: 293 LBS | HEIGHT: 67 IN | OXYGEN SATURATION: 98 % | BODY MASS INDEX: 45.99 KG/M2 | DIASTOLIC BLOOD PRESSURE: 68 MMHG | HEART RATE: 75 BPM | TEMPERATURE: 97.5 F | SYSTOLIC BLOOD PRESSURE: 122 MMHG

## 2024-10-24 DIAGNOSIS — M1A.09X0 CHRONIC GOUT OF MULTIPLE SITES, UNSPECIFIED CAUSE: ICD-10-CM

## 2024-10-24 DIAGNOSIS — I10 PRIMARY HYPERTENSION: Primary | ICD-10-CM

## 2024-10-24 DIAGNOSIS — I48.0 PAROXYSMAL ATRIAL FIBRILLATION (HCC): ICD-10-CM

## 2024-10-24 DIAGNOSIS — I89.0 LYMPHEDEMA: ICD-10-CM

## 2024-10-24 DIAGNOSIS — E03.9 HYPOTHYROIDISM, UNSPECIFIED TYPE: ICD-10-CM

## 2024-10-24 DIAGNOSIS — R42 LIGHTHEADED: ICD-10-CM

## 2024-10-24 DIAGNOSIS — M10.9 ACUTE GOUT OF MULTIPLE SITES, UNSPECIFIED CAUSE: ICD-10-CM

## 2024-10-24 LAB
ALBUMIN SERPL-MCNC: 3.8 G/DL (ref 3.5–4.6)
ALP SERPL-CCNC: 82 U/L (ref 40–130)
ALT SERPL-CCNC: <5 U/L (ref 0–33)
ANION GAP SERPL CALCULATED.3IONS-SCNC: 13 MEQ/L (ref 9–15)
AST SERPL-CCNC: 11 U/L (ref 0–35)
BILIRUB SERPL-MCNC: 0.4 MG/DL (ref 0.2–0.7)
BUN SERPL-MCNC: 45 MG/DL (ref 8–23)
CALCIUM SERPL-MCNC: 10 MG/DL (ref 8.5–9.9)
CHLORIDE SERPL-SCNC: 103 MEQ/L (ref 95–107)
CO2 SERPL-SCNC: 23 MEQ/L (ref 20–31)
CREAT SERPL-MCNC: 3.4 MG/DL (ref 0.5–0.9)
GLOBULIN SER CALC-MCNC: 3.3 G/DL (ref 2.3–3.5)
GLUCOSE SERPL-MCNC: 110 MG/DL (ref 70–99)
POTASSIUM SERPL-SCNC: 5.5 MEQ/L (ref 3.4–4.9)
PROT SERPL-MCNC: 7.1 G/DL (ref 6.3–8)
SODIUM SERPL-SCNC: 139 MEQ/L (ref 135–144)
TSH REFLEX: 0.88 UIU/ML (ref 0.44–3.86)
URATE SERPL-MCNC: 5.1 MG/DL (ref 2.4–5.7)

## 2024-10-24 PROCEDURE — 3078F DIAST BP <80 MM HG: CPT | Performed by: NURSE PRACTITIONER

## 2024-10-24 PROCEDURE — 99214 OFFICE O/P EST MOD 30 MIN: CPT | Performed by: NURSE PRACTITIONER

## 2024-10-24 PROCEDURE — 3017F COLORECTAL CA SCREEN DOC REV: CPT | Performed by: NURSE PRACTITIONER

## 2024-10-24 PROCEDURE — 1036F TOBACCO NON-USER: CPT | Performed by: NURSE PRACTITIONER

## 2024-10-24 PROCEDURE — G8427 DOCREV CUR MEDS BY ELIG CLIN: HCPCS | Performed by: NURSE PRACTITIONER

## 2024-10-24 PROCEDURE — G8484 FLU IMMUNIZE NO ADMIN: HCPCS | Performed by: NURSE PRACTITIONER

## 2024-10-24 PROCEDURE — G8417 CALC BMI ABV UP PARAM F/U: HCPCS | Performed by: NURSE PRACTITIONER

## 2024-10-24 PROCEDURE — 3074F SYST BP LT 130 MM HG: CPT | Performed by: NURSE PRACTITIONER

## 2024-10-24 RX ORDER — SPIRONOLACTONE 25 MG/1
25 TABLET ORAL DAILY
Qty: 90 TABLET | Refills: 1 | Status: SHIPPED | OUTPATIENT
Start: 2024-10-24

## 2024-10-24 SDOH — ECONOMIC STABILITY: FOOD INSECURITY: WITHIN THE PAST 12 MONTHS, THE FOOD YOU BOUGHT JUST DIDN'T LAST AND YOU DIDN'T HAVE MONEY TO GET MORE.: NEVER TRUE

## 2024-10-24 SDOH — ECONOMIC STABILITY: TRANSPORTATION INSECURITY
IN THE PAST 12 MONTHS, HAS LACK OF TRANSPORTATION KEPT YOU FROM MEETINGS, WORK, OR FROM GETTING THINGS NEEDED FOR DAILY LIVING?: YES

## 2024-10-24 SDOH — ECONOMIC STABILITY: FOOD INSECURITY: WITHIN THE PAST 12 MONTHS, YOU WORRIED THAT YOUR FOOD WOULD RUN OUT BEFORE YOU GOT MONEY TO BUY MORE.: NEVER TRUE

## 2024-10-24 SDOH — ECONOMIC STABILITY: INCOME INSECURITY: HOW HARD IS IT FOR YOU TO PAY FOR THE VERY BASICS LIKE FOOD, HOUSING, MEDICAL CARE, AND HEATING?: NOT HARD AT ALL

## 2024-10-24 ASSESSMENT — ENCOUNTER SYMPTOMS
RESPIRATORY NEGATIVE: 1
COUGH: 0
WHEEZING: 0
BLOOD IN STOOL: 0
SHORTNESS OF BREATH: 0

## 2024-10-24 NOTE — PROGRESS NOTES
[facility]  [facility]  [address]  [place] [address]  Dept: [phone]  Dept Fax: [phone]  Loc: [phone]     SUBJECTIVE    Cande Richey (: 1960) is a 64 y.o. female, Established patient, here for evaluation of the following chief complaint(s):  Follow-up and Medication Check (States that Ozempic is not working for her. She states that this is what probably gave her all the GI problems (nausea/diarrhea). Has not taken the shot for 2 weeks and the nausea and diarrhea has stopped )      PCP:  Kirsten Aguilera APRN - CNP      HPI  Patient is here for follow up on hypertension.    Are you compliant with your medications? Yes  Are you having difficulty affording your medications? No  Do you have side effects from the medication? No  Do you have any of the following symptoms?  Chest Pain? No  Palpitations? No  PARDO/SOB? No  Headache? No  Peripheral Edema? No  Light Headedness? No    Last labs:  Lab Results   Component Value Date/Time     2023 06:10 AM    K 4.4 2023 06:10 AM    K 3.9 2023 05:30 AM     2023 06:10 AM    CO2 25 2023 06:10 AM    BUN 29 2023 06:10 AM    CREATININE 1.00 2023 06:10 AM    GLUCOSE 72 2023 06:10 AM    CALCIUM 9.7 2023 06:10 AM       Lab Results   Component Value Date    CHOL 153 2024     Lab Results   Component Value Date    TRIG 151 (H) 2024     Lab Results   Component Value Date    HDL 38 (L) 2024     No components found for: \"LDLCHOLESTEROL\", \"LDLCALC\"  No results found for: \"VLDL\"  No results found for: \"CHOLHDLRATIO\"       Gout:  Stable- no issues currently      Weight Management:  Stopped ozempic and GI symptoms went away    Wt Readings from Last 3 Encounters:   10/24/24 (!) 142 kg (313 lb)   24 (!) 142 kg (313 lb)   24 (!) 143.8 kg (317 lb)       Sleeping well: Yes,

## 2024-10-24 NOTE — PATIENT INSTRUCTIONS
[place] [employer]*  (Call [employer]/211 if need more resources.)        MEDICAL:    [facility]   What they offer: LLCH&D discounts fees for qualifying insured and uninsured persons.  We can assist you in signing up for Medicaid, Medicare, and Marketplace Exchange insurance plans.  They offer 4 locations in [place], 2 locations in [place] and 1 in [facility].    Phone Number: [phone]  Website: [email or web address]    [facility]:  What they offer: We provide health care services to the uninsured and underinsured. From providing quality care to connecting patients to critical community resources, our patients and their needs are our highest priority.  Phone number: [phone]  Website: https://[employer]   [facility] Financial Assistance:  What they offer: Assistance with medical bills  Phone number: 1-449.450.4767 option 5    UTILITY:         [employer]:  What they offer: Help find lower cost options for phone or internet as well as help paying utility bills.   Phone Number: 211  Website: https://Xenome/get-help/utilities-expenses   [facility]  What they offer:  Assistance with utilities  Phone:  136.689.8543    [facility]   What they Offer: Assistance with utilities  Phone: 318.222.5942  Website: https://www.Proxly.[employer]IRE[employer] international/    [facility] [place]  What they Offer: Assistance with utilities  Phone: 734.953.5795  Website: https://Sun[employer] Corporation[employer]aloGreenstack.org/    MEDICATIONS:   [facility]  What they offer: Good Rx tracks prescription drug prices and provides free drug coupons for discounts on medications.  Website: https://www.Criteo/  [facility]:  What they offer: [employer] offers free information on medications and healthcare cost savings programs including prescription assistance programs, coupons, and discount programs.  Helpline: 230.588.5254  Website: https://www.needPepperweed Consultingeds.org  [employer] Assist:  What they offer: Medication

## 2024-10-25 LAB
VITAMIN B-12: 401 PG/ML (ref 232–1245)
VITAMIN D 25-HYDROXY: 24.2 NG/ML (ref 30–100)

## 2024-10-29 DIAGNOSIS — N18.5 CHRONIC KIDNEY DISEASE (CKD) STAGE G5/A3, GLOMERULAR FILTRATION RATE (GFR) LESS THAN OR EQUAL TO 15 ML/MIN/1.73 SQUARE METER AND ALBUMINURIA CREATININE RATIO GREATER THAN 300 MG/G (HCC): Primary | ICD-10-CM

## 2024-11-04 ENCOUNTER — PATIENT MESSAGE (OUTPATIENT)
Dept: CARDIOLOGY CLINIC | Age: 64
End: 2024-11-04

## 2024-11-11 DIAGNOSIS — N18.5 CHRONIC KIDNEY DISEASE (CKD) STAGE G5/A3, GLOMERULAR FILTRATION RATE (GFR) LESS THAN OR EQUAL TO 15 ML/MIN/1.73 SQUARE METER AND ALBUMINURIA CREATININE RATIO GREATER THAN 300 MG/G (HCC): ICD-10-CM

## 2024-11-11 DIAGNOSIS — E03.9 HYPOTHYROIDISM, UNSPECIFIED TYPE: ICD-10-CM

## 2024-11-11 LAB
ALBUMIN SERPL-MCNC: 3.8 G/DL (ref 3.5–4.6)
ALP SERPL-CCNC: 86 U/L (ref 40–130)
ALT SERPL-CCNC: <5 U/L (ref 0–33)
ANION GAP SERPL CALCULATED.3IONS-SCNC: 10 MEQ/L (ref 9–15)
AST SERPL-CCNC: 14 U/L (ref 0–35)
BILIRUB SERPL-MCNC: 0.3 MG/DL (ref 0.2–0.7)
BUN SERPL-MCNC: 31 MG/DL (ref 8–23)
CALCIUM SERPL-MCNC: 10.1 MG/DL (ref 8.5–9.9)
CHLORIDE SERPL-SCNC: 105 MEQ/L (ref 95–107)
CO2 SERPL-SCNC: 25 MEQ/L (ref 20–31)
CREAT SERPL-MCNC: 1.58 MG/DL (ref 0.5–0.9)
GLOBULIN SER CALC-MCNC: 3.1 G/DL (ref 2.3–3.5)
GLUCOSE SERPL-MCNC: 92 MG/DL (ref 70–99)
MAGNESIUM SERPL-MCNC: 2.1 MG/DL (ref 1.7–2.4)
POTASSIUM SERPL-SCNC: 5.1 MEQ/L (ref 3.4–4.9)
PROT SERPL-MCNC: 6.9 G/DL (ref 6.3–8)
SODIUM SERPL-SCNC: 140 MEQ/L (ref 135–144)

## 2024-11-11 NOTE — TELEPHONE ENCOUNTER
Future Appointments    Encounter Information   Provider Department Appt Notes   11/15/2024 Mihai Ramirez, PA [facility] Orth[facility]ics and Sports Medicine bilateral knee injections   6/26/2025 Beverley Adam DO [facility] Cardiology 1 year f/u     Past Visits    Date Provider Specialty Visit Type Primary Dx   10/24/2024 Kirsten Aguilera P, APRN - CNP Family Medicine Office Visit Primary hypertensio

## 2024-11-12 DIAGNOSIS — N18.32 STAGE 3B CHRONIC KIDNEY DISEASE (HCC): Primary | ICD-10-CM

## 2024-11-12 RX ORDER — LEVOTHYROXINE SODIUM 200 UG/1
200 TABLET ORAL DAILY
Qty: 90 TABLET | Refills: 2 | Status: SHIPPED | OUTPATIENT
Start: 2024-11-12

## 2024-11-12 RX ORDER — ATORVASTATIN CALCIUM 40 MG/1
40 TABLET, FILM COATED ORAL NIGHTLY
Qty: 30 TABLET | Refills: 3 | Status: SHIPPED | OUTPATIENT
Start: 2024-11-12

## 2024-11-15 ENCOUNTER — OFFICE VISIT (OUTPATIENT)
Dept: ORTHOPEDIC SURGERY | Age: 64
End: 2024-11-15

## 2024-11-15 VITALS
TEMPERATURE: 97.2 F | OXYGEN SATURATION: 100 % | HEART RATE: 75 BPM | HEIGHT: 67 IN | BODY MASS INDEX: 45.99 KG/M2 | WEIGHT: 293 LBS

## 2024-11-15 DIAGNOSIS — M17.0 PRIMARY OSTEOARTHRITIS OF BOTH KNEES: Primary | ICD-10-CM

## 2024-11-15 RX ORDER — TRIAMCINOLONE ACETONIDE 40 MG/ML
80 INJECTION, SUSPENSION INTRA-ARTICULAR; INTRAMUSCULAR ONCE
Status: COMPLETED | OUTPATIENT
Start: 2024-11-15 | End: 2024-11-15

## 2024-11-15 RX ORDER — LIDOCAINE HYDROCHLORIDE 10 MG/ML
8 INJECTION, SOLUTION INFILTRATION; PERINEURAL ONCE
Status: COMPLETED | OUTPATIENT
Start: 2024-11-15 | End: 2024-11-15

## 2024-11-15 RX ADMIN — LIDOCAINE HYDROCHLORIDE 8 ML: 10 INJECTION, SOLUTION INFILTRATION; PERINEURAL at 10:55

## 2024-11-15 RX ADMIN — TRIAMCINOLONE ACETONIDE 80 MG: 40 INJECTION, SUSPENSION INTRA-ARTICULAR; INTRAMUSCULAR at 10:56

## 2024-11-15 ASSESSMENT — ENCOUNTER SYMPTOMS
RESPIRATORY NEGATIVE: 1
GASTROINTESTINAL NEGATIVE: 1
EYES NEGATIVE: 1

## 2024-11-15 NOTE — PROGRESS NOTES
Cande Richey (:  1960) is a 64 y.o. female,Established patient, here for evaluation of the following chief complaint(s):  Follow-up (Bilateral knee OA. Pain is currently 6/10)         Assessment & Plan  Primary osteoarthritis of both knees   Chronic, not at goal (unstable), changes made today: Cortisone injection left knee today and lifestyle modifications recommended    Orders:    DRAIN/INJECT LARGE JOINT/BURSA    lidocaine 1 % injection 8 mL    triamcinolone acetonide (KENALOG-40) injection 80 mg      No follow-ups on file.       Subjective   This is a 64-year-old female following up for complaint of bilateral knee pain secondary to osteoarthritis.  I have injected her knees previously with cortisone as well as Visco lubricant.  She presents today requesting intra-articular cortisone injection of the left knee.  She denies instability or locking.        Review of Systems   Constitutional: Negative.    HENT: Negative.     Eyes: Negative.    Respiratory: Negative.     Gastrointestinal: Negative.    Genitourinary: Negative.    Musculoskeletal: Negative.    Psychiatric/Behavioral: Negative.            Objective   Physical Exam  Constitutional:       Appearance: Normal appearance.   HENT:      Head: Normocephalic and atraumatic.      Mouth/Throat:      Mouth: Mucous membranes are moist.   Eyes:      Extraocular Movements: Extraocular movements intact.   Musculoskeletal:      Cervical back: Normal range of motion.      Comments: Right knee-joint effusion present.  Popliteal cyst present.  No warmth, erythema, fluctuance or sign of infection.  Full extension, flexion is 110 degrees which is symmetrical.  The knee joint is stable, there is varus laxity when the joint is stressed.  Tenderness with palpation of the lateral femoral condyle.  Patella femoral crepitus with range of motion.  Calf soft and nontender.    Left knee-no effusion present.  Popliteal cyst present no warmth, erythema, fluctuance or sign

## 2024-12-09 DIAGNOSIS — G47.33 OSA (OBSTRUCTIVE SLEEP APNEA): Primary | ICD-10-CM

## 2024-12-12 DIAGNOSIS — M10.9 ACUTE GOUT OF MULTIPLE SITES, UNSPECIFIED CAUSE: ICD-10-CM

## 2024-12-12 RX ORDER — FEBUXOSTAT 80 MG/1
80 TABLET, FILM COATED ORAL DAILY
Qty: 30 TABLET | Refills: 2 | Status: SHIPPED | OUTPATIENT
Start: 2024-12-12

## 2024-12-12 NOTE — TELEPHONE ENCOUNTER
Pharmacy requesting medication refill. Please approve or deny this request.    Rx requested:  Requested Prescriptions     Pending Prescriptions Disp Refills    febuxostat (ULORIC) 80 MG TABS tablet [Pharmacy Med Name: febuxostat 80 mg tablet] 30 tablet 2     Sig: TAKE 1 TABLET BY MOUTH DAILY         Last Office Visit:   10/24/2024      Next Visit Date:  Future Appointments   Date Time Provider Department Center   6/26/2025  1:00 PM Beverley Adam DO [facility]

## 2024-12-23 RX ORDER — FESOTERODINE FUMARATE 8 MG/1
1 TABLET, FILM COATED, EXTENDED RELEASE ORAL DAILY
Qty: 30 TABLET | Refills: 5 | Status: SHIPPED | OUTPATIENT
Start: 2024-12-23

## 2024-12-26 ENCOUNTER — TELEPHONE (OUTPATIENT)
Age: 64
End: 2024-12-26

## 2024-12-26 NOTE — TELEPHONE ENCOUNTER
Closed  PA Detail   Close reason: Prior Authorization not required for patient/medication  Payer: [employer] Patient's Payer Case ID: [id number]    [phone]  Note from payer: This medication or product is on your plan's list of covered drugs. Prior authorization is not required at this time. If your pharmacy has questions regarding the processing of your prescription, please have them call the [employer] pharmacy help desk at [phone]. **Please note: This request was submitted electronically. Formulary lowering, tiering exception, cost reduction and/or pre-benefit determination review (including prospective Medicare hospice reviews) requests cannot be requested using this method of submission. Providers contact us at [phone] for further assistance.  Prior auth initiated by: [facility] - [place], [place] - [address] - P [phone] - F [phone]  View History  Medication Being Authorized    febuxostat (ULORIC) 80 MG TABS tablet  TAKE 1 TABLET BY MOUTH DAILY  Dispense: 30 tablet Refills: 2   Start: 2024   Class: Normal Diagnoses: Acute gout of multiple sites, unspecified cause   This order has been released to its destination.  To be filled at: [facility] - [place], [place] - [address] - P [phone] - F [phone]  Pharmacy Benefits   Open Encounter CHAS WASHINGTON  [facility] [employer] ([employer])    Covered: Retail, Mail Order    Unknown: Specialty, Long-Term Care  Member ID: [id number] BIN: [id number] : 1960   Group ID: [id number] PCN: [id number] Legal sex: F   Group name:    Address: [address] [place], [place] [address]

## 2025-02-11 DIAGNOSIS — Z12.4 SCREENING FOR CERVICAL CANCER: ICD-10-CM

## 2025-02-11 RX ORDER — FOLIC ACID 1 MG/1
1000 TABLET ORAL DAILY
Qty: 90 TABLET | Refills: 1 | Status: SHIPPED | OUTPATIENT
Start: 2025-02-11

## 2025-02-11 NOTE — TELEPHONE ENCOUNTER
Future Appointments    Encounter Information   Provider Department Appt Notes   6/26/2025 Beverley Adam, [facility] Cardiology 1 year f/u     Past Visits    Date Provider Specialty Visit Type Primary Dx   11/15/2024 Mihai Ramirez, PA Orthopedic Surgery Office Visit Primary osteoarthritis of both knees   10/24/2024 Kirsten Aguilera, APRN - CNP Family Medicine Office Visit Primary hypertension

## 2025-02-20 ENCOUNTER — HOSPITAL ENCOUNTER (OUTPATIENT)
Dept: SLEEP CENTER | Age: 65
Discharge: HOME | End: 2025-02-22
Attending: INTERNAL MEDICINE
Payer: COMMERCIAL

## 2025-02-20 DIAGNOSIS — G47.33 OSA (OBSTRUCTIVE SLEEP APNEA): ICD-10-CM

## 2025-02-20 PROCEDURE — 95806 SLEEP STUDY UNATT&RESP EFFT: CPT

## 2025-02-25 ENCOUNTER — APPOINTMENT (OUTPATIENT)
Dept: CT IMAGING | Age: 65
DRG: 103 | End: 2025-02-25
Payer: COMMERCIAL

## 2025-02-25 ENCOUNTER — HOSPITAL ENCOUNTER (INPATIENT)
Age: 65
LOS: 4 days | Discharge: HOME | DRG: 103 | End: 2025-03-02
Attending: INTERNAL MEDICINE | Admitting: INTERNAL MEDICINE
Payer: COMMERCIAL

## 2025-02-25 ENCOUNTER — APPOINTMENT (OUTPATIENT)
Dept: GENERAL RADIOLOGY | Age: 65
DRG: 103 | End: 2025-02-25
Payer: COMMERCIAL

## 2025-02-25 DIAGNOSIS — N18.30 STAGE 3 CHRONIC KIDNEY DISEASE, UNSPECIFIED WHETHER STAGE 3A OR 3B CKD (HCC): ICD-10-CM

## 2025-02-25 DIAGNOSIS — I89.0 LYMPHEDEMA: ICD-10-CM

## 2025-02-25 DIAGNOSIS — H11.32 CONJUNCTIVAL HEMORRHAGE OF LEFT EYE: Primary | ICD-10-CM

## 2025-02-25 DIAGNOSIS — E87.5 HYPERKALEMIA: ICD-10-CM

## 2025-02-25 LAB
ALBUMIN SERPL-MCNC: 4.3 G/DL (ref 3.5–4.6)
ALP SERPL-CCNC: 63 U/L (ref 40–130)
ALT SERPL-CCNC: 12 U/L (ref 0–33)
ANION GAP SERPL CALCULATED.3IONS-SCNC: 13 MEQ/L (ref 9–15)
APTT PPP: 28.9 SEC (ref 24.4–36.8)
AST SERPL-CCNC: 13 U/L (ref 0–35)
BASOPHILS # BLD: 0 K/UL (ref 0–0.2)
BASOPHILS NFR BLD: 0.6 %
BILIRUB SERPL-MCNC: <0.2 MG/DL (ref 0.2–0.7)
BUN SERPL-MCNC: 91 MG/DL (ref 8–23)
CALCIUM SERPL-MCNC: 10.4 MG/DL (ref 8.5–9.9)
CHLORIDE SERPL-SCNC: 103 MEQ/L (ref 95–107)
CO2 SERPL-SCNC: 22 MEQ/L (ref 20–31)
CREAT SERPL-MCNC: 2.96 MG/DL (ref 0.5–0.9)
CRP SERPL HS-MCNC: <3 MG/L (ref 0–5)
EOSINOPHIL # BLD: 0.7 K/UL (ref 0–0.7)
EOSINOPHIL NFR BLD: 10.1 %
ERYTHROCYTE [DISTWIDTH] IN BLOOD BY AUTOMATED COUNT: 16.3 % (ref 11.5–14.5)
ERYTHROCYTE [SEDIMENTATION RATE] IN BLOOD BY WESTERGREN METHOD: 68 MM (ref 0–30)
GLOBULIN SER CALC-MCNC: 3.1 G/DL (ref 2.3–3.5)
GLUCOSE SERPL-MCNC: 107 MG/DL (ref 70–99)
HCT VFR BLD AUTO: 30.1 % (ref 37–47)
HGB BLD-MCNC: 9.3 G/DL (ref 12–16)
INR PPP: 1.5
LYMPHOCYTES # BLD: 2.6 K/UL (ref 1–4.8)
LYMPHOCYTES NFR BLD: 37.5 %
MAGNESIUM SERPL-MCNC: 3 MG/DL (ref 1.7–2.4)
MCH RBC QN AUTO: 26.5 PG (ref 27–31.3)
MCHC RBC AUTO-ENTMCNC: 30.9 % (ref 33–37)
MCV RBC AUTO: 85.8 FL (ref 79.4–94.8)
MONOCYTES # BLD: 0.4 K/UL (ref 0.2–0.8)
MONOCYTES NFR BLD: 5.6 %
NEUTROPHILS # BLD: 3.2 K/UL (ref 1.4–6.5)
NEUTS SEG NFR BLD: 46.1 %
PLATELET # BLD AUTO: 287 K/UL (ref 130–400)
POTASSIUM SERPL-SCNC: 5.8 MEQ/L (ref 3.4–4.9)
PROT SERPL-MCNC: 7.4 G/DL (ref 6.3–8)
PROTHROMBIN TIME: 18.9 SEC (ref 12.3–14.9)
RBC # BLD AUTO: 3.51 M/UL (ref 4.2–5.4)
SODIUM SERPL-SCNC: 138 MEQ/L (ref 135–144)
WBC # BLD AUTO: 7 K/UL (ref 4.8–10.8)

## 2025-02-25 PROCEDURE — 36415 COLL VENOUS BLD VENIPUNCTURE: CPT

## 2025-02-25 PROCEDURE — 6370000000 HC RX 637 (ALT 250 FOR IP)

## 2025-02-25 PROCEDURE — 2580000003 HC RX 258

## 2025-02-25 PROCEDURE — 71045 X-RAY EXAM CHEST 1 VIEW: CPT

## 2025-02-25 PROCEDURE — 85652 RBC SED RATE AUTOMATED: CPT

## 2025-02-25 PROCEDURE — 99285 EMERGENCY DEPT VISIT HI MDM: CPT

## 2025-02-25 PROCEDURE — 85025 COMPLETE CBC W/AUTO DIFF WBC: CPT

## 2025-02-25 PROCEDURE — 70450 CT HEAD/BRAIN W/O DYE: CPT

## 2025-02-25 PROCEDURE — 80053 COMPREHEN METABOLIC PANEL: CPT

## 2025-02-25 PROCEDURE — 85610 PROTHROMBIN TIME: CPT

## 2025-02-25 PROCEDURE — 86140 C-REACTIVE PROTEIN: CPT

## 2025-02-25 PROCEDURE — 96365 THER/PROPH/DIAG IV INF INIT: CPT

## 2025-02-25 PROCEDURE — 83735 ASSAY OF MAGNESIUM: CPT

## 2025-02-25 PROCEDURE — 6360000002 HC RX W HCPCS

## 2025-02-25 PROCEDURE — 93005 ELECTROCARDIOGRAM TRACING: CPT

## 2025-02-25 PROCEDURE — 85730 THROMBOPLASTIN TIME PARTIAL: CPT

## 2025-02-25 RX ORDER — DEXAMETHASONE SODIUM PHOSPHATE 10 MG/ML
10 INJECTION, SOLUTION INTRA-ARTICULAR; INTRALESIONAL; INTRAMUSCULAR; INTRAVENOUS; SOFT TISSUE ONCE
Status: DISCONTINUED | OUTPATIENT
Start: 2025-02-25 | End: 2025-02-26

## 2025-02-25 RX ORDER — TETRACAINE HYDROCHLORIDE 5 MG/ML
1 SOLUTION OPHTHALMIC ONCE
Status: DISCONTINUED | OUTPATIENT
Start: 2025-02-25 | End: 2025-03-02 | Stop reason: HOSPADM

## 2025-02-25 RX ORDER — MAGNESIUM SULFATE IN WATER 40 MG/ML
2000 INJECTION, SOLUTION INTRAVENOUS ONCE
Status: DISCONTINUED | OUTPATIENT
Start: 2025-02-25 | End: 2025-02-25

## 2025-02-25 RX ORDER — 0.9 % SODIUM CHLORIDE 0.9 %
1000 INTRAVENOUS SOLUTION INTRAVENOUS ONCE
Status: COMPLETED | OUTPATIENT
Start: 2025-02-25 | End: 2025-02-25

## 2025-02-25 RX ORDER — CALCIUM GLUCONATE 20 MG/ML
1000 INJECTION, SOLUTION INTRAVENOUS ONCE
Status: COMPLETED | OUTPATIENT
Start: 2025-02-25 | End: 2025-02-25

## 2025-02-25 RX ORDER — ACETAMINOPHEN 325 MG/1
650 TABLET ORAL ONCE
Status: COMPLETED | OUTPATIENT
Start: 2025-02-25 | End: 2025-02-25

## 2025-02-25 RX ADMIN — SODIUM CHLORIDE 1000 ML: 0.9 INJECTION, SOLUTION INTRAVENOUS at 20:01

## 2025-02-25 RX ADMIN — ACETAMINOPHEN 650 MG: 325 TABLET ORAL at 20:02

## 2025-02-25 RX ADMIN — CALCIUM GLUCONATE 1000 MG: 20 INJECTION, SOLUTION INTRAVENOUS at 22:23

## 2025-02-25 ASSESSMENT — PAIN SCALES - GENERAL
PAINLEVEL_OUTOF10: 6
PAINLEVEL_OUTOF10: 5

## 2025-02-25 ASSESSMENT — PAIN DESCRIPTION - LOCATION: LOCATION: HEAD

## 2025-02-25 ASSESSMENT — PAIN DESCRIPTION - FREQUENCY: FREQUENCY: CONTINUOUS

## 2025-02-25 ASSESSMENT — PAIN - FUNCTIONAL ASSESSMENT
PAIN_FUNCTIONAL_ASSESSMENT: 0-10
PAIN_FUNCTIONAL_ASSESSMENT: PREVENTS OR INTERFERES SOME ACTIVE ACTIVITIES AND ADLS

## 2025-02-25 ASSESSMENT — PAIN DESCRIPTION - PAIN TYPE: TYPE: ACUTE PAIN

## 2025-02-25 ASSESSMENT — PAIN DESCRIPTION - DESCRIPTORS: DESCRIPTORS: ACHING

## 2025-02-25 ASSESSMENT — PAIN DESCRIPTION - ONSET: ONSET: ON-GOING

## 2025-02-25 NOTE — ED TRIAGE NOTES
Pt c/o a headache, nausea, and dizziness that started approximately two hours PTA, Pt is A&OX4, calm, afebrile, ambulatory with a walker, breathes are equal and unlabored, sensation and movement intact throughout extremities,

## 2025-02-26 PROBLEM — R51.9 HEADACHE: Status: ACTIVE | Noted: 2025-02-26

## 2025-02-26 PROBLEM — H11.32 CONJUNCTIVAL HEMORRHAGE OF LEFT EYE: Status: ACTIVE | Noted: 2025-02-26

## 2025-02-26 LAB
ALBUMIN SERPL-MCNC: 3.9 G/DL (ref 3.5–4.6)
ALP SERPL-CCNC: 65 U/L (ref 40–130)
ALT SERPL-CCNC: 11 U/L (ref 0–33)
ANION GAP SERPL CALCULATED.3IONS-SCNC: 12 MEQ/L (ref 9–15)
ANISOCYTOSIS BLD QL SMEAR: ABNORMAL
AST SERPL-CCNC: 14 U/L (ref 0–35)
BACTERIA URNS QL MICRO: ABNORMAL /HPF
BASOPHILS # BLD: 0 K/UL (ref 0–0.2)
BASOPHILS NFR BLD: 0.5 %
BILIRUB SERPL-MCNC: 0.3 MG/DL (ref 0.2–0.7)
BILIRUB UR QL STRIP: NEGATIVE
BUN SERPL-MCNC: 84 MG/DL (ref 8–23)
BURR CELLS: ABNORMAL
C-REACTIVE PROTEIN, HIGH SENSITIVITY: 1.8 MG/L (ref 0–5)
CALCIUM SERPL-MCNC: 10.6 MG/DL (ref 8.5–9.9)
CHLORIDE SERPL-SCNC: 108 MEQ/L (ref 95–107)
CLARITY UR: CLEAR
CO2 SERPL-SCNC: 19 MEQ/L (ref 20–31)
COLOR UR: YELLOW
CREAT SERPL-MCNC: 2.28 MG/DL (ref 0.5–0.9)
EKG ATRIAL RATE: 77 BPM
EKG P AXIS: -28 DEGREES
EKG P-R INTERVAL: 182 MS
EKG Q-T INTERVAL: 410 MS
EKG QRS DURATION: 98 MS
EKG QTC CALCULATION (BAZETT): 463 MS
EKG R AXIS: -36 DEGREES
EKG T AXIS: 25 DEGREES
EKG VENTRICULAR RATE: 77 BPM
EOSINOPHIL # BLD: 0.1 K/UL (ref 0–0.7)
EOSINOPHIL NFR BLD: 1.1 %
EPI CELLS #/AREA URNS AUTO: ABNORMAL /HPF (ref 0–5)
ERYTHROCYTE [DISTWIDTH] IN BLOOD BY AUTOMATED COUNT: 16.2 % (ref 11.5–14.5)
ERYTHROCYTE [SEDIMENTATION RATE] IN BLOOD BY WESTERGREN METHOD: 61 MM (ref 0–30)
GLOBULIN SER CALC-MCNC: 3.4 G/DL (ref 2.3–3.5)
GLUCOSE BLD-MCNC: 137 MG/DL (ref 70–99)
GLUCOSE BLD-MCNC: 149 MG/DL (ref 70–99)
GLUCOSE BLD-MCNC: 159 MG/DL (ref 70–99)
GLUCOSE BLD-MCNC: 172 MG/DL (ref 70–99)
GLUCOSE BLD-MCNC: 176 MG/DL (ref 70–99)
GLUCOSE BLD-MCNC: 192 MG/DL (ref 70–99)
GLUCOSE BLD-MCNC: 210 MG/DL (ref 70–99)
GLUCOSE SERPL-MCNC: 107 MG/DL (ref 70–99)
GLUCOSE UR STRIP-MCNC: NEGATIVE MG/DL
HCT VFR BLD AUTO: 31.1 % (ref 37–47)
HGB BLD-MCNC: 9.1 G/DL (ref 12–16)
HGB UR QL STRIP: NEGATIVE
HYALINE CASTS #/AREA URNS AUTO: ABNORMAL /HPF (ref 0–5)
HYPOCHROMIA BLD QL SMEAR: ABNORMAL
KETONES UR STRIP-MCNC: NEGATIVE MG/DL
LEUKOCYTE ESTERASE UR QL STRIP: ABNORMAL
LYMPHOCYTES # BLD: 0.9 K/UL (ref 1–4.8)
LYMPHOCYTES NFR BLD: 13.2 %
MAGNESIUM SERPL-MCNC: 2.8 MG/DL (ref 1.7–2.4)
MCH RBC QN AUTO: 25.9 PG (ref 27–31.3)
MCHC RBC AUTO-ENTMCNC: 29.3 % (ref 33–37)
MCV RBC AUTO: 88.6 FL (ref 79.4–94.8)
MONOCYTES # BLD: 0.1 K/UL (ref 0.2–0.8)
MONOCYTES NFR BLD: 1.5 %
NEUTROPHILS # BLD: 5.4 K/UL (ref 1.4–6.5)
NEUTS SEG NFR BLD: 83.4 %
NITRITE UR QL STRIP: NEGATIVE
PERFORMED ON: ABNORMAL
PH UR STRIP: 5.5 [PH] (ref 5–9)
PLATELET # BLD AUTO: 240 K/UL (ref 130–400)
PLATELET BLD QL SMEAR: ADEQUATE
POIKILOCYTOSIS BLD QL SMEAR: ABNORMAL
POTASSIUM SERPL-SCNC: 5.6 MEQ/L (ref 3.4–4.9)
POTASSIUM SERPL-SCNC: 5.9 MEQ/L (ref 3.4–4.9)
POTASSIUM SERPL-SCNC: 6 MEQ/L (ref 3.4–4.9)
PROT SERPL-MCNC: 7.3 G/DL (ref 6.3–8)
PROT UR STRIP-MCNC: NEGATIVE MG/DL
RBC # BLD AUTO: 3.51 M/UL (ref 4.2–5.4)
RBC #/AREA URNS AUTO: ABNORMAL /HPF (ref 0–5)
SLIDE REVIEW: ABNORMAL
SODIUM SERPL-SCNC: 139 MEQ/L (ref 135–144)
SP GR UR STRIP: 1.01 (ref 1–1.03)
URINE REFLEX TO CULTURE: ABNORMAL
UROBILINOGEN UR STRIP-ACNC: 0.2 E.U./DL
WBC # BLD AUTO: 6.5 K/UL (ref 4.8–10.8)
WBC #/AREA URNS AUTO: ABNORMAL /HPF (ref 0–5)

## 2025-02-26 PROCEDURE — 6360000002 HC RX W HCPCS: Performed by: STUDENT IN AN ORGANIZED HEALTH CARE EDUCATION/TRAINING PROGRAM

## 2025-02-26 PROCEDURE — 83735 ASSAY OF MAGNESIUM: CPT

## 2025-02-26 PROCEDURE — APPSS45 APP SPLIT SHARED TIME 31-45 MINUTES: Performed by: NURSE PRACTITIONER

## 2025-02-26 PROCEDURE — 6360000002 HC RX W HCPCS

## 2025-02-26 PROCEDURE — 84132 ASSAY OF SERUM POTASSIUM: CPT

## 2025-02-26 PROCEDURE — 96375 TX/PRO/DX INJ NEW DRUG ADDON: CPT

## 2025-02-26 PROCEDURE — 86141 C-REACTIVE PROTEIN HS: CPT

## 2025-02-26 PROCEDURE — 85652 RBC SED RATE AUTOMATED: CPT

## 2025-02-26 PROCEDURE — 80053 COMPREHEN METABOLIC PANEL: CPT

## 2025-02-26 PROCEDURE — 99223 1ST HOSP IP/OBS HIGH 75: CPT | Performed by: PSYCHIATRY & NEUROLOGY

## 2025-02-26 PROCEDURE — 2580000003 HC RX 258

## 2025-02-26 PROCEDURE — 6370000000 HC RX 637 (ALT 250 FOR IP): Performed by: STUDENT IN AN ORGANIZED HEALTH CARE EDUCATION/TRAINING PROGRAM

## 2025-02-26 PROCEDURE — 36415 COLL VENOUS BLD VENIPUNCTURE: CPT

## 2025-02-26 PROCEDURE — 97166 OT EVAL MOD COMPLEX 45 MIN: CPT

## 2025-02-26 PROCEDURE — 2500000003 HC RX 250 WO HCPCS

## 2025-02-26 PROCEDURE — 97162 PT EVAL MOD COMPLEX 30 MIN: CPT

## 2025-02-26 PROCEDURE — 1210000000 HC MED SURG R&B

## 2025-02-26 PROCEDURE — 81001 URINALYSIS AUTO W/SCOPE: CPT

## 2025-02-26 PROCEDURE — 85025 COMPLETE CBC W/AUTO DIFF WBC: CPT

## 2025-02-26 PROCEDURE — 2580000003 HC RX 258: Performed by: STUDENT IN AN ORGANIZED HEALTH CARE EDUCATION/TRAINING PROGRAM

## 2025-02-26 PROCEDURE — 6370000000 HC RX 637 (ALT 250 FOR IP)

## 2025-02-26 RX ORDER — ONDANSETRON 2 MG/ML
4 INJECTION INTRAMUSCULAR; INTRAVENOUS ONCE
Status: COMPLETED | OUTPATIENT
Start: 2025-02-26 | End: 2025-02-26

## 2025-02-26 RX ORDER — ONDANSETRON 4 MG/1
4 TABLET, ORALLY DISINTEGRATING ORAL EVERY 8 HOURS PRN
Status: DISCONTINUED | OUTPATIENT
Start: 2025-02-26 | End: 2025-03-02 | Stop reason: HOSPADM

## 2025-02-26 RX ORDER — SODIUM CHLORIDE 0.9 % (FLUSH) 0.9 %
5-40 SYRINGE (ML) INJECTION EVERY 12 HOURS SCHEDULED
Status: DISCONTINUED | OUTPATIENT
Start: 2025-02-26 | End: 2025-02-28 | Stop reason: SDUPTHER

## 2025-02-26 RX ORDER — METOPROLOL TARTRATE 50 MG
50 TABLET ORAL 2 TIMES DAILY
Status: DISCONTINUED | OUTPATIENT
Start: 2025-02-26 | End: 2025-03-02 | Stop reason: HOSPADM

## 2025-02-26 RX ORDER — 0.9 % SODIUM CHLORIDE 0.9 %
1000 INTRAVENOUS SOLUTION INTRAVENOUS ONCE
Status: COMPLETED | OUTPATIENT
Start: 2025-02-26 | End: 2025-02-26

## 2025-02-26 RX ORDER — ATORVASTATIN CALCIUM 40 MG/1
40 TABLET, FILM COATED ORAL NIGHTLY
Status: DISCONTINUED | OUTPATIENT
Start: 2025-02-26 | End: 2025-03-02 | Stop reason: HOSPADM

## 2025-02-26 RX ORDER — ACETAMINOPHEN 325 MG/1
650 TABLET ORAL EVERY 6 HOURS PRN
Status: DISCONTINUED | OUTPATIENT
Start: 2025-02-26 | End: 2025-03-02 | Stop reason: HOSPADM

## 2025-02-26 RX ORDER — TROSPIUM CHLORIDE 20 MG/1
20 TABLET, FILM COATED ORAL
Status: DISCONTINUED | OUTPATIENT
Start: 2025-02-26 | End: 2025-03-02 | Stop reason: HOSPADM

## 2025-02-26 RX ORDER — PREDNISONE 20 MG/1
60 TABLET ORAL DAILY
Status: DISCONTINUED | OUTPATIENT
Start: 2025-02-26 | End: 2025-03-02 | Stop reason: HOSPADM

## 2025-02-26 RX ORDER — GLUCAGON 1 MG/ML
1 KIT INJECTION PRN
Status: DISCONTINUED | OUTPATIENT
Start: 2025-02-26 | End: 2025-03-02 | Stop reason: HOSPADM

## 2025-02-26 RX ORDER — PANTOPRAZOLE SODIUM 40 MG/1
40 TABLET, DELAYED RELEASE ORAL
Status: DISCONTINUED | OUTPATIENT
Start: 2025-02-27 | End: 2025-03-02 | Stop reason: HOSPADM

## 2025-02-26 RX ORDER — ONDANSETRON 2 MG/ML
4 INJECTION INTRAMUSCULAR; INTRAVENOUS EVERY 6 HOURS PRN
Status: DISCONTINUED | OUTPATIENT
Start: 2025-02-26 | End: 2025-03-02 | Stop reason: HOSPADM

## 2025-02-26 RX ORDER — POLYETHYLENE GLYCOL 3350 17 G/17G
17 POWDER, FOR SOLUTION ORAL DAILY PRN
Status: DISCONTINUED | OUTPATIENT
Start: 2025-02-26 | End: 2025-03-02 | Stop reason: HOSPADM

## 2025-02-26 RX ORDER — ACETAMINOPHEN 650 MG/1
650 SUPPOSITORY RECTAL EVERY 6 HOURS PRN
Status: DISCONTINUED | OUTPATIENT
Start: 2025-02-26 | End: 2025-03-02 | Stop reason: HOSPADM

## 2025-02-26 RX ORDER — SODIUM CHLORIDE 0.9 % (FLUSH) 0.9 %
5-40 SYRINGE (ML) INJECTION PRN
Status: DISCONTINUED | OUTPATIENT
Start: 2025-02-26 | End: 2025-02-28 | Stop reason: SDUPTHER

## 2025-02-26 RX ORDER — MORPHINE SULFATE 2 MG/ML
2 INJECTION, SOLUTION INTRAMUSCULAR; INTRAVENOUS ONCE
Status: COMPLETED | OUTPATIENT
Start: 2025-02-26 | End: 2025-02-26

## 2025-02-26 RX ORDER — DEXTROSE MONOHYDRATE 100 MG/ML
INJECTION, SOLUTION INTRAVENOUS CONTINUOUS PRN
Status: DISCONTINUED | OUTPATIENT
Start: 2025-02-26 | End: 2025-03-02 | Stop reason: HOSPADM

## 2025-02-26 RX ORDER — FLECAINIDE ACETATE 50 MG/1
25 TABLET ORAL 2 TIMES DAILY
Status: DISCONTINUED | OUTPATIENT
Start: 2025-02-26 | End: 2025-03-02 | Stop reason: HOSPADM

## 2025-02-26 RX ORDER — CALCIUM GLUCONATE 20 MG/ML
1000 INJECTION, SOLUTION INTRAVENOUS ONCE
Status: COMPLETED | OUTPATIENT
Start: 2025-02-26 | End: 2025-02-26

## 2025-02-26 RX ORDER — SODIUM CHLORIDE 9 MG/ML
INJECTION, SOLUTION INTRAVENOUS CONTINUOUS
Status: DISPENSED | OUTPATIENT
Start: 2025-02-26 | End: 2025-02-27

## 2025-02-26 RX ORDER — SIMETHICONE 80 MG
80 TABLET,CHEWABLE ORAL EVERY 6 HOURS PRN
Status: DISCONTINUED | OUTPATIENT
Start: 2025-02-26 | End: 2025-03-02 | Stop reason: HOSPADM

## 2025-02-26 RX ORDER — SODIUM CHLORIDE 9 MG/ML
INJECTION, SOLUTION INTRAVENOUS PRN
Status: DISCONTINUED | OUTPATIENT
Start: 2025-02-26 | End: 2025-03-02 | Stop reason: HOSPADM

## 2025-02-26 RX ORDER — LEVOTHYROXINE SODIUM 100 UG/1
200 TABLET ORAL DAILY
Status: DISCONTINUED | OUTPATIENT
Start: 2025-02-26 | End: 2025-03-02 | Stop reason: HOSPADM

## 2025-02-26 RX ORDER — PREDNISONE 20 MG/1
60 TABLET ORAL DAILY
Status: DISCONTINUED | OUTPATIENT
Start: 2025-02-26 | End: 2025-02-26

## 2025-02-26 RX ORDER — FOLIC ACID 1 MG/1
1000 TABLET ORAL DAILY
Status: DISCONTINUED | OUTPATIENT
Start: 2025-02-26 | End: 2025-03-02 | Stop reason: HOSPADM

## 2025-02-26 RX ADMIN — FLECAINIDE ACETATE 25 MG: 50 TABLET ORAL at 23:53

## 2025-02-26 RX ADMIN — Medication 5 ML: at 09:43

## 2025-02-26 RX ADMIN — MORPHINE SULFATE 2 MG: 2 INJECTION, SOLUTION INTRAMUSCULAR; INTRAVENOUS at 01:40

## 2025-02-26 RX ADMIN — INSULIN HUMAN 10 UNITS: 100 INJECTION, SOLUTION PARENTERAL at 08:50

## 2025-02-26 RX ADMIN — SODIUM ZIRCONIUM CYCLOSILICATE 10 G: 10 POWDER, FOR SUSPENSION ORAL at 08:48

## 2025-02-26 RX ADMIN — ONDANSETRON 4 MG: 4 TABLET, ORALLY DISINTEGRATING ORAL at 16:42

## 2025-02-26 RX ADMIN — CALCIUM GLUCONATE 1000 MG: 20 INJECTION, SOLUTION INTRAVENOUS at 08:32

## 2025-02-26 RX ADMIN — ONDANSETRON 4 MG: 2 INJECTION, SOLUTION INTRAMUSCULAR; INTRAVENOUS at 01:39

## 2025-02-26 RX ADMIN — SODIUM ZIRCONIUM CYCLOSILICATE 10 G: 10 POWDER, FOR SUSPENSION ORAL at 14:03

## 2025-02-26 RX ADMIN — SODIUM CHLORIDE: 0.9 INJECTION, SOLUTION INTRAVENOUS at 10:08

## 2025-02-26 RX ADMIN — FLECAINIDE ACETATE 25 MG: 50 TABLET ORAL at 11:25

## 2025-02-26 RX ADMIN — METHYLPREDNISOLONE SODIUM SUCCINATE 125 MG: 125 INJECTION INTRAMUSCULAR; INTRAVENOUS at 01:46

## 2025-02-26 RX ADMIN — TROSPIUM CHLORIDE 20 MG: 20 TABLET, FILM COATED ORAL at 16:10

## 2025-02-26 RX ADMIN — METOPROLOL TARTRATE 50 MG: 50 TABLET, FILM COATED ORAL at 23:55

## 2025-02-26 RX ADMIN — SIMETHICONE 80 MG: 80 TABLET, CHEWABLE ORAL at 23:01

## 2025-02-26 RX ADMIN — DEXTROSE MONOHYDRATE 250 ML: 100 INJECTION, SOLUTION INTRAVENOUS at 08:54

## 2025-02-26 RX ADMIN — APIXABAN 5 MG: 5 TABLET, FILM COATED ORAL at 08:47

## 2025-02-26 RX ADMIN — SODIUM ZIRCONIUM CYCLOSILICATE 5 G: 5 POWDER, FOR SUSPENSION ORAL at 23:54

## 2025-02-26 RX ADMIN — Medication 3 MG: at 23:54

## 2025-02-26 RX ADMIN — METOPROLOL TARTRATE 50 MG: 50 TABLET, FILM COATED ORAL at 11:25

## 2025-02-26 RX ADMIN — ATORVASTATIN CALCIUM 40 MG: 40 TABLET, FILM COATED ORAL at 23:55

## 2025-02-26 RX ADMIN — PREDNISONE 60 MG: 20 TABLET ORAL at 14:03

## 2025-02-26 RX ADMIN — SODIUM CHLORIDE 1000 ML: 0.9 INJECTION, SOLUTION INTRAVENOUS at 01:39

## 2025-02-26 RX ADMIN — LEVOTHYROXINE SODIUM 200 MCG: 0.1 TABLET ORAL at 11:24

## 2025-02-26 RX ADMIN — APIXABAN 5 MG: 5 TABLET, FILM COATED ORAL at 23:55

## 2025-02-26 RX ADMIN — FOLIC ACID 1000 MCG: 1 TABLET ORAL at 11:25

## 2025-02-26 ASSESSMENT — PAIN SCALES - GENERAL: PAINLEVEL_OUTOF10: 0

## 2025-02-26 NOTE — ACP (ADVANCE CARE PLANNING)
Advance Care Planning   Healthcare Decision Maker:    OZZY WASHINGTON- SON   [phone]  Click here to complete Healthcare Decision Makers including selection of the Healthcare Decision Maker Relationship (ie \"Primary\").  Today we documented Decision Maker(s) consistent with Legal Next of Kin hierarchy.           
[H11.32];Headache [R51.9];Stage 3 chronic kidney disease, unspecified whether stage 3a or 3b CKD (HCC) [N18.30]    IF APPLICABLE: The Patient and/or patient representative Cande and her family were provided with a choice of provider and agrees with the discharge plan. Freedom of choice list with basic dialogue that supports the patient's individualized plan of care/goals and shares the quality data associated with the providers was provided to:     Patient Representative Name:       The Patient and/or Patient Representative Agree with the Discharge Plan?      Darleen Pittman RN  Case Management Department  Ph:  Fax:

## 2025-02-26 NOTE — ED PROVIDER NOTES
vomiting.   Genitourinary:  Negative for dysuria.   Musculoskeletal:  Negative for back pain and myalgias.   Neurological:  Positive for headaches.       Except as noted above the remainder of the review of systems was reviewed and negative.       PAST MEDICAL HISTORY     Past Medical History:   Diagnosis Date    Atrial fibrillation (HCC)     Hyperlipidemia     Hypertension     Thyroid disease          SURGICAL HISTORY       Past Surgical History:   Procedure Laterality Date    JOINT REPLACEMENT      STIMULATOR SURGERY N/A 2/8/2024    MEDTRONIC STAGE 1 AND STAGE 2 INTERSTIM performed by Sabine Bradford MD at [facility] OR    THYROIDECTOMY           CURRENT MEDICATIONS       Previous Medications    ACETAMINOPHEN (TYLENOL) 500 MG TABLET    Take 2 tablets by mouth every 6 hours as needed for Pain    APIXABAN (ELIQUIS) 5 MG TABS TABLET    Take 1 tablet by mouth 2 times daily Indications: Preventative Treatment    ATORVASTATIN (LIPITOR) 40 MG TABLET    TAKE 1 TABLET BY MOUTH NIGHTLY    FEBUXOSTAT (ULORIC) 80 MG TABS TABLET    TAKE 1 TABLET BY MOUTH DAILY    FESOTERODINE FUMARATE ER 8 MG TB24    Take 1 tablet by mouth daily    FLECAINIDE (TAMBOCOR) 50 MG TABLET    Take 1 tablet by mouth 2 times daily Indications: Heart Rhythm Disorder    FOLIC ACID (FOLVITE) 1 MG TABLET    TAKE 1 TABLET BY MOUTH ONCE DAILY    FOSFOMYCIN TROMETHAMINE (MONUROL) 3 G PACK    Take 1 packet by mouth every 10 days    FUROSEMIDE (LASIX) 20 MG TABLET    Take 1 tablet by mouth daily    HANDICAP PLACARD MISC    by Does not apply route Exp 11/29/25 Dx: CHF, weakness, difficulty walking    LEVOTHYROXINE (SYNTHROID) 200 MCG TABLET    TAKE 1 TABLET BY MOUTH DAILY    METOPROLOL TARTRATE (LOPRESSOR) 50 MG TABLET    Take 1 tablet by mouth 2 times daily    MIRABEGRON (MYRBETRIQ) 50 MG TB24    Take 50 mg by mouth daily    OLMESARTAN (BENICAR) 40 MG TABLET    Take 1 tablet by mouth daily    OMEPRAZOLE (PRILOSEC) 40 MG DELAYED RELEASE CAPSULE    TAKE 1 CAPSULE BY

## 2025-02-26 NOTE — PROCEDURES
PROCEDURE NOTE  Date: 2/25/2025   Name: Cande Richey  YOB: 1960    Peripheral ultrasound IV    Date/Time: 2/25/2025 10:20 PM    Performed by: Jim Hassan DO  Authorized by: Jim Hassan DO  Consent: Verbal consent obtained.  Consent given by: patient  Patient understanding: patient states understanding of the procedure being performed  Required items: required blood products, implants, devices, and special equipment available  Patient identity confirmed: verbally with patient  Time out: Immediately prior to procedure a \"time out\" was called to verify the correct patient, procedure, equipment, support staff and site/side marked as required.  Preparation: Patient was prepped and draped in the usual sterile fashion.  Comments: 20-gauge peripheral ultrasound IV attempted x 2 in the left upper extremity.  Unsuccessful due to pain at the site of insertion.  Concern for nerve bundle at site of insertion

## 2025-02-26 NOTE — PLAN OF CARE
See OT evaluation for all goals and OT POC. Electronically signed by CLAIRE Rosales/L on 2/26/2025 at 10:03 AM

## 2025-02-26 NOTE — ED NOTES
Pt complains of  headache starting at 2 pm. Pt states that her headache started after she noticed reddening of her left eye. Pt does have red sclera. Provider is aware.

## 2025-02-26 NOTE — H&P
Sig Start Date End Date Taking? Authorizing Provider   folic acid (FOLVITE) 1 MG tablet TAKE 1 TABLET BY MOUTH ONCE DAILY 2/11/25   Kirsten Aguilera APRN - CNP   Fesoterodine Fumarate ER 8 MG TB24 Take 1 tablet by mouth daily 12/23/24   Sabine Bradford MD   febuxostat (ULORIC) 80 MG TABS tablet TAKE 1 TABLET BY MOUTH DAILY 12/12/24   Kirsten Aguilera APRN - CNP   atorvastatin (LIPITOR) 40 MG tablet TAKE 1 TABLET BY MOUTH NIGHTLY 11/12/24   Kirsten Aguilera APRN - CNP   levothyroxine (SYNTHROID) 200 MCG tablet TAKE 1 TABLET BY MOUTH DAILY 11/12/24   Kirsten Aguilera APRN - CNP   spironolactone (ALDACTONE) 25 MG tablet Take 1 tablet by mouth daily 10/24/24   Kirsten Aguilera APRN - CNP   apixaban (ELIQUIS) 5 MG TABS tablet Take 1 tablet by mouth 2 times daily Indications: Preventative Treatment 10/22/24   Beverley Adam DO   furosemide (LASIX) 20 MG tablet Take 1 tablet by mouth daily 9/24/24   Kirsten Aguilera APRN - CNP   ondansetron (ZOFRAN-ODT) 4 MG disintegrating tablet Take 1 tablet by mouth 3 times daily as needed for Nausea or Vomiting 9/17/24   Kirsten Aguilera APRN - CNP   Semaglutide,0.25 or 0.5MG/DOS, 2 MG/1.5ML SOPN COMPOUND MEDICATION- Inject 1.2 mg into the skin once a week 9/10/24   iKrsten Aguilera APRN - CNP   omeprazole (PRILOSEC) 40 MG delayed release capsule TAKE 1 CAPSULE BY MOUTH DAILY 7/16/24   Kirsten Aguilera APRN - CNP   fosfomycin tromethamine (MONUROL) 3 g PACK Take 1 packet by mouth every 10 days 7/15/24   Sabine Bradford MD   mirabegron (MYRBETRIQ) 50 MG TB24 Take 50 mg by mouth daily 6/24/24   Sabine Bradford MD   metoprolol tartrate (LOPRESSOR) 50 MG tablet Take 1 tablet by mouth 2 times daily 6/20/24   Beverley Adam DO   flecainide (TAMBOCOR) 50 MG tablet Take 1 tablet by mouth 2 times daily Indications: Heart Rhythm Disorder 6/20/24   Beverley Adam DO   olmesartan (BENICAR) 40 MG tablet Take 1 tablet by mouth daily 6/20/24   Beverley Adam DO   acetaminophen

## 2025-02-26 NOTE — CARE COORDINATION
SPOKE W/PT TO OBTAIN HHC CHOICES. PT INDICATES SHE HAS NOT LOOKED AT LIST YET AS SHE IS CURRENTLY NAUSEATED. CM TO FOLLOW TOMORROW AM ON HHC CHOICES.

## 2025-02-27 ENCOUNTER — TELEPHONE (OUTPATIENT)
Age: 65
End: 2025-02-27

## 2025-02-27 PROBLEM — K92.2 GIB (GASTROINTESTINAL BLEEDING): Status: ACTIVE | Noted: 2025-02-25

## 2025-02-27 LAB
ALBUMIN SERPL-MCNC: 3.5 G/DL (ref 3.5–4.6)
ALP SERPL-CCNC: 49 U/L (ref 40–130)
ALT SERPL-CCNC: 9 U/L (ref 0–33)
ANION GAP SERPL CALCULATED.3IONS-SCNC: 10 MEQ/L (ref 9–15)
AST SERPL-CCNC: 10 U/L (ref 0–35)
BASOPHILS # BLD: 0 K/UL (ref 0–0.2)
BASOPHILS NFR BLD: 0.1 %
BILIRUB SERPL-MCNC: <0.2 MG/DL (ref 0.2–0.7)
BUN SERPL-MCNC: 61 MG/DL (ref 8–23)
CALCIUM SERPL-MCNC: 9.8 MG/DL (ref 8.5–9.9)
CHLORIDE SERPL-SCNC: 111 MEQ/L (ref 95–107)
CO2 SERPL-SCNC: 21 MEQ/L (ref 20–31)
CREAT SERPL-MCNC: 1.74 MG/DL (ref 0.5–0.9)
EOSINOPHIL # BLD: 0 K/UL (ref 0–0.7)
EOSINOPHIL NFR BLD: 0 %
ERYTHROCYTE [DISTWIDTH] IN BLOOD BY AUTOMATED COUNT: 16.3 % (ref 11.5–14.5)
GLOBULIN SER CALC-MCNC: 2.5 G/DL (ref 2.3–3.5)
GLUCOSE BLD-MCNC: 139 MG/DL (ref 70–99)
GLUCOSE BLD-MCNC: 150 MG/DL (ref 70–99)
GLUCOSE SERPL-MCNC: 156 MG/DL (ref 70–99)
HCT VFR BLD AUTO: 24.5 % (ref 37–47)
HGB BLD-MCNC: 7.7 G/DL (ref 12–16)
LYMPHOCYTES # BLD: 0.9 K/UL (ref 1–4.8)
LYMPHOCYTES NFR BLD: 8 %
MAGNESIUM SERPL-MCNC: 2.4 MG/DL (ref 1.7–2.4)
MCH RBC QN AUTO: 26.7 PG (ref 27–31.3)
MCHC RBC AUTO-ENTMCNC: 31.4 % (ref 33–37)
MCV RBC AUTO: 85.1 FL (ref 79.4–94.8)
MONOCYTES # BLD: 0.3 K/UL (ref 0.2–0.8)
MONOCYTES NFR BLD: 2.8 %
NEUTROPHILS # BLD: 9.5 K/UL (ref 1.4–6.5)
NEUTS SEG NFR BLD: 88.7 %
PERFORMED ON: ABNORMAL
PERFORMED ON: ABNORMAL
PLATELET # BLD AUTO: 238 K/UL (ref 130–400)
POTASSIUM SERPL-SCNC: 5.3 MEQ/L (ref 3.4–4.9)
PROT SERPL-MCNC: 6 G/DL (ref 6.3–8)
RBC # BLD AUTO: 2.88 M/UL (ref 4.2–5.4)
SODIUM SERPL-SCNC: 142 MEQ/L (ref 135–144)
WBC # BLD AUTO: 10.7 K/UL (ref 4.8–10.8)

## 2025-02-27 PROCEDURE — 97116 GAIT TRAINING THERAPY: CPT

## 2025-02-27 PROCEDURE — 2500000003 HC RX 250 WO HCPCS

## 2025-02-27 PROCEDURE — 6370000000 HC RX 637 (ALT 250 FOR IP)

## 2025-02-27 PROCEDURE — 80053 COMPREHEN METABOLIC PANEL: CPT

## 2025-02-27 PROCEDURE — 99222 1ST HOSP IP/OBS MODERATE 55: CPT | Performed by: NURSE PRACTITIONER

## 2025-02-27 PROCEDURE — 97110 THERAPEUTIC EXERCISES: CPT

## 2025-02-27 PROCEDURE — 6370000000 HC RX 637 (ALT 250 FOR IP): Performed by: STUDENT IN AN ORGANIZED HEALTH CARE EDUCATION/TRAINING PROGRAM

## 2025-02-27 PROCEDURE — 36415 COLL VENOUS BLD VENIPUNCTURE: CPT

## 2025-02-27 PROCEDURE — 1210000000 HC MED SURG R&B

## 2025-02-27 PROCEDURE — APPSS15 APP SPLIT SHARED TIME 0-15 MINUTES: Performed by: NURSE PRACTITIONER

## 2025-02-27 PROCEDURE — 99232 SBSQ HOSP IP/OBS MODERATE 35: CPT | Performed by: PSYCHIATRY & NEUROLOGY

## 2025-02-27 PROCEDURE — 6370000000 HC RX 637 (ALT 250 FOR IP): Performed by: NURSE PRACTITIONER

## 2025-02-27 PROCEDURE — 83735 ASSAY OF MAGNESIUM: CPT

## 2025-02-27 PROCEDURE — 85025 COMPLETE CBC W/AUTO DIFF WBC: CPT

## 2025-02-27 RX ORDER — HYDROCORTISONE ACETATE 25 MG/1
25 SUPPOSITORY RECTAL NIGHTLY
Status: DISCONTINUED | OUTPATIENT
Start: 2025-02-27 | End: 2025-03-02 | Stop reason: HOSPADM

## 2025-02-27 RX ORDER — PEG-3350, SODIUM SULFATE, SODIUM CHLORIDE, POTASSIUM CHLORIDE, SODIUM ASCORBATE AND ASCORBIC ACID 7.5-2.691G
100 KIT ORAL ONCE
Status: COMPLETED | OUTPATIENT
Start: 2025-02-27 | End: 2025-02-27

## 2025-02-27 RX ADMIN — Medication 10 ML: at 09:46

## 2025-02-27 RX ADMIN — TROSPIUM CHLORIDE 20 MG: 20 TABLET, FILM COATED ORAL at 09:54

## 2025-02-27 RX ADMIN — FLECAINIDE ACETATE 25 MG: 50 TABLET ORAL at 09:44

## 2025-02-27 RX ADMIN — PREDNISONE 60 MG: 20 TABLET ORAL at 11:40

## 2025-02-27 RX ADMIN — FLECAINIDE ACETATE 25 MG: 50 TABLET ORAL at 21:07

## 2025-02-27 RX ADMIN — Medication 3 MG: at 21:07

## 2025-02-27 RX ADMIN — PANTOPRAZOLE SODIUM 40 MG: 40 TABLET, DELAYED RELEASE ORAL at 09:54

## 2025-02-27 RX ADMIN — Medication 10 ML: at 00:10

## 2025-02-27 RX ADMIN — ATORVASTATIN CALCIUM 40 MG: 40 TABLET, FILM COATED ORAL at 21:07

## 2025-02-27 RX ADMIN — Medication 10 ML: at 21:07

## 2025-02-27 RX ADMIN — FOLIC ACID 1000 MCG: 1 TABLET ORAL at 09:44

## 2025-02-27 RX ADMIN — LEVOTHYROXINE SODIUM 200 MCG: 0.1 TABLET ORAL at 09:44

## 2025-02-27 RX ADMIN — METOPROLOL TARTRATE 50 MG: 50 TABLET, FILM COATED ORAL at 21:07

## 2025-02-27 RX ADMIN — HYDROCORTISONE ACETATE 25 MG: 25 SUPPOSITORY RECTAL at 21:07

## 2025-02-27 RX ADMIN — METOPROLOL TARTRATE 50 MG: 50 TABLET, FILM COATED ORAL at 09:44

## 2025-02-27 RX ADMIN — TROSPIUM CHLORIDE 20 MG: 20 TABLET, FILM COATED ORAL at 16:43

## 2025-02-27 RX ADMIN — PEG-3350, SODIUM SULFATE, SODIUM CHLORIDE, POTASSIUM CHLORIDE, SODIUM ASCORBATE AND ASCORBIC ACID 100 G: KIT at 18:19

## 2025-02-27 NOTE — CARE COORDINATION
MET WITH PT AT BEDSIDE. DC PLAN REMAINS HOME W/HHC. FOC OFFERED, REFERRAL SENT TO [facility]. PENDING REVIEW.

## 2025-02-27 NOTE — TELEPHONE ENCOUNTER
Magaly from [facility] called to ask if provider will follow for homecare.    Magaly-  [phone]. Ext. [phone]

## 2025-02-27 NOTE — CONSULTS
Inpatient consult to GI  Consult performed by: Emely Reinoso APRN - CNP  Consult ordered by: Bertha Crawford APRN - CNP          Patient Name: Cande Richey  Admit Date: 2025  6:34 PM  MR #: [id number]  : 1960    Attending Physician: Angelito Roque MD  Reason for consult: ? GIB    History of Presenting Illness:      Cande Richey is a 64 y.o. female on hospital day 1 with a history of A-fib on Eliquis, hypertension, hyperlipidemia, thyroid disease.  Past surgical history of joint replacement, bladder stimulator surgery, thyroidectomy.  Family history is negative for GI malignancy.  Social history no nicotine, EtOH or illicit drug use. History Obtained From:  patient, electronic medical record  GI consult for GI bleed-patient was admitted for headache and ruptured blood vessels in the left eye, with suspicion for temporal arteritis patient has been initiated on steroids.  On arrival patient's hemoglobin was 9.1 & normocytic, which is her baseline however this morning it dropped to 7.7 and she reports a 4-year history of bright red blood per rectum associated with bowel movements and constipation, and Eliquis.  She had EGD and colonoscopy in 2022 while residing in [place], EGD was notable for esophageal stricture status post dilation, moderate-sized hiatal hernia and gastritis path was negative for H. pylori.  Colonoscopy was notable for internal hemorrhoids with evidence of active bleeding, rectal polyp and diverticulosis. No nocturnal pain or progressive abdominal pain. Denies unexplained weight loss, fever, or other systemic symptoms. No First-degree relative with  colorectal cancer.   History:      Past Medical History:   Diagnosis Date    Atrial fibrillation (HCC)     Hyperlipidemia     Hypertension     Thyroid disease      Past Surgical History:   Procedure Laterality Date    JOINT REPLACEMENT      STIMULATOR SURGERY N/A 2024    MEDTRONIC STAGE 1 AND STAGE 2 INTERSTIM performed by 
Renal consult dictated    Improving THERESE CKD 3b  Hyperkalemia  Headache left temporal cervical neck referral  AF  Hypertension  Hyperlipidemia  Lumbar pain chronic medtronic stim  Diastolic HF      Plan ultrasound kidneys maintain hydration  avoid nephrotoxic exposure urine albumin/Cr recheck      
Spiritual Health History and Assessment/Progress Note  [facility]    Spiritual/Emotional Needs,  ,  ,      Name: Cande Richey MRN: [id number]    Age: 64 y.o.     Sex: female   Language: English   Buddhism: Orthodox   Headache     Date: 2/26/2025            Total Time Calculated: 15 min              Spiritual Assessment began in [facility] 2W ORTHO TELE        Referral/Consult From: Nurse, Rounding   Encounter Overview/Reason: Spiritual/Emotional Needs  Service Provided For: Patient     reports, patient resting, coping, reports continued progress in hr treatments and care at this time. Patients, reports need for intentional self-care and self-awareness in her health and in giving the needed attention to the cares of her life and journey.     Patient reports, family support is based from out town due to [place] of her family being living aboard. Patient receptive and engaged to chaplains presence, prayers peace, stillness, self care, jaclyn.      Jaclyn, Belief, Meaning:   Patient has beliefs or practices that help with coping during difficult times  Family/Friends No family/friends present      Importance and Influence:  Patient has spiritual/personal beliefs that influence decisions regarding their health  Family/Friends No family/friends present    Community:  Patient feels well-supported. Support system includes: Children  Family/Friends No family/friends present    Assessment and Plan of Care:     Patient Interventions include: Facilitated expression of thoughts and feelings, Explored spiritual coping/struggle/distress, and Affirmed coping skills/support systems  Family/Friends Interventions include: No family/friends present    Patient Plan of Care: Spiritual Care available upon further referral  Family/Friends Plan of Care: No family/friends present    Electronically signed by Chaplain Royce on 2/26/2025 at 3:15 PM   
Normocephalic.  Pupils react to light.  Extraocular muscles intact.  NECK:  Supple.  No JVD or adenopathy.  LUNGS:  Clear.  No wheezing, rales, or rhonchi.  HEART:  Irregularly irregular.  ABDOMEN:  Soft.  No guarding or rigidity.  Bowel sounds are present.  EXTREMITIES:  Trace edema.   SKIN:  Warm and dry.  No cyanosis.  No varicosities.    IMPRESSIONS:    1. Acute kidney injury on chronic kidney disease 3B, suspected.  2. Hyperkalemia.  3. Anemia, etiology uncertain.  Possible gastrointestinal bleed.  4. Temporal headache, possibly related to cervical neck.  5. Atrial fibrillation, on Eliquis.  6. Hypertension.   7. Hyperlipidemia.    PLAN:  Ultrasound of the kidneys.  Maintain hydration.  Avoid nephrotoxic exposure.  Follow BMPs.  Urine albumin creatinine ratio to be obtained.          ELINA PEDRAZA DO      D:  02/27/2025 14:19:18     T:  02/27/2025 16:59:29     FELICITY/FILOMENA  Job #:  [id number]     Doc#:  [id number]     
ARACELI Bassettomatporfirio, American Board of Psychiatry & Neurology  Board Certified in Vascular Neurology  Board Certified in Neuromuscular Medicine  Certified in Neurorehabilitation           Collaborating physicians: Dr Whitmore    Electronically signed by TRAE Waggoner CNP on 2/26/2025 at 9:39 AM

## 2025-02-28 ENCOUNTER — APPOINTMENT (OUTPATIENT)
Dept: ULTRASOUND IMAGING | Age: 65
DRG: 103 | End: 2025-02-28
Payer: COMMERCIAL

## 2025-02-28 ENCOUNTER — ANESTHESIA EVENT (OUTPATIENT)
Dept: ENDOSCOPY | Age: 65
End: 2025-02-28
Payer: COMMERCIAL

## 2025-02-28 ENCOUNTER — ANESTHESIA (OUTPATIENT)
Dept: ENDOSCOPY | Age: 65
End: 2025-02-28
Payer: COMMERCIAL

## 2025-02-28 PROBLEM — K62.5 BLOOD PER RECTUM: Status: ACTIVE | Noted: 2025-02-28

## 2025-02-28 LAB
ALBUMIN SERPL-MCNC: 3.4 G/DL (ref 3.5–4.6)
ALP SERPL-CCNC: 47 U/L (ref 40–130)
ALT SERPL-CCNC: 15 U/L (ref 0–33)
ANION GAP SERPL CALCULATED.3IONS-SCNC: 10 MEQ/L (ref 9–15)
AST SERPL-CCNC: 17 U/L (ref 0–35)
BASOPHILS # BLD: 0 K/UL (ref 0–0.2)
BASOPHILS NFR BLD: 0.1 %
BILIRUB SERPL-MCNC: <0.2 MG/DL (ref 0.2–0.7)
BUN SERPL-MCNC: 56 MG/DL (ref 8–23)
CALCIUM SERPL-MCNC: 9.5 MG/DL (ref 8.5–9.9)
CHLORIDE SERPL-SCNC: 109 MEQ/L (ref 95–107)
CO2 SERPL-SCNC: 22 MEQ/L (ref 20–31)
CREAT SERPL-MCNC: 1.32 MG/DL (ref 0.5–0.9)
EOSINOPHIL # BLD: 0 K/UL (ref 0–0.7)
EOSINOPHIL NFR BLD: 0 %
ERYTHROCYTE [DISTWIDTH] IN BLOOD BY AUTOMATED COUNT: 16.2 % (ref 11.5–14.5)
GLOBULIN SER CALC-MCNC: 2.7 G/DL (ref 2.3–3.5)
GLUCOSE BLD-MCNC: 109 MG/DL (ref 70–99)
GLUCOSE BLD-MCNC: 130 MG/DL (ref 70–99)
GLUCOSE BLD-MCNC: 88 MG/DL (ref 70–99)
GLUCOSE SERPL-MCNC: 125 MG/DL (ref 70–99)
HCT VFR BLD AUTO: 23.5 % (ref 37–47)
HGB BLD-MCNC: 7.5 G/DL (ref 12–16)
LYMPHOCYTES # BLD: 1.2 K/UL (ref 1–4.8)
LYMPHOCYTES NFR BLD: 12.4 %
MAGNESIUM SERPL-MCNC: 2.3 MG/DL (ref 1.7–2.4)
MCH RBC QN AUTO: 27.2 PG (ref 27–31.3)
MCHC RBC AUTO-ENTMCNC: 31.9 % (ref 33–37)
MCV RBC AUTO: 85.1 FL (ref 79.4–94.8)
MONOCYTES # BLD: 0.3 K/UL (ref 0.2–0.8)
MONOCYTES NFR BLD: 3.5 %
NEUTROPHILS # BLD: 7.9 K/UL (ref 1.4–6.5)
NEUTS SEG NFR BLD: 83.6 %
PERFORMED ON: ABNORMAL
PERFORMED ON: ABNORMAL
PERFORMED ON: NORMAL
PLATELET # BLD AUTO: 236 K/UL (ref 130–400)
POTASSIUM SERPL-SCNC: 5 MEQ/L (ref 3.4–4.9)
PROT SERPL-MCNC: 6.1 G/DL (ref 6.3–8)
RBC # BLD AUTO: 2.76 M/UL (ref 4.2–5.4)
SODIUM SERPL-SCNC: 141 MEQ/L (ref 135–144)
WBC # BLD AUTO: 9.5 K/UL (ref 4.8–10.8)

## 2025-02-28 PROCEDURE — 2500000003 HC RX 250 WO HCPCS: Performed by: INTERNAL MEDICINE

## 2025-02-28 PROCEDURE — 7100000010 HC PHASE II RECOVERY - FIRST 15 MIN: Performed by: INTERNAL MEDICINE

## 2025-02-28 PROCEDURE — 83735 ASSAY OF MAGNESIUM: CPT

## 2025-02-28 PROCEDURE — 36415 COLL VENOUS BLD VENIPUNCTURE: CPT

## 2025-02-28 PROCEDURE — 85025 COMPLETE CBC W/AUTO DIFF WBC: CPT

## 2025-02-28 PROCEDURE — 99024 POSTOP FOLLOW-UP VISIT: CPT | Performed by: NURSE PRACTITIONER

## 2025-02-28 PROCEDURE — 6360000002 HC RX W HCPCS: Performed by: NURSE ANESTHETIST, CERTIFIED REGISTERED

## 2025-02-28 PROCEDURE — 1210000000 HC MED SURG R&B

## 2025-02-28 PROCEDURE — 80053 COMPREHEN METABOLIC PANEL: CPT

## 2025-02-28 PROCEDURE — 2500000003 HC RX 250 WO HCPCS

## 2025-02-28 PROCEDURE — 3700000001 HC ADD 15 MINUTES (ANESTHESIA): Performed by: INTERNAL MEDICINE

## 2025-02-28 PROCEDURE — 6370000000 HC RX 637 (ALT 250 FOR IP): Performed by: STUDENT IN AN ORGANIZED HEALTH CARE EDUCATION/TRAINING PROGRAM

## 2025-02-28 PROCEDURE — 3700000000 HC ANESTHESIA ATTENDED CARE: Performed by: INTERNAL MEDICINE

## 2025-02-28 PROCEDURE — 45378 DIAGNOSTIC COLONOSCOPY: CPT | Performed by: INTERNAL MEDICINE

## 2025-02-28 PROCEDURE — 2580000003 HC RX 258: Performed by: NURSE PRACTITIONER

## 2025-02-28 PROCEDURE — 0DJD8ZZ INSPECTION OF LOWER INTESTINAL TRACT, VIA NATURAL OR ARTIFICIAL OPENING ENDOSCOPIC: ICD-10-PCS | Performed by: INTERNAL MEDICINE

## 2025-02-28 PROCEDURE — 76775 US EXAM ABDO BACK WALL LIM: CPT

## 2025-02-28 PROCEDURE — 2709999900 HC NON-CHARGEABLE SUPPLY: Performed by: INTERNAL MEDICINE

## 2025-02-28 PROCEDURE — 6370000000 HC RX 637 (ALT 250 FOR IP): Performed by: NURSE PRACTITIONER

## 2025-02-28 PROCEDURE — 2580000003 HC RX 258: Performed by: NURSE ANESTHETIST, CERTIFIED REGISTERED

## 2025-02-28 PROCEDURE — 7100000011 HC PHASE II RECOVERY - ADDTL 15 MIN: Performed by: INTERNAL MEDICINE

## 2025-02-28 PROCEDURE — 6370000000 HC RX 637 (ALT 250 FOR IP)

## 2025-02-28 PROCEDURE — 3609027000 HC COLONOSCOPY: Performed by: INTERNAL MEDICINE

## 2025-02-28 RX ORDER — SODIUM CHLORIDE 0.9 % (FLUSH) 0.9 %
5-40 SYRINGE (ML) INJECTION EVERY 12 HOURS SCHEDULED
Status: DISCONTINUED | OUTPATIENT
Start: 2025-02-28 | End: 2025-02-28 | Stop reason: HOSPADM

## 2025-02-28 RX ORDER — SODIUM CHLORIDE 0.9 % (FLUSH) 0.9 %
5-40 SYRINGE (ML) INJECTION PRN
Status: DISCONTINUED | OUTPATIENT
Start: 2025-02-28 | End: 2025-02-28 | Stop reason: HOSPADM

## 2025-02-28 RX ORDER — SODIUM CHLORIDE 9 MG/ML
INJECTION, SOLUTION INTRAVENOUS
Status: DISCONTINUED | OUTPATIENT
Start: 2025-02-28 | End: 2025-02-28 | Stop reason: SDUPTHER

## 2025-02-28 RX ORDER — SODIUM CHLORIDE 0.9 % (FLUSH) 0.9 %
5-40 SYRINGE (ML) INJECTION EVERY 12 HOURS SCHEDULED
Status: CANCELLED | OUTPATIENT
Start: 2025-02-28

## 2025-02-28 RX ORDER — PROPOFOL 10 MG/ML
INJECTION, EMULSION INTRAVENOUS
Status: DISCONTINUED | OUTPATIENT
Start: 2025-02-28 | End: 2025-02-28 | Stop reason: SDUPTHER

## 2025-02-28 RX ORDER — NALOXONE HYDROCHLORIDE 0.4 MG/ML
INJECTION, SOLUTION INTRAMUSCULAR; INTRAVENOUS; SUBCUTANEOUS PRN
Status: CANCELLED | OUTPATIENT
Start: 2025-02-28

## 2025-02-28 RX ORDER — SODIUM CHLORIDE 9 MG/ML
25 INJECTION, SOLUTION INTRAVENOUS PRN
Status: DISCONTINUED | OUTPATIENT
Start: 2025-02-28 | End: 2025-02-28 | Stop reason: HOSPADM

## 2025-02-28 RX ORDER — SODIUM CHLORIDE 0.9 % (FLUSH) 0.9 %
5-40 SYRINGE (ML) INJECTION PRN
Status: CANCELLED | OUTPATIENT
Start: 2025-02-28

## 2025-02-28 RX ORDER — LIDOCAINE HYDROCHLORIDE 20 MG/ML
INJECTION, SOLUTION INFILTRATION; PERINEURAL
Status: DISCONTINUED | OUTPATIENT
Start: 2025-02-28 | End: 2025-02-28 | Stop reason: SDUPTHER

## 2025-02-28 RX ORDER — SODIUM CHLORIDE 9 MG/ML
INJECTION, SOLUTION INTRAVENOUS PRN
Status: CANCELLED | OUTPATIENT
Start: 2025-02-28

## 2025-02-28 RX ADMIN — HYDROCORTISONE ACETATE 25 MG: 25 SUPPOSITORY RECTAL at 21:47

## 2025-02-28 RX ADMIN — PANTOPRAZOLE SODIUM 40 MG: 40 TABLET, DELAYED RELEASE ORAL at 06:28

## 2025-02-28 RX ADMIN — TROSPIUM CHLORIDE 20 MG: 20 TABLET, FILM COATED ORAL at 06:28

## 2025-02-28 RX ADMIN — LIDOCAINE HYDROCHLORIDE 50 MG: 20 INJECTION, SOLUTION INFILTRATION; PERINEURAL at 12:01

## 2025-02-28 RX ADMIN — METOPROLOL TARTRATE 50 MG: 50 TABLET, FILM COATED ORAL at 08:44

## 2025-02-28 RX ADMIN — SODIUM CHLORIDE 25 ML: 0.9 INJECTION, SOLUTION INTRAVENOUS at 12:01

## 2025-02-28 RX ADMIN — Medication 3 MG: at 21:47

## 2025-02-28 RX ADMIN — LEVOTHYROXINE SODIUM 200 MCG: 0.1 TABLET ORAL at 08:44

## 2025-02-28 RX ADMIN — TROSPIUM CHLORIDE 20 MG: 20 TABLET, FILM COATED ORAL at 17:12

## 2025-02-28 RX ADMIN — SODIUM CHLORIDE: 0.9 INJECTION, SOLUTION INTRAVENOUS at 11:57

## 2025-02-28 RX ADMIN — PREDNISONE 60 MG: 20 TABLET ORAL at 14:31

## 2025-02-28 RX ADMIN — ATORVASTATIN CALCIUM 40 MG: 40 TABLET, FILM COATED ORAL at 21:47

## 2025-02-28 RX ADMIN — Medication 10 ML: at 08:46

## 2025-02-28 RX ADMIN — METOPROLOL TARTRATE 50 MG: 50 TABLET, FILM COATED ORAL at 21:47

## 2025-02-28 RX ADMIN — PROPOFOL 250 MG: 10 INJECTION, EMULSION INTRAVENOUS at 12:01

## 2025-02-28 RX ADMIN — FLECAINIDE ACETATE 25 MG: 50 TABLET ORAL at 21:47

## 2025-02-28 ASSESSMENT — PAIN - FUNCTIONAL ASSESSMENT
PAIN_FUNCTIONAL_ASSESSMENT: NONE - DENIES PAIN
PAIN_FUNCTIONAL_ASSESSMENT: 0-10

## 2025-02-28 NOTE — PLAN OF CARE
Problem: Discharge Planning  Goal: Discharge to home or other facility with appropriate resources  2/28/2025 1313 by Gladys Capellan RN  Outcome: Progressing     Problem: Pain  Goal: Verbalizes/displays adequate comfort level or baseline comfort level  2/28/2025 1313 by Gladys Capellan RN  Outcome: Progressing     Problem: Skin/Tissue Integrity  Goal: Skin integrity remains intact  Description: 1.  Monitor for areas of redness and/or skin breakdown  2.  Assess vascular access sites hourly  3.  Every 4-6 hours minimum:  Change oxygen saturation probe site  4.  Every 4-6 hours:  If on nasal continuous positive airway pressure, respiratory therapy assess nares and determine need for appliance change or resting period  2/28/2025 1313 by Gladys Capellan RN  Outcome: Progressing     Problem: ABCDS Injury Assessment  Goal: Absence of physical injury  2/28/2025 1313 by Gladys Capellan RN  Outcome: Progressing     Problem: Safety - Adult  Goal: Free from fall injury  2/28/2025 1313 by Gladys Capellan RN  Outcome: Progressing

## 2025-02-28 NOTE — ANESTHESIA PRE PROCEDURE
Department of Anesthesiology  Preprocedure Note       Name:  Cande Richey   Age:  64 y.o.  :  1960                                          MRN:  [id number]         Date:  2025      Surgeon: Surgeon(s):  Mariah Nelson MD    Procedure: Procedure(s):  COLONOSCOPY DIAGNOSTIC    Medications prior to admission:   Prior to Admission medications    Medication Sig Start Date End Date Taking? Authorizing Provider   TIRZEPATIDE-WEIGHT MANAGEMENT SC Inject 0.3 mg into the skin once a week   Yes Provider, MD Arley   folic acid (FOLVITE) 1 MG tablet TAKE 1 TABLET BY MOUTH ONCE DAILY 25  Yes Kirsten Aguilera APRN - CNP   Fesoterodine Fumarate ER 8 MG TB24 Take 1 tablet by mouth daily 24  Yes Sabine Bradford MD   febuxostat (ULORIC) 80 MG TABS tablet TAKE 1 TABLET BY MOUTH DAILY 24  Yes Kirsten Aguilera APRN - CNP   atorvastatin (LIPITOR) 40 MG tablet TAKE 1 TABLET BY MOUTH NIGHTLY 24  Yes Kirsten Aguilera APRN - CNP   levothyroxine (SYNTHROID) 200 MCG tablet TAKE 1 TABLET BY MOUTH DAILY 24  Yes Kirsten Aguilera APRN - CNP   spironolactone (ALDACTONE) 25 MG tablet Take 1 tablet by mouth daily 10/24/24  Yes Kirsten Aguilera APRN - CNP   apixaban (ELIQUIS) 5 MG TABS tablet Take 1 tablet by mouth 2 times daily Indications: Preventative Treatment 10/22/24  Yes Beverley Adam DO   furosemide (LASIX) 20 MG tablet Take 1 tablet by mouth daily 24  Yes Kirsten Aguilera APRN - CNP   ondansetron (ZOFRAN-ODT) 4 MG disintegrating tablet Take 1 tablet by mouth 3 times daily as needed for Nausea or Vomiting 24  Yes Kirsten Aguilera APRN - CNP   omeprazole (PRILOSEC) 40 MG delayed release capsule TAKE 1 CAPSULE BY MOUTH DAILY 24  Yes Kirsten Aguilera APRN - CNP   fosfomycin tromethamine (MONUROL) 3 g PACK Take 1 packet by mouth every 10 days 7/15/24  Yes Sabine Bradford MD   mirabegron (MYRBETRIQ) 50 MG TB24 Take 50 mg by mouth daily 24  Yes Sabine Bradford,

## 2025-02-28 NOTE — ANESTHESIA POSTPROCEDURE EVALUATION
Department of Anesthesiology  Postprocedure Note    Patient: Cande Richey  MRN: [id number]  YOB: 1960  Date of evaluation: 2/28/2025    Procedure Summary       Date: 02/28/25 Room / Location: [facility] OR 01 / [facility]    Anesthesia Start: 1157 Anesthesia Stop: 1216    Procedure: COLONOSCOPY DIAGNOSTIC Diagnosis:       GIB (gastrointestinal bleeding)      (GIB (gastrointestinal bleeding) [K92.2])    Surgeons: Mariah Nelson MD Responsible Provider: Fina Angeles APRN - CRNA    Anesthesia Type: MAC ASA Status: 3 - Emergent            Anesthesia Type: No value filed.    Porsha Phase I: Porsha Score: 10    Porsha Phase II:      Anesthesia Post Evaluation    Patient location during evaluation: bedside  Patient participation: complete - patient participated  Level of consciousness: awake and awake and alert  Pain score: 0  Airway patency: patent  Nausea & Vomiting: no nausea and no vomiting  Cardiovascular status: blood pressure returned to baseline and hemodynamically stable  Respiratory status: acceptable  Hydration status: euvolemic  Pain management: adequate        No notable events documented.

## 2025-02-28 NOTE — CARE COORDINATION
DC PLAN REMAINS HOME W/[facility]. THEY HAVE ACCEPTED THE PT, FOLLOWING FOR DC DATE. PT FOR COLONOSCOPY TODAY.   CALL PLACED TO [facility], UPDATED ON PLAN OF CARE.

## 2025-02-28 NOTE — PLAN OF CARE
Problem: Discharge Planning  Goal: Discharge to home or other facility with appropriate resources  2/27/2025 2353 by Radha Hankins RN  Outcome: Progressing  2/27/2025 1041 by Gladys Capellan RN  Outcome: Progressing     Problem: Pain  Goal: Verbalizes/displays adequate comfort level or baseline comfort level  2/27/2025 2353 by Radha Hankins RN  Outcome: Progressing  2/27/2025 1041 by Gladys Capellan RN  Outcome: Progressing     Problem: Skin/Tissue Integrity  Goal: Skin integrity remains intact  Description: 1.  Monitor for areas of redness and/or skin breakdown  2.  Assess vascular access sites hourly  3.  Every 4-6 hours minimum:  Change oxygen saturation probe site  4.  Every 4-6 hours:  If on nasal continuous positive airway pressure, respiratory therapy assess nares and determine need for appliance change or resting period  2/27/2025 2353 by Radha Hankins RN  Outcome: Progressing  2/27/2025 1041 by Gladys Capellan RN  Outcome: Progressing     Problem: ABCDS Injury Assessment  Goal: Absence of physical injury  2/27/2025 2353 by Radha Hankins RN  Outcome: Progressing  2/27/2025 1041 by Gladys Capellan RN  Outcome: Progressing     Problem: Safety - Adult  Goal: Free from fall injury  2/27/2025 2353 by Radha Hankins RN  Outcome: Progressing  2/27/2025 1041 by Gladys Capellan RN  Outcome: Progressing

## 2025-03-01 PROBLEM — K64.8 OTHER HEMORRHOIDS: Status: ACTIVE | Noted: 2025-03-01

## 2025-03-01 PROBLEM — K92.1 HEMATOCHEZIA: Status: ACTIVE | Noted: 2025-03-01

## 2025-03-01 LAB
ALBUMIN SERPL-MCNC: 3.3 G/DL (ref 3.5–4.6)
ALP SERPL-CCNC: 50 U/L (ref 40–130)
ALT SERPL-CCNC: 15 U/L (ref 0–33)
ANION GAP SERPL CALCULATED.3IONS-SCNC: 11 MEQ/L (ref 9–15)
AST SERPL-CCNC: 11 U/L (ref 0–35)
BASOPHILS # BLD: 0 K/UL (ref 0–0.2)
BASOPHILS NFR BLD: 0 %
BILIRUB SERPL-MCNC: <0.2 MG/DL (ref 0.2–0.7)
BUN SERPL-MCNC: 41 MG/DL (ref 8–23)
CALCIUM SERPL-MCNC: 9.5 MG/DL (ref 8.5–9.9)
CHLORIDE SERPL-SCNC: 112 MEQ/L (ref 95–107)
CO2 SERPL-SCNC: 20 MEQ/L (ref 20–31)
CREAT SERPL-MCNC: 1.15 MG/DL (ref 0.5–0.9)
EOSINOPHIL # BLD: 0 K/UL (ref 0–0.7)
EOSINOPHIL NFR BLD: 0 %
ERYTHROCYTE [DISTWIDTH] IN BLOOD BY AUTOMATED COUNT: 16.2 % (ref 11.5–14.5)
GLOBULIN SER CALC-MCNC: 2.5 G/DL (ref 2.3–3.5)
GLUCOSE BLD-MCNC: 114 MG/DL (ref 70–99)
GLUCOSE BLD-MCNC: 117 MG/DL (ref 70–99)
GLUCOSE BLD-MCNC: 134 MG/DL (ref 70–99)
GLUCOSE BLD-MCNC: 229 MG/DL (ref 70–99)
GLUCOSE SERPL-MCNC: 120 MG/DL (ref 70–99)
HCT VFR BLD AUTO: 24.3 % (ref 37–47)
HGB BLD-MCNC: 7.7 G/DL (ref 12–16)
IRON % SATURATION: 7 % (ref 20–55)
IRON: 22 UG/DL (ref 37–145)
LYMPHOCYTES # BLD: 1 K/UL (ref 1–4.8)
LYMPHOCYTES NFR BLD: 18.3 %
MCH RBC QN AUTO: 26.7 PG (ref 27–31.3)
MCHC RBC AUTO-ENTMCNC: 31.7 % (ref 33–37)
MCV RBC AUTO: 84.4 FL (ref 79.4–94.8)
MONOCYTES # BLD: 0.3 K/UL (ref 0.2–0.8)
MONOCYTES NFR BLD: 4.7 %
NEUTROPHILS # BLD: 4.2 K/UL (ref 1.4–6.5)
NEUTS SEG NFR BLD: 75.9 %
PERFORMED ON: ABNORMAL
PLATELET # BLD AUTO: 235 K/UL (ref 130–400)
POTASSIUM SERPL-SCNC: 4.7 MEQ/L (ref 3.4–4.9)
PROT SERPL-MCNC: 5.8 G/DL (ref 6.3–8)
RBC # BLD AUTO: 2.88 M/UL (ref 4.2–5.4)
SODIUM SERPL-SCNC: 143 MEQ/L (ref 135–144)
TOTAL IRON BINDING CAPACITY: 315 UG/DL (ref 250–450)
UNSATURATED IRON BINDING CAPACITY: 293 UG/DL (ref 112–347)
WBC # BLD AUTO: 5.6 K/UL (ref 4.8–10.8)

## 2025-03-01 PROCEDURE — 6370000000 HC RX 637 (ALT 250 FOR IP): Performed by: NURSE PRACTITIONER

## 2025-03-01 PROCEDURE — 99232 SBSQ HOSP IP/OBS MODERATE 35: CPT | Performed by: PSYCHIATRY & NEUROLOGY

## 2025-03-01 PROCEDURE — 6370000000 HC RX 637 (ALT 250 FOR IP)

## 2025-03-01 PROCEDURE — 1210000000 HC MED SURG R&B

## 2025-03-01 PROCEDURE — 82607 VITAMIN B-12: CPT

## 2025-03-01 PROCEDURE — 83540 ASSAY OF IRON: CPT

## 2025-03-01 PROCEDURE — 99231 SBSQ HOSP IP/OBS SF/LOW 25: CPT | Performed by: INTERNAL MEDICINE

## 2025-03-01 PROCEDURE — 6370000000 HC RX 637 (ALT 250 FOR IP): Performed by: STUDENT IN AN ORGANIZED HEALTH CARE EDUCATION/TRAINING PROGRAM

## 2025-03-01 PROCEDURE — 36415 COLL VENOUS BLD VENIPUNCTURE: CPT

## 2025-03-01 PROCEDURE — 85025 COMPLETE CBC W/AUTO DIFF WBC: CPT

## 2025-03-01 PROCEDURE — 82746 ASSAY OF FOLIC ACID SERUM: CPT

## 2025-03-01 PROCEDURE — 83550 IRON BINDING TEST: CPT

## 2025-03-01 PROCEDURE — 80053 COMPREHEN METABOLIC PANEL: CPT

## 2025-03-01 RX ADMIN — PREDNISONE 60 MG: 20 TABLET ORAL at 07:56

## 2025-03-01 RX ADMIN — METOPROLOL TARTRATE 50 MG: 50 TABLET, FILM COATED ORAL at 20:09

## 2025-03-01 RX ADMIN — HYDROCORTISONE ACETATE 25 MG: 25 SUPPOSITORY RECTAL at 20:09

## 2025-03-01 RX ADMIN — TROSPIUM CHLORIDE 20 MG: 20 TABLET, FILM COATED ORAL at 18:15

## 2025-03-01 RX ADMIN — FLECAINIDE ACETATE 25 MG: 50 TABLET ORAL at 20:09

## 2025-03-01 RX ADMIN — PANTOPRAZOLE SODIUM 40 MG: 40 TABLET, DELAYED RELEASE ORAL at 05:57

## 2025-03-01 RX ADMIN — ATORVASTATIN CALCIUM 40 MG: 40 TABLET, FILM COATED ORAL at 20:09

## 2025-03-01 RX ADMIN — FOLIC ACID 1000 MCG: 1 TABLET ORAL at 07:56

## 2025-03-01 RX ADMIN — LEVOTHYROXINE SODIUM 200 MCG: 0.1 TABLET ORAL at 05:57

## 2025-03-01 RX ADMIN — FLECAINIDE ACETATE 25 MG: 50 TABLET ORAL at 07:56

## 2025-03-01 RX ADMIN — Medication 3 MG: at 20:09

## 2025-03-01 RX ADMIN — METOPROLOL TARTRATE 50 MG: 50 TABLET, FILM COATED ORAL at 07:56

## 2025-03-01 RX ADMIN — TROSPIUM CHLORIDE 20 MG: 20 TABLET, FILM COATED ORAL at 05:57

## 2025-03-01 RX ADMIN — ACETAMINOPHEN 650 MG: 325 TABLET ORAL at 07:56

## 2025-03-01 ASSESSMENT — PAIN DESCRIPTION - LOCATION: LOCATION: HEAD

## 2025-03-01 ASSESSMENT — PAIN SCALES - GENERAL: PAINLEVEL_OUTOF10: 3

## 2025-03-01 ASSESSMENT — PAIN DESCRIPTION - ORIENTATION: ORIENTATION: LEFT

## 2025-03-01 ASSESSMENT — PAIN DESCRIPTION - DESCRIPTORS: DESCRIPTORS: ACHING

## 2025-03-02 VITALS
HEIGHT: 67 IN | RESPIRATION RATE: 18 BRPM | OXYGEN SATURATION: 96 % | TEMPERATURE: 98.2 F | DIASTOLIC BLOOD PRESSURE: 93 MMHG | HEART RATE: 78 BPM | BODY MASS INDEX: 45.99 KG/M2 | SYSTOLIC BLOOD PRESSURE: 141 MMHG | WEIGHT: 293 LBS

## 2025-03-02 LAB
ALBUMIN SERPL-MCNC: 3.4 G/DL (ref 3.5–4.6)
ALP SERPL-CCNC: 51 U/L (ref 40–130)
ALT SERPL-CCNC: 19 U/L (ref 0–33)
ANION GAP SERPL CALCULATED.3IONS-SCNC: 10 MEQ/L (ref 9–15)
AST SERPL-CCNC: 13 U/L (ref 0–35)
BASOPHILS # BLD: 0 K/UL (ref 0–0.2)
BASOPHILS NFR BLD: 0 %
BILIRUB SERPL-MCNC: 0.3 MG/DL (ref 0.2–0.7)
BUN SERPL-MCNC: 31 MG/DL (ref 8–23)
CALCIUM SERPL-MCNC: 9.6 MG/DL (ref 8.5–9.9)
CHLORIDE SERPL-SCNC: 108 MEQ/L (ref 95–107)
CO2 SERPL-SCNC: 21 MEQ/L (ref 20–31)
CREAT SERPL-MCNC: 1.21 MG/DL (ref 0.5–0.9)
EOSINOPHIL # BLD: 0 K/UL (ref 0–0.7)
EOSINOPHIL NFR BLD: 0 %
ERYTHROCYTE [DISTWIDTH] IN BLOOD BY AUTOMATED COUNT: 16.2 % (ref 11.5–14.5)
FOLATE: >40 NG/ML (ref 4.8–24.2)
GLOBULIN SER CALC-MCNC: 2.5 G/DL (ref 2.3–3.5)
GLUCOSE BLD-MCNC: 148 MG/DL (ref 70–99)
GLUCOSE BLD-MCNC: 95 MG/DL (ref 70–99)
GLUCOSE SERPL-MCNC: 87 MG/DL (ref 70–99)
HCT VFR BLD AUTO: 25.5 % (ref 37–47)
HGB BLD-MCNC: 7.7 G/DL (ref 12–16)
LYMPHOCYTES # BLD: 2 K/UL (ref 1–4.8)
LYMPHOCYTES NFR BLD: 27 %
MAGNESIUM SERPL-MCNC: 2 MG/DL (ref 1.7–2.4)
MCH RBC QN AUTO: 25.4 PG (ref 27–31.3)
MCHC RBC AUTO-ENTMCNC: 30.2 % (ref 33–37)
MCV RBC AUTO: 84.2 FL (ref 79.4–94.8)
MONOCYTES # BLD: 0.7 K/UL (ref 0.2–0.8)
MONOCYTES NFR BLD: 9.3 %
NEUTROPHILS # BLD: 4.6 K/UL (ref 1.4–6.5)
NEUTS SEG NFR BLD: 63 %
PERFORMED ON: ABNORMAL
PERFORMED ON: NORMAL
PHOSPHATE SERPL-MCNC: 2.1 MG/DL (ref 2.3–4.8)
PLATELET # BLD AUTO: 244 K/UL (ref 130–400)
POTASSIUM SERPL-SCNC: 4.3 MEQ/L (ref 3.4–4.9)
POTASSIUM SERPL-SCNC: 4.3 MEQ/L (ref 3.4–4.9)
PROT SERPL-MCNC: 5.9 G/DL (ref 6.3–8)
RBC # BLD AUTO: 3.03 M/UL (ref 4.2–5.4)
SODIUM SERPL-SCNC: 139 MEQ/L (ref 135–144)
VITAMIN B-12: 962 PG/ML (ref 232–1245)
WBC # BLD AUTO: 7.3 K/UL (ref 4.8–10.8)

## 2025-03-02 PROCEDURE — 6370000000 HC RX 637 (ALT 250 FOR IP): Performed by: STUDENT IN AN ORGANIZED HEALTH CARE EDUCATION/TRAINING PROGRAM

## 2025-03-02 PROCEDURE — 80053 COMPREHEN METABOLIC PANEL: CPT

## 2025-03-02 PROCEDURE — 83735 ASSAY OF MAGNESIUM: CPT

## 2025-03-02 PROCEDURE — 85025 COMPLETE CBC W/AUTO DIFF WBC: CPT

## 2025-03-02 PROCEDURE — 6370000000 HC RX 637 (ALT 250 FOR IP)

## 2025-03-02 PROCEDURE — 36415 COLL VENOUS BLD VENIPUNCTURE: CPT

## 2025-03-02 RX ORDER — HYDROCORTISONE ACETATE 25 MG/1
25 SUPPOSITORY RECTAL NIGHTLY
Qty: 11 SUPPOSITORY | Refills: 0 | Status: SHIPPED | OUTPATIENT
Start: 2025-03-02 | End: 2025-03-13

## 2025-03-02 RX ORDER — PREDNISONE 20 MG/1
60 TABLET ORAL DAILY
Qty: 30 TABLET | Refills: 0 | Status: SHIPPED | OUTPATIENT
Start: 2025-03-03 | End: 2025-03-13

## 2025-03-02 RX ORDER — FERROUS SULFATE 325(65) MG
325 TABLET ORAL
Qty: 90 TABLET | Refills: 1 | Status: SHIPPED | OUTPATIENT
Start: 2025-03-02

## 2025-03-02 RX ADMIN — LEVOTHYROXINE SODIUM 200 MCG: 0.1 TABLET ORAL at 06:20

## 2025-03-02 RX ADMIN — FOLIC ACID 1000 MCG: 1 TABLET ORAL at 08:33

## 2025-03-02 RX ADMIN — TROSPIUM CHLORIDE 20 MG: 20 TABLET, FILM COATED ORAL at 06:20

## 2025-03-02 RX ADMIN — PREDNISONE 60 MG: 20 TABLET ORAL at 08:33

## 2025-03-02 RX ADMIN — PANTOPRAZOLE SODIUM 40 MG: 40 TABLET, DELAYED RELEASE ORAL at 06:20

## 2025-03-02 RX ADMIN — FLECAINIDE ACETATE 25 MG: 50 TABLET ORAL at 08:33

## 2025-03-02 RX ADMIN — METOPROLOL TARTRATE 50 MG: 50 TABLET, FILM COATED ORAL at 08:33

## 2025-03-02 NOTE — DISCHARGE SUMMARY
Hospital Medicine Discharge Summary    Cande Richey  :  1960  MRN:  [id number]    Admit date:  2025  Discharge date:  3/2/2025    Admitting Physician:  Faviola Posey MD  Primary Care Physician:  Kirsten Aguilera, APRN - CNP      Discharge Diagnoses:    Principal Problem:    Headache  Active Problems:    Conjunctival hemorrhage of left eye    Blood per rectum    Hematochezia    Other hemorrhoids  Resolved Problems:    * No resolved hospital problems. *      Hospital Course:   Cande Richey is a 64 y.o. female that was admitted and treated at [facility] for the following medical issues:     Principal Problem:    Headache  Active Problems:    Conjunctival hemorrhage of left eye    Blood per rectum    Hematochezia    Other hemorrhoids  Resolved Problems:    * No resolved hospital problems. *    Cande Richey is a 64 y.o. female with a PMHx of hypertension, hyperlipidemia, A-fib on Eliquis, thyroid disease, and peripheral edema per the patient and EMR review, presents with a chief complaint of headache     Headache  Frontal/temporal left-sided headache  Conjunctival hemorrhage in L eye. Eliquis on hold for now, resume when appropriate as outpatient   Denies vision loss / change  ESR: 68  CRP: <3  CT head: No acute intracranial abnormality noted.   Suspect temporal arteritis vs cervicogenic headache. Discussed with neurologist who favors the latter as we would typically expect higher CRP in temporal arteritis   Admit to [facility] with continuous telemetry  Neurology following   60 mg prednisone p.o. daily for short course  Every shift neurochecks  Per neurology daniel: suspect trapezius muscle tenderness though she has prominent temporal arteries tenderness.  By lab criteria does not appear to suggest temporal arteritis.  I recommended that we finish a short course of prednisone for now just for her cervicogenic pain and then will wait 4 weeks and then consider further evaluation

## 2025-03-02 NOTE — FLOWSHEET NOTE
Discharge instructions given. Understanding verbalized. IV removed, Tele removed and sent back to 1wt. Aware of medications at the pharmacy and follow up appointments. Discharge to home with family.

## 2025-03-03 ENCOUNTER — TELEPHONE (OUTPATIENT)
Age: 65
End: 2025-03-03

## 2025-03-03 NOTE — TELEPHONE ENCOUNTER
Please call patient to ask what questions she may have about medications prior to seeing Rosalina on 3/12.

## 2025-03-03 NOTE — TELEPHONE ENCOUNTER
Care Transitions Initial Follow Up Call    Outreach made within 2 business days of discharge: Yes    Patient: Cande Richey Patient : 1960   MRN: [id number]  Reason for Admission: Headache   Discharge Date: 3/2/25       Spoke with: PT     Discharge department/facility: [facility]    TCM Interactive Patient Contact:  Was patient able to fill all prescriptions: Yes  Was patient instructed to bring all medications to the follow-up visit: Yes  Is patient taking all medications as directed in the discharge summary? Yes  Does patient understand their discharge instructions: Yes  Does patient have questions or concerns that need addressed prior to 7-14 day follow up office visit: yes - HAS QUESTIONS ABOUT MEDICATIONS that was stopped. She wants to know if and when she goes back on them that this will not put her back in the hospital. Medications stopped list STOP taking:  apixaban 5 MG Tabs tablet (ELIQUIS)  folic acid 1 MG tablet (FOLVITE)  olmesartan 40 MG tablet (BENICAR)  spironolactone 25 MG tablet (ALDACTONE)    Additional needs identified to be addressed with provider  HAS QUESTIONS ABOUT MEDICATIONS that was stopped. She wants to know if and when she goes back on them that this will not put her back in the hospital. Medications stopped list STOP taking:  apixaban 5 MG Tabs tablet (ELIQUIS)  folic acid 1 MG tablet (FOLVITE)  olmesartan 40 MG tablet (BENICAR)  spironolactone 25 MG tablet (ALDACTONE)                 Scheduled appointment with PCP within 7-14 days    Follow Up  Future Appointments   Date Time Provider Department Center   3/12/2025  9:30 AM Kirsten Aguilera, APRN - CNP [facility]   2025  1:00 PM Beverley Adam DO [facility], [place]

## 2025-03-03 NOTE — PROGRESS NOTES
Nutrition Note    Noted diagnosis of hyperkalemia, current K+ remains elevated, ' low potassium ( 3000 mg) ' added to current diet order    Electronically signed by TERESA FU RD, LD on 2/27/25 at 7:23 AM EST    
Gastroenterology Progress Note    Cande Richey is a 64 y.o. female patient.  Hospitalization Day:2    Chief C/O: Hematochezia    SUBJECTIVE: Seen and examined, tolerated half of colonoscopy prep yesterday and half this morning, initially had bright red blood with prep which cleared as as the prep went on, now clear.  No abdominal pain, no nausea or vomiting.  Has been n.p.o.    ROS:  Gastrointestinal ROS: no abdominal pain, change in bowel habits, or black or bloody stools    Physical    VITALS:  BP (!) 142/73   Pulse 73   Temp 97.7 °F (36.5 °C) (Oral)   Resp 18   Ht 1.702 m (5' 7\")   Wt (!) 142.6 kg (314 lb 6.4 oz)   SpO2 99%   BMI 49.24 kg/m²   TEMPERATURE:  Current - Temp: 97.7 °F (36.5 °C); Max - Temp  Av.8 °F (36.6 °C)  Min: 97.3 °F (36.3 °C)  Max: 98.1 °F (36.7 °C)    General:  Alert and oriented,  No apparent distress  Skin- without jaundice  Eyes: anicteric sclera  Cardiac: RRR, Nl s1s2, without murmurs  Lungs CTA Bilaterally, normal effort  Abdomen soft, ND, NT, no HSM, Bowel sounds normal  Ext: without edema  Neuro: no asterixis     Data    Data Review:    Recent Labs     25  0610 25  0426 25  0435   WBC 6.5 10.7 9.5   HGB 9.1* 7.7* 7.5*   HCT 31.1* 24.5* 23.5*   MCV 88.6 85.1 85.1    238 236     Recent Labs     25  0610 25  1136 25  1834 25  0426 25  0435     --   --  142 141   K 6.0*   < > 5.6* 5.3* 5.0*   *  --   --  111* 109*   CO2 19*  --   --  21 22   BUN 84*  --   --  61* 56*   CREATININE 2.28*  --   --  1.74* 1.32*    < > = values in this interval not displayed.     Recent Labs     25  0610 25  0426 25  0435   AST 14 10 17   ALT 11 9 15   BILITOT 0.3 <0.2 <0.2   ALKPHOS 65 49 47     No results for input(s): \"LIPASE\", \"AMYLASE\" in the last 72 hours.  Recent Labs     254   PROTIME 18.9*   INR 1.5           ASSESSMENT:  63 y/o female admitted for headache and ruptured blood vessels in the 
Gastroenterology Progress Note    Cande Richey is a 64 y.o. female patient.  Hospitalization Day:3    Chief C/O: Hematochezia    SUBJECTIVE: Patient had a colonoscopy completed 2/28/2025 noting diverticulosis in the transverse, sigmoid and ascending colon.  No evidence of diverticular bleed, internal and external hemorrhoids.  Patient's hemoglobin today 7.7.  Patient is on full liquid diet at this time. Patient reports having history of internal hemorrhoids since last colonoscopy in 2022. She reports having bowel movements every 2 to 3 days. She does endorse having some straining with BM and intermittent bright red blood on an intermittent basis. She denies nausea, vomting nor abdominal pain.  Patient did not have bowel movement today.     Previous endoscopic evaluations:  Colonoscopy 2/28/2025, Tanesha Serrano  Diverticulosis in the transverse colon, in the sigmoid colon and in the ascending colon.  There was no evidence of diverticular bleeding. Pandiverticulosis predominantly on the sigmoid colon.  Severe diverticulosis in sigmoid colon  External external and internal non-bleeding hemorrhoids. No old or fresh blood seen throughout Limited evaluation of the cecum owing to poor prep otherwise fair prep    EGD and colonoscopy, 9/2022, Dr. Anish Cuadra, [facility]  Distal esophageal stricture which was biopsied and 51-Welsh savory dilation performed, moderate size hiatal hernia, moderate gastritis worse in the gastric antrum.  Random gastric biopsies, random duodenal biopsies internal Hemorrhoids with evidence of rectal bleeding.  2 mm rectal polyp, diverticulosis, otherwise normal exam   Histology: duodenum biopsy no significant histological abnormality   Gastric biopsy-mild chronic inactive gastritis negative for Helicobacter pylori  Esophageal biopsy negative for metaplasia or fungal organism   Rectal polyp hyperplastic    ROS:  Gastrointestinal ROS: no abdominal pain, change 
Nephrology Progress Note    Assessment:  CKD 3a  Anemia  diverticulosis no bleeding  Obese-JACOB  Hypothyroid  Hyperlipidemia        Plan: stable vitals  and noted Hgb low  otherwise quite stable   lose weight     Patient Active Problem List:     Weakness     Difficulty in walking     Primary hypertension     Paroxysmal atrial fibrillation (HCC)     Chronic gout of multiple sites     Acute gout of multiple sites     Gastroesophageal reflux disease without esophagitis     Heart failure, unspecified (HCC)     Hyperlipidemia, unspecified     Hypothyroidism, unspecified     Other cervical disc degeneration, unspecified cervical region     Other intervertebral disc degeneration, thoracolumbar region     Spondylolisthesis, lumbar region     Gout, unspecified     Effusion, right knee     Anemia, unspecified     Rash     Urge incontinence     Stage 3b chronic kidney disease (HCC)     Headache     Conjunctival hemorrhage of left eye     GIB (gastrointestinal bleeding)     Blood per rectum     Hematochezia     Other hemorrhoids      Subjective:  Admit Date: 2/25/2025    Interval History: stable    Medications:  Scheduled Meds:   hydrocortisone  25 mg Rectal Nightly    [Held by provider] apixaban  5 mg Oral BID    melatonin  3 mg Oral Nightly    predniSONE  60 mg Oral Daily    atorvastatin  40 mg Oral Nightly    trospium  20 mg Oral BID AC    flecainide  25 mg Oral BID    folic acid  1,000 mcg Oral Daily    levothyroxine  200 mcg Oral Daily    metoprolol tartrate  50 mg Oral BID    pantoprazole  40 mg Oral QAM AC    tetracaine  1 drop Left Eye Once     Continuous Infusions:   sodium chloride      dextrose         CBC:   Recent Labs     03/01/25  0458 03/02/25  0531   WBC 5.6 7.3   HGB 7.7* 7.7*    244     CMP:    Recent Labs     02/28/25  0435 03/01/25  0458 03/02/25  0531    143 139   K 5.0* 4.7 4.3  4.3   * 112* 108*   CO2 22 20 21   BUN 56* 41* 31*   CREATININE 1.32* 1.15* 1.21*   GLUCOSE 125* 120* 87 
Nephrology Progress Note    Assessment:  THERESE improving daily non oliguric  Anemia  PAF  Hypertension  Obese  Hypothyroid  OHDx HFpEF          Plan:follow labs-- off IV fluids  check Iron    Patient Active Problem List:     Weakness     Difficulty in walking     Primary hypertension     Paroxysmal atrial fibrillation (HCC)     Chronic gout of multiple sites     Acute gout of multiple sites     Gastroesophageal reflux disease without esophagitis     Heart failure, unspecified (HCC)     Hyperlipidemia, unspecified     Hypothyroidism, unspecified     Other cervical disc degeneration, unspecified cervical region     Other intervertebral disc degeneration, thoracolumbar region     Spondylolisthesis, lumbar region     Gout, unspecified     Effusion, right knee     Anemia, unspecified     Rash     Urge incontinence     Stage 3b chronic kidney disease (HCC)     Headache     Conjunctival hemorrhage of left eye     GIB (gastrointestinal bleeding)     Blood per rectum      Subjective:  Admit Date: 2/25/2025    Interval History: stable    Medications:  Scheduled Meds:   hydrocortisone  25 mg Rectal Nightly    [Held by provider] apixaban  5 mg Oral BID    melatonin  3 mg Oral Nightly    predniSONE  60 mg Oral Daily    atorvastatin  40 mg Oral Nightly    trospium  20 mg Oral BID AC    flecainide  25 mg Oral BID    folic acid  1,000 mcg Oral Daily    levothyroxine  200 mcg Oral Daily    metoprolol tartrate  50 mg Oral BID    pantoprazole  40 mg Oral QAM AC    tetracaine  1 drop Left Eye Once     Continuous Infusions:   sodium chloride      dextrose         CBC:   Recent Labs     02/28/25  0435 03/01/25  0458   WBC 9.5 5.6   HGB 7.5* 7.7*    235     CMP:    Recent Labs     02/27/25  0426 02/28/25  0435 03/01/25  0458    141 143   K 5.3* 5.0* 4.7   * 109* 112*   CO2 21 22 20   BUN 61* 56* 41*   CREATININE 1.74* 1.32* 1.15*   GLUCOSE 156* 125* 120*   CALCIUM 9.8 9.5 9.5   LABGLOM 32.5* 45.2* 53.3*     Troponin: No 
Physical Therapy  Facility/Department: [facility] MED SURG W264/W264-01  Physical Therapy Discharge      NAME: Cande Richey    : 1960 (64 y.o.)  MRN: [id number]    Account: [id number]  Gender: female      Patient has been discharged from acute care hospital. DC patient from current PT program.      Electronically signed by Carmen Manriquez PT on 3/3/25 at 12:38 PM EST    
Physical Therapy Med Surg Daily Treatment Note  Facility/Department: [facility] [place], [place] 2W ORTHO TELE  Room: [place] [id number]       NAME: Cande Richey  : 1960 (64 y.o.)  MRN: [id number]  CODE STATUS: Full Code    Date of Service: 2025    Patient Diagnosis(es): Hyperkalemia [E87.5]  Conjunctival hemorrhage of left eye [H11.32]  Headache [R51.9]  Stage 3 chronic kidney disease, unspecified whether stage 3a or 3b CKD (HCC) [N18.30]   Chief Complaint   Patient presents with    Headache     Pt c/o a headache, nausea, and dizziness that started approximately 2 hours PTA     Patient Active Problem List    Diagnosis Date Noted    Headache 2025    Conjunctival hemorrhage of left eye 2025    Stage 3b chronic kidney disease (HCC) 2024    Urge incontinence 2024    Rash 10/25/2023    Gastroesophageal reflux disease without esophagitis 10/19/2023    Gout, unspecified 09/15/2023    Chronic gout of multiple sites 2023    Acute gout of multiple sites 2023    Heart failure, unspecified (HCC) 2023    Difficulty in walking 2023    Primary hypertension 2023    Paroxysmal atrial fibrillation (HCC) 2023    Hyperlipidemia, unspecified 2023    Hypothyroidism, unspecified 2023    Other cervical disc degeneration, unspecified cervical region 2023    Other intervertebral disc degeneration, thoracolumbar region 2023    Spondylolisthesis, lumbar region 2023    Effusion, right knee 2023    Anemia, unspecified 2023    Weakness 2023        Past Medical History:   Diagnosis Date    Atrial fibrillation (HCC)     Hyperlipidemia     Hypertension     Thyroid disease      Past Surgical History:   Procedure Laterality Date    JOINT REPLACEMENT      STIMULATOR SURGERY N/A 2024    MEDTRONIC STAGE 1 AND STAGE 2 INTERSTIM performed by Sabine Bradford MD at [facility] [place] [place] OR    THYROIDECTOMY         Restrictions  Restrictions/Precautions: Fall Risk    SUBJECTIVE 
Physical Therapy Missed Treatment   Facility/Department: [facility] MED SURG [id number]/[id number]    NAME: Cande Richey    : 1960 (64 y.o.)  MRN: [id number]    Account: [id number]  Gender: female    Chart reviewed, attempted PT at 13:44. Patient unavailable 2° to:    [x] Pt declined therapy attempt d/t just returning from a colonoscopy and is still \"leaking\" according to pt and she doesn't feel comfortable moving at this time.       Will attempt PT treatment again at earliest convenience.      Electronically signed by BRITTANY PERDOMO PTA on 25 at 1:53 PM EST    
108*  --   --  111* 109*   CO2 19*  --   --  21 22   BUN 84*  --   --  61* 56*   CREATININE 2.28*  --   --  1.74* 1.32*   GLUCOSE 107*  --   --  156* 125*   CALCIUM 10.6*  --   --  9.8 9.5   LABGLOM 23.5*  --   --  32.5* 45.2*    < > = values in this interval not displayed.     Troponin: No results for input(s): \"TROPONINI\" in the last 72 hours.  BNP: No results for input(s): \"BNP\" in the last 72 hours.  INR:   Recent Labs     02/25/25 2124   INR 1.5     Lipids: No results for input(s): \"CHOL\", \"LDLDIRECT\", \"TRIG\", \"HDL\", \"AMYLASE\", \"LIPASE\" in the last 72 hours.  Liver:   Recent Labs     02/28/25  0435   AST 17   ALT 15   ALKPHOS 47   BILITOT <0.2     Iron:  No results for input(s): \"FERRITIN\" in the last 72 hours.    Invalid input(s): \"IRONS\", \"LABIRONS\"  Urinalysis: No results for input(s): \"UA\" in the last 72 hours.    Objective:  Vitals: BP (!) 142/69   Pulse 73   Temp 97.7 °F (36.5 °C) (Oral)   Resp 18   Ht 1.702 m (5' 7\")   Wt (!) 142.6 kg (314 lb 6.4 oz)   SpO2 99%   BMI 49.24 kg/m²    Wt Readings from Last 3 Encounters:   02/25/25 (!) 142.6 kg (314 lb 6.4 oz)   11/15/24 (!) 140.6 kg (310 lb)   10/24/24 (!) 142 kg (313 lb)      24HR INTAKE/OUTPUT:    Intake/Output Summary (Last 24 hours) at 2/28/2025 0728  Last data filed at 2/27/2025 2002  Gross per 24 hour   Intake 700 ml   Output 500 ml   Net 200 ml       General: alert, in no apparent distress  HEENT: normocephalic, atraumatic, anicteric  Neck: supple, no mass  Lungs: non-labored respirations, clear to auscultation bilaterally  Heart: regular rate and rhythm, no murmurs or rubs  Abdomen: soft, non-tender, non-distended  Ext: no cyanosis, no peripheral edema  Neuro: alert and oriented, no gross abnormalities  Psych: normal mood and affect  Skin: no rash      Electronically signed by Matthew Alan DO              
reactive to light, extraocular eye movements intact, conjunctivae normal  Neck: neck supple and non tender without mass   Pulmonary/Chest: clear to auscultation bilaterally- no wheezes, rales or rhonchi, normal air movement, no respiratory distress  Cardiovascular: normal rate, normal S1 and S2 and no carotid bruits  Abdomen: soft, non-tender, non-distended, normal bowel sounds, no masses or organomegaly  Extremities: no cyanosis, no clubbing and no edema  Neurologic: no cranial nerve deficit and speech normal        Recent Labs     02/27/25 0426 02/28/25 0435 03/01/25  0458    141 143   K 5.3* 5.0* 4.7   * 109* 112*   CO2 21 22 20   BUN 61* 56* 41*   CREATININE 1.74* 1.32* 1.15*   GLUCOSE 156* 125* 120*   CALCIUM 9.8 9.5 9.5       Recent Labs     02/27/25 0426 02/28/25 0435 03/01/25  0458   WBC 10.7 9.5 5.6   RBC 2.88* 2.76* 2.88*   HGB 7.7* 7.5* 7.7*   HCT 24.5* 23.5* 24.3*   MCV 85.1 85.1 84.4   MCH 26.7* 27.2 26.7*   MCHC 31.4* 31.9* 31.7*   RDW 16.3* 16.2* 16.2*    236 235           Assessment:    Headache  Frontal/temporal left-sided headache  Conjunctival hemorrhage in L eye. Eliquis on hold for now, resume when appropriate.   Denies vision loss / change  Significant tenderness to palpation left temporal region  ESR: 68  CRP: <3  CT head: No acute intracranial abnormality noted.   Suspect temporal arteritis vs cervicogenic headache. Discussed with neurologist who favors the latter as we would typically expect higher CRP in temporal arteritis   Admit to [facility] with continuous telemetry  Neurology following   60 mg prednisone p.o. daily for short course  Every shift neurochecks  Initial plan was to transfer to [facility] for arterial biopsy but this can be cancelled as patient is clinically improved and neurologist is suspecting this to be cervicogenic. No vision changes with her associated conjunctival hemorrhage, will place outpatient optho referral.     THERESE on CKD- improving   BUN 
22 20   BUN 61* 56* 41*   CREATININE 1.74* 1.32* 1.15*   CALCIUM 9.8 9.5 9.5     Recent Labs     02/27/25  0426 02/28/25  0435 03/01/25  0458   AST 10 17 11   ALT 9 15 15   BILITOT <0.2 <0.2 <0.2   ALKPHOS 49 47 50     No results for input(s): \"INR\" in the last 72 hours.    No results for input(s): \"CKTOTAL\", \"TROPONINI\" in the last 72 hours.    Urinalysis:   Lab Results   Component Value Date/Time    NITRU Negative 02/26/2025 12:52 AM    WBCUA 3-5 02/26/2025 12:52 AM    BACTERIA FEW 02/26/2025 12:52 AM    RBCUA 0-2 02/26/2025 12:52 AM    BLOODU Negative 02/26/2025 12:52 AM    SPECGRAV 1.030 03/19/2024 11:59 AM    GLUCOSEU Negative 02/26/2025 12:52 AM       Radiology:   Most recent    EEG No valid procedures specified.    MRI of Brain No results found for this or any previous visit.  No results found for this or any previous visit.                            MRA of the Head and Neck: No results found for this or any previous visit.  No results found for this or any previous visit.  No results found for this or any previous visit.                            CT of the Head: Results for orders placed during the hospital encounter of 02/25/25    CT HEAD WO CONTRAST    Narrative  EXAM:    CT Head Without Intravenous Contrast    EXAM DATE/TIME:    2/25/2025 7:33 pm    CLINICAL HISTORY:    ORDERING SYSTEM PROVIDED HISTORY: headache  TECHNOLOGIST PROVIDED HISTORY:  Reason for exam:->headache  Has a \"code stroke\" or \"stroke alert\" been  called?->No  Decision Support Exception - unselect if not a suspected or  confirmed emergency medical condition->Emergency Medical Condition (MA)  What  reading provider will be dictating this exam?->CRC    TECHNIQUE:    Axial computed tomography images of the head/brain without intravenous  contrast.  This CT exam was performed using one or more of the following dose  reduction techniques:  automated exposure control, adjustment of the mA  and/or kV according to patient size, and/or use of 
admission, baseline is 1.58  GFR 17.2, baseline is 36.5  Patient denies decreased oral intake. I have concerns that some of this could be diuretic induced as she is on febuxostat, lasix, Aldactone  Normal saline at 100mL/h  Monitor and trend CMP  Avoid nephrotoxic agents  Encourage oral hydration  I/Os  Home Flecainide dose dropped 50% currently to be renally dosed  Consult nephrology   DC tomorrow if Cr and hyperkalemia are improved     3.  Hyperkalemia and hypermagnesemia  K+ 5.8 on admission. Downtrending after lokelma but still mildly elevated  Mg 3.0  Reports taking magnesium supplements at home  No acute cardiac arrhythmias  Continuous telemetry  Trend daily     EKG: Normal sinus rhythm  Code Status: Full  DVT/PE prophylaxis: SCDs           Electronically signed by Angelito Roque MD on 2/27/2025 at 2:37 PM      
is 1.58  GFR 17.2, baseline is 36.5  Patient denies decreased oral intake. I have concerns that some of this could be diuretic induced as she is on febuxostat, lasix, Aldactone  Normal saline at 100mL/h  Monitor and trend CMP  Avoid nephrotoxic agents  Encourage oral hydration  I/Os  Home Flecainide dose dropped 50% currently to be renally dosed  nephrology following        3.  Hyperkalemia and hypermagnesemia  K+ 5.8 on admission. Downtrending after lokelma but still mildly elevated  Mg 3.0  Reports taking magnesium supplements at home  No acute cardiac arrhythmias  Continuous telemetry  Trend daily     EKG: Normal sinus rhythm  Code Status: Full  DVT/PE prophylaxis: SCDs       Colonoscopy done today   GI input appreciated  Serial H/H         Electronically signed by Vera Mcgill MD on 2/28/2025 at 2:48 PM      
Neuromuscular re-education, Home exercise program, Safety education & training, Patient/Caregiver education & training, Equipment evaluation, education, & procurement, Positioning, Therapeutic activities    Safety Devices  Type of Devices: All fall risk precautions in place, Call light within reach, Left in chair    Goals:  Long Term Goals  Long Term Goal 1: Pt will demonstrate bed mobility indep  Long Term Goal 2: Pt will demonstrate transfers mod indep with safest AD  Long Term Goal 3: Pt will demonstrate amb >/= 50ft mod indep with safest AD  Long Term Goal 4: Pt will demonstrate TUG </= 20 sec for decreased risk for falls    AMPAC (6 CLICK) BASIC MOBILITY  AM-PAC Inpatient Mobility Raw Score : 16     Therapy Time:   Individual   Time In 0915   Time Out 0927   Minutes 12       Eval X 12 min    Carmen Manriquez PT, 02/26/25 at 11:38 AM         Definitions for assistance levels  Independent = pt does not require any physical supervision or assistance from another person for activity completion. Device may be needed.  Stand by assistance = pt requires verbal cues or instructions from another person, close to but not touching, to perform the activity  Minimal assistance= pt performs 75% or more of the activity; assistance is required to complete the activity  Moderate assistance= pt performs 50% of the activity; assistance is required to complete the activity  Maximal assistance = pt performs 25% of the activity; assistance is required to complete the activity  Dependent = pt requires total physical assistance to accomplish the task  
Recommendations:  OT D/C RECOMMENDATIONS  REQUIRES OT FOLLOW-UP: Yes    Equipment Recommendations:  OT Equipment Recommendations  Other: Continue to assess    OT Education:   Patient Education  Education Given To: Patient  Education Provided: Role of Therapy;Plan of Care  Education Method: Verbal  Barriers to Learning: None  Education Outcome: Verbalized understanding    OT Follow Up:    OT D/C RECOMMENDATIONS  REQUIRES OT FOLLOW-UP: Yes       Assessment/Discharge Disposition:  Assessment: Pt is a 64 year old woman from home who [facility] to [facility] with headache. She demonstrates decreased balance and endurance, limited d/t weakness, fatigue and decreased coordination. Pt would benefit from continued OT to address weakness and fatigue. Pt would benefit from continued OT to maximize independence and safety with ADL tasks.  Performance deficits / Impairments: Decreased functional mobility , Decreased ADL status, Decreased balance, Decreased strength, Decreased endurance, Decreased high-level IADLs, Decreased vision/visual deficit  Prognosis: Good  Discharge Recommendations: Continue to assess pending progress  Decision Making: Medium Complexity     History: Pt's medical history is moderately complex  Exam: Pt has 7 performance deficits  Assistance / Modification: Pt requires min A    AMPAC (Six Click) Self care Score    How much help is needed for putting on and taking off regular lower body clothing?: A Lot  How much help is needed for bathing (which includes washing, rinsing, drying)?: A Little  How much help is needed for toileting (which includes using toilet, bedpan, or urinal)?: A Little  How much help is needed for putting on and taking off regular upper body clothing?: A Little  How much help is needed for taking care of personal grooming?: None  How much help for eating meals?: None  AM-PAC Inpatient Daily Activity Raw Score: 19  AM-PAC Inpatient ADL T-Scale Score : 40.22  ADL Inpatient CMS 0-100% Score: 
potassium of 5.8, BUN of 91, creatinine of 2.96, calcium 10.4, hemoglobin 9.3, sed rate 68, CRP less than 3.     Patient with ongoing left frontal temporal headache.  Tenderness over the temporal region.  Tenderness over the left sternocleidomastoid.  Concern for temporal arteritis.  Although his CRP normal this can occur in some instances with temporal arteritis.  Currently on prednisone 60 mg daily.  Consideration also given to systemic headache versus cervicogenic/tension.  Will need outpatient follow-up with ophthalmology.     I have personally performed a face to face diagnostic evaluation on this patient, reviewed all data and investigations, and am the sole provider of all clinical decisions on the neurological status of this patient.  Patient seen and examined events as noted above.  Given the normal CRP and high sensitivity CRP unlikely to be temporal arteritis.  In fact her pain is at the trapezius level which is reflecting onto the temporal area.  Will keep her on the prednisone if this is a neck pain for a few days.  The sensitivity of diagnosis of temporal arteritis with abnormal sed rate and abnormal CRP is 99% though the specificity of normal CRP with abnormal ESR is only 72%.  Clinically also this is very difficult to certain as patient has conjunctival hemorrhage which may cause pain as well 60% time spent on evaluating patient for     2/27/2025:  Headache, improved  Currently on prednisone 60 mg daily for possible temporal arteritis  Patient with multiple other underlying medical issues including anemia, THERESE, hyperkalemia.  History of A-fib, Eliquis on hold secondary to anemia.  Will monitor neurostatus closely.    I have personally performed a face to face diagnostic evaluation on this patient, reviewed all data and investigations, and am the sole provider of all clinical decisions on the neurological status of this patient.  Headache improved with a single dose of medication and therefore

## 2025-03-04 ENCOUNTER — TELEPHONE (OUTPATIENT)
Dept: PHARMACY | Facility: CLINIC | Age: 65
End: 2025-03-04

## 2025-03-04 DIAGNOSIS — M10.9 ACUTE GOUT OF MULTIPLE SITES, UNSPECIFIED CAUSE: ICD-10-CM

## 2025-03-04 DIAGNOSIS — R73.9 HYPERGLYCEMIA: ICD-10-CM

## 2025-03-04 DIAGNOSIS — E78.2 MIXED HYPERLIPIDEMIA: ICD-10-CM

## 2025-03-04 DIAGNOSIS — I10 PRIMARY HYPERTENSION: Primary | ICD-10-CM

## 2025-03-04 DIAGNOSIS — E03.9 HYPOTHYROIDISM, UNSPECIFIED TYPE: ICD-10-CM

## 2025-03-04 NOTE — TELEPHONE ENCOUNTER
CLINICAL PHARMACY NOTE - [facility] Pharmacy Referral    Patient can be scheduled with:  Team Schedule- General Referrals, etc.    Received a referral from: Provider: Willy LARKIN  to discuss patient’s medications. Called patient to schedule a time to speak with a pharmacist over the telephone.   Spoke to patient and advised them of the above message.  Patient verified understanding and scheduled their appointment: tomorrow at 10AM      Lisa Jacob CPhT.   [facility] Clinical   [facility] Clinical Pharmacy  Toll free: [phone] Option 1    For Pharmacy Admin Tracking Only    Program: [facility] [employer]  CPA in place:  No  Recommendation Provided To: Patient/Caregiver: 1 via Telephone  Intervention Detail: Scheduled Appointment  Intervention Accepted By: Patient/Caregiver: 1  Gap Closed?: Yes   Time Spent (min): 5

## 2025-03-05 ENCOUNTER — TELEPHONE (OUTPATIENT)
Dept: PHARMACY | Facility: CLINIC | Age: 65
End: 2025-03-05

## 2025-03-05 DIAGNOSIS — H11.32 CONJUNCTIVAL HEMORRHAGE OF LEFT EYE: Primary | ICD-10-CM

## 2025-03-05 NOTE — TELEPHONE ENCOUNTER
Kirsten Aguilera, APRN - CNP  Medication review completed with patient.  Reviewed all of the changes in detail.    Educated on interaction between iron and levothyroxine. She is spacing appropriately, but if she needs to be on iron long term it would be good to check her TSH in a couple months to determine if levothyroxine dose needs adjusted  Advised her to reach out and followup with cardio asap instead of waiting until her appt in June  Pt was concerned about the need for a taper off the prednisone.  Taper is not generally necessary if therapy is <2weeks, but a taper could be considered. She will be at day 10 when she has her visit with you  Just an fyi, she may soon not be able to get the compounded Tirzepatide because it will not be classified as a medication in shortage by the FDA.  This will make the compounding of it illegal.  She is aware    Patient has appointment with you 3/12/25. Please discuss with patient at appointment or route back to me to discuss with patient.    See note below for complete details.     Thank you,  WILLIAMS Lindsay, PharmD, [facility]  Ambulatory Care Clinical Pharmacist- [facility] Clinical Pharmacy  Department, toll free: [phone]    =======================================================  [facility] CLINICAL PHARMACY REVIEW: Post-Discharge Transitions of Care (SERENA)  Subjective/Objective:  Cande Richey is a 64 y.o. female. Patient was discharged from [facility] on 3/2/25 with a diagnosis of Conjunctival hemorrhage of left eye, Blood per rectum, Hematochezia, Other hemorrhoids.    Patient outreach to review discharge medications and provide medication review and management.    Spoke with patient.    Allergies   Allergen Reactions    Erythromycin Other (See Comments)     States everything slows down    Allopurinol Itching and Rash       Discharge Medications (as per discharging medication list found in AVS):  There are NEW medications for

## 2025-03-07 ENCOUNTER — OFFICE VISIT (OUTPATIENT)
Dept: CARDIOLOGY CLINIC | Age: 65
End: 2025-03-07
Payer: COMMERCIAL

## 2025-03-07 VITALS
HEART RATE: 65 BPM | SYSTOLIC BLOOD PRESSURE: 138 MMHG | BODY MASS INDEX: 49.46 KG/M2 | OXYGEN SATURATION: 97 % | DIASTOLIC BLOOD PRESSURE: 82 MMHG | WEIGHT: 293 LBS

## 2025-03-07 DIAGNOSIS — I48.0 PAROXYSMAL ATRIAL FIBRILLATION (HCC): ICD-10-CM

## 2025-03-07 DIAGNOSIS — E78.2 MIXED HYPERLIPIDEMIA: ICD-10-CM

## 2025-03-07 DIAGNOSIS — I10 PRIMARY HYPERTENSION: Primary | ICD-10-CM

## 2025-03-07 PROCEDURE — 3017F COLORECTAL CA SCREEN DOC REV: CPT | Performed by: INTERNAL MEDICINE

## 2025-03-07 PROCEDURE — 93000 ELECTROCARDIOGRAM COMPLETE: CPT | Performed by: INTERNAL MEDICINE

## 2025-03-07 PROCEDURE — 99214 OFFICE O/P EST MOD 30 MIN: CPT | Performed by: INTERNAL MEDICINE

## 2025-03-07 PROCEDURE — G8417 CALC BMI ABV UP PARAM F/U: HCPCS | Performed by: INTERNAL MEDICINE

## 2025-03-07 PROCEDURE — 1036F TOBACCO NON-USER: CPT | Performed by: INTERNAL MEDICINE

## 2025-03-07 PROCEDURE — 3075F SYST BP GE 130 - 139MM HG: CPT | Performed by: INTERNAL MEDICINE

## 2025-03-07 PROCEDURE — G8427 DOCREV CUR MEDS BY ELIG CLIN: HCPCS | Performed by: INTERNAL MEDICINE

## 2025-03-07 PROCEDURE — 3079F DIAST BP 80-89 MM HG: CPT | Performed by: INTERNAL MEDICINE

## 2025-03-07 PROCEDURE — 1111F DSCHRG MED/CURRENT MED MERGE: CPT | Performed by: INTERNAL MEDICINE

## 2025-03-07 RX ORDER — AMLODIPINE BESYLATE 5 MG/1
5 TABLET ORAL DAILY
Qty: 30 TABLET | Refills: 3 | Status: SHIPPED | OUTPATIENT
Start: 2025-03-07

## 2025-03-07 RX ORDER — METOPROLOL TARTRATE 100 MG/1
100 TABLET ORAL 2 TIMES DAILY
Qty: 180 TABLET | Refills: 3 | Status: SHIPPED | OUTPATIENT
Start: 2025-03-07

## 2025-03-07 NOTE — PATIENT INSTRUCTIONS
Increase metoprolol to 100 mg twice a day. You can take two tablets of 50 mg twice a day to use up your current supply. New prescription for 100 mg tablets will be sent to your pharmacy when you run out of current dose tablets.   Start amlodipine 5 mg daily  Continue BP logs  OK to hold Eliquis for now. Resume as soon as hemorrhoidal bleeding improves. If recurrent bleeding if needed.

## 2025-03-07 NOTE — PROGRESS NOTES
3/7/2025    Kirsten Aguilera, APRN - CNP  [address] [place] [address]    RE: Cande Richey   : 1960     Dear Kirsten Aguilera, TRAE - CNP :    I had the pleasure of seeing your patient, Cande Richey in the office today on 3/7/2025.    As you are well aware, Ms. Richey is a pleasant 64 y.o.  female who was kindly referred to me for evaluation of dyspnea atrial fibrillation.  She was diagnosed with atrial fibrillation 4 years ago and was previously following with a cardiologist in [place], last seen May 2023.  She had a cardiac catheterization 2021 and showed me results revealing normal coronary arteries with EF 55%.  She was recently hospitalized at [facility] in August.  Currently, she reports intermittent palpitations from atrial fibrillation described as \"pounding\" heartbeats that occur 2-3 times a week.  Most episodes last a few minutes before gradually resolving.  She denies associated near-syncope or syncope.  She has occasional right-sided chest discomfort which radiates to the back.  No midsternal left-sided chest discomfort with activity.  She reports compliance with medications including Eliquis metoprolol, and flecainide.      23: Patient here for follow-up of paroxysmal atrial fibrillation and recent event monitor results.  Currently, she reports feeling better.  She reports less palpitations.  No chest pain.  No worsening exertional dyspnea.  She reports compliance with medications including Eliquis.    24: Patient here for follow-up of paroxysmal atrial fibrillation on flecainide, metoprolol, and Eliquis.  Currently, she reports feeling reasonably well.  She denies any significant palpitations on rate/rhythm control therapy.  No near-syncope or syncope.  She still has dyspnea with more physical degrees of activity but this is chronic and unchanged.  She denies any anginal chest pain.  She reports compliance with medications.  She saw sleep medicine

## 2025-03-12 ENCOUNTER — OFFICE VISIT (OUTPATIENT)
Age: 65
End: 2025-03-12

## 2025-03-12 VITALS
DIASTOLIC BLOOD PRESSURE: 74 MMHG | SYSTOLIC BLOOD PRESSURE: 146 MMHG | TEMPERATURE: 97.8 F | BODY MASS INDEX: 45.99 KG/M2 | OXYGEN SATURATION: 97 % | HEIGHT: 67 IN | HEART RATE: 67 BPM | WEIGHT: 293 LBS

## 2025-03-12 DIAGNOSIS — G43.009 MIGRAINE WITHOUT AURA AND WITHOUT STATUS MIGRAINOSUS, NOT INTRACTABLE: ICD-10-CM

## 2025-03-12 DIAGNOSIS — Z12.31 ENCOUNTER FOR SCREENING MAMMOGRAM FOR MALIGNANT NEOPLASM OF BREAST: ICD-10-CM

## 2025-03-12 DIAGNOSIS — I10 PRIMARY HYPERTENSION: Primary | ICD-10-CM

## 2025-03-12 RX ORDER — PREDNISONE 10 MG/1
TABLET ORAL
Qty: 14 TABLET | Refills: 0 | Status: SHIPPED | OUTPATIENT
Start: 2025-03-12 | End: 2025-03-18

## 2025-03-12 RX ORDER — AMLODIPINE BESYLATE 10 MG/1
10 TABLET ORAL DAILY
Qty: 30 TABLET | Refills: 2 | Status: SHIPPED | OUTPATIENT
Start: 2025-03-12

## 2025-03-12 NOTE — PROGRESS NOTES
to help lower her blood pressure and alleviate headaches. She will continue her follow-up with her cardiologist as scheduled on 04/07/2025. An updated cholesterol panel may be obtained during her next cardiology appointment. If her blood pressure remains uncontrolled, a consultation with a nephrologist may be considered.    2. Anemia.  She was started on iron supplementation due to anemia identified during her hospital stay. No additional blood work is deemed necessary at this time.    3. Hemorrhoids.  She has been on prednisone 60 mg for 10 days to manage headaches and hemorrhoid bleeding. A tapering schedule for prednisone has been provided: 40 mg for 2 days, 20 mg for 2 days, and 10 mg for 2 days. This will be sent to [facility].    4. Health maintenance.  She is due for a mammogram this month. She will be contacted to schedule this.    Follow-up  The patient is scheduled for a follow-up visit in 6 months, or earlier if necessary.    Orders Placed This Encounter   Procedures    DEEPA EL DIGITAL SCREEN BILATERAL     Standing Status:   Future     Expected Date:   3/12/2025     Expiration Date:   5/12/2026     Reason for exam::   screening     Orders Placed This Encounter   Medications    amLODIPine (NORVASC) 10 MG tablet     Sig: Take 1 tablet by mouth daily     Dispense:  30 tablet     Refill:  2    predniSONE (DELTASONE) 10 MG tablet     Sig: Take 4 tablets by mouth daily for 2 days, THEN 2 tablets daily for 2 days, THEN 1 tablet daily for 2 days.     Dispense:  14 tablet     Refill:  0     Medications Discontinued During This Encounter   Medication Reason    amLODIPine (NORVASC) 5 MG tablet        On this date 3/12/2025 I have spent 30 minutes reviewing previous notes, test results and face to face with the patient discussing the diagnosis and importance of compliance with the treatment plan as well as documenting on the day of the visit.    Return in about 6 months (around 9/12/2025).       Reviewed

## 2025-03-14 ENCOUNTER — TELEPHONE (OUTPATIENT)
Age: 65
End: 2025-03-14

## 2025-03-14 NOTE — TELEPHONE ENCOUNTER
[place] in home healthcare stating pt has had problems sleeping for awhile but seemed to have gotten worse after she started tapering off prednisone. She is wondering if it could be from that and if it would be safe foen her to take melatonin 12 mg since she already has them in her home.    Ph. [phone] or call back patient

## 2025-03-25 DIAGNOSIS — N18.31 STAGE 3A CHRONIC KIDNEY DISEASE (HCC): Primary | ICD-10-CM

## 2025-04-02 DIAGNOSIS — M10.9 ACUTE GOUT OF MULTIPLE SITES, UNSPECIFIED CAUSE: ICD-10-CM

## 2025-04-02 DIAGNOSIS — I10 PRIMARY HYPERTENSION: ICD-10-CM

## 2025-04-02 NOTE — TELEPHONE ENCOUNTER
Last Office Visit:   3/12/2025    Next Visit Date:  Future Appointments   Date Time Provider Department Center   4/7/2025  6:00 PM [facility]   4/17/2025 12:00 PM Beverley Adam DO [facility]   9/16/2025 11:00 AM Kirsten Aguilera, APRN - CNP [facility] E[facility] DEP

## 2025-04-02 NOTE — TELEPHONE ENCOUNTER
Last Office Visit:   3/12/2025    Next Visit Date:  Future Appointments   Date Time Provider Department Center   4/7/2025  6:00 PM [facility]   4/17/2025 12:00 PM Beverley Adam DO [facility]   9/16/2025 11:00 AM Kirsten Aguilera, APRN - CNP [facility] ECC DEP

## 2025-04-03 RX ORDER — FUROSEMIDE 20 MG/1
20 TABLET ORAL DAILY
Qty: 90 TABLET | Refills: 1 | Status: SHIPPED | OUTPATIENT
Start: 2025-04-03

## 2025-04-03 RX ORDER — ATORVASTATIN CALCIUM 40 MG/1
40 TABLET, FILM COATED ORAL NIGHTLY
Qty: 30 TABLET | Refills: 3 | Status: SHIPPED | OUTPATIENT
Start: 2025-04-03

## 2025-04-03 RX ORDER — AMLODIPINE BESYLATE 10 MG/1
10 TABLET ORAL DAILY
Qty: 30 TABLET | Refills: 2 | Status: SHIPPED | OUTPATIENT
Start: 2025-04-03

## 2025-04-03 RX ORDER — FEBUXOSTAT 80 MG/1
80 TABLET, FILM COATED ORAL DAILY
Qty: 30 TABLET | Refills: 2 | Status: SHIPPED | OUTPATIENT
Start: 2025-04-03

## 2025-04-04 LAB
ALBUMIN SERPL-MCNC: 3.7 G/DL (ref 3.5–4.6)
ALP SERPL-CCNC: 64 U/L (ref 40–130)
ALT SERPL-CCNC: 14 U/L (ref 0–33)
ANION GAP SERPL CALCULATED.3IONS-SCNC: 11 MEQ/L (ref 9–15)
AST SERPL-CCNC: 15 U/L (ref 0–35)
BILIRUB SERPL-MCNC: 0.3 MG/DL (ref 0.2–0.7)
BUN SERPL-MCNC: 34 MG/DL (ref 8–23)
CALCIUM SERPL-MCNC: 9.8 MG/DL (ref 8.5–9.9)
CHLORIDE SERPL-SCNC: 107 MEQ/L (ref 95–107)
CO2 SERPL-SCNC: 29 MEQ/L (ref 20–31)
CREAT SERPL-MCNC: 0.98 MG/DL (ref 0.5–0.9)
GLOBULIN SER CALC-MCNC: 2.8 G/DL (ref 2.3–3.5)
GLUCOSE SERPL-MCNC: 97 MG/DL (ref 70–99)
POTASSIUM SERPL-SCNC: 4.2 MEQ/L (ref 3.4–4.9)
PROT SERPL-MCNC: 6.5 G/DL (ref 6.3–8)
SODIUM SERPL-SCNC: 147 MEQ/L (ref 135–144)

## 2025-04-09 ENCOUNTER — TELEPHONE (OUTPATIENT)
Age: 65
End: 2025-04-09

## 2025-04-09 NOTE — TELEPHONE ENCOUNTER
PT called was in home doing therapy had Cande kneeling on 2 cushions was unable to get back up pain was 8/10 had to call 911 for assistance to getting her. Currently in recliner and pain has subsided to 5/10

## 2025-04-17 ENCOUNTER — OFFICE VISIT (OUTPATIENT)
Age: 65
End: 2025-04-17
Payer: COMMERCIAL

## 2025-04-17 VITALS
SYSTOLIC BLOOD PRESSURE: 124 MMHG | WEIGHT: 293 LBS | BODY MASS INDEX: 49.05 KG/M2 | HEART RATE: 74 BPM | OXYGEN SATURATION: 94 % | DIASTOLIC BLOOD PRESSURE: 84 MMHG

## 2025-04-17 DIAGNOSIS — I48.0 PAROXYSMAL ATRIAL FIBRILLATION (HCC): ICD-10-CM

## 2025-04-17 DIAGNOSIS — E78.2 MIXED HYPERLIPIDEMIA: ICD-10-CM

## 2025-04-17 DIAGNOSIS — R06.02 SHORTNESS OF BREATH: ICD-10-CM

## 2025-04-17 DIAGNOSIS — I50.9 HEART FAILURE, UNSPECIFIED HF CHRONICITY, UNSPECIFIED HEART FAILURE TYPE (HCC): ICD-10-CM

## 2025-04-17 DIAGNOSIS — N18.32 STAGE 3B CHRONIC KIDNEY DISEASE (HCC): ICD-10-CM

## 2025-04-17 DIAGNOSIS — I10 PRIMARY HYPERTENSION: Primary | ICD-10-CM

## 2025-04-17 PROCEDURE — 1036F TOBACCO NON-USER: CPT | Performed by: INTERNAL MEDICINE

## 2025-04-17 PROCEDURE — G8427 DOCREV CUR MEDS BY ELIG CLIN: HCPCS | Performed by: INTERNAL MEDICINE

## 2025-04-17 PROCEDURE — 3017F COLORECTAL CA SCREEN DOC REV: CPT | Performed by: INTERNAL MEDICINE

## 2025-04-17 PROCEDURE — 99214 OFFICE O/P EST MOD 30 MIN: CPT | Performed by: INTERNAL MEDICINE

## 2025-04-17 PROCEDURE — 3079F DIAST BP 80-89 MM HG: CPT | Performed by: INTERNAL MEDICINE

## 2025-04-17 PROCEDURE — G8417 CALC BMI ABV UP PARAM F/U: HCPCS | Performed by: INTERNAL MEDICINE

## 2025-04-17 PROCEDURE — 3074F SYST BP LT 130 MM HG: CPT | Performed by: INTERNAL MEDICINE

## 2025-04-17 PROCEDURE — 93000 ELECTROCARDIOGRAM COMPLETE: CPT | Performed by: INTERNAL MEDICINE

## 2025-04-17 RX ORDER — CHOLECALCIFEROL (VITAMIN D3) 25 MCG
CAPSULE ORAL DAILY
COMMUNITY

## 2025-04-17 NOTE — PROGRESS NOTES
2025    Kirsten Aguilera, APRN - CNP  [address] [place] [address]    RE: Cande Richey   : 1960     Dear Kirsten Aguilera, TRAE - CNP :    I had the pleasure of seeing your patient, Cande Richey in the office today on 2025.    As you are well aware, Ms. Richey is a pleasant 64 y.o.  female who was kindly referred to me for evaluation of dyspnea atrial fibrillation.  She was diagnosed with atrial fibrillation 4 years ago and was previously following with a cardiologist in [place], last seen May 2023.  She had a cardiac catheterization 2021 and showed me results revealing normal coronary arteries with EF 55%.  She was recently hospitalized at [facility] in August.  Currently, she reports intermittent palpitations from atrial fibrillation described as \"pounding\" heartbeats that occur 2-3 times a week.  Most episodes last a few minutes before gradually resolving.  She denies associated near-syncope or syncope.  She has occasional right-sided chest discomfort which radiates to the back.  No midsternal left-sided chest discomfort with activity.  She reports compliance with medications including Eliquis metoprolol, and flecainide.      23: Patient here for follow-up of paroxysmal atrial fibrillation and recent event monitor results.  Currently, she reports feeling better.  She reports less palpitations.  No chest pain.  No worsening exertional dyspnea.  She reports compliance with medications including Eliquis.    24: Patient here for follow-up of paroxysmal atrial fibrillation on flecainide, metoprolol, and Eliquis.  Currently, she reports feeling reasonably well.  She denies any significant palpitations on rate/rhythm control therapy.  No near-syncope or syncope.  She still has dyspnea with more physical degrees of activity but this is chronic and unchanged.  She denies any anginal chest pain.  She reports compliance with medications.  She saw sleep

## 2025-04-17 NOTE — PATIENT INSTRUCTIONS
Stress test  Refer to nephrology for diuretic management   Continue current medications  Follow up in 4 weeks.

## 2025-04-25 ENCOUNTER — TELEPHONE (OUTPATIENT)
Age: 65
End: 2025-04-25

## 2025-04-25 NOTE — TELEPHONE ENCOUNTER
Correct but the nurse needs to just report it so it can get documented that she informed the office and a provider.

## 2025-04-25 NOTE — TELEPHONE ENCOUNTER
[facility] called to report pt has had 4lbs weight gain in 2 days has 2+ edema in lower extremities is taking 20mg of lasix daily. Has appt scheduled with Nephrology but not till May 15 to address kidney damage due to diuretic.

## 2025-04-26 DIAGNOSIS — K21.9 GASTROESOPHAGEAL REFLUX DISEASE WITHOUT ESOPHAGITIS: ICD-10-CM

## 2025-04-29 RX ORDER — OMEPRAZOLE 40 MG/1
40 CAPSULE, DELAYED RELEASE ORAL DAILY
Qty: 90 CAPSULE | Refills: 2 | Status: SHIPPED | OUTPATIENT
Start: 2025-04-29

## 2025-05-01 PROBLEM — G47.33 OSA (OBSTRUCTIVE SLEEP APNEA): Status: ACTIVE | Noted: 2025-05-01

## 2025-05-19 ENCOUNTER — TRANSCRIBE ORDERS (OUTPATIENT)
Dept: ADMINISTRATIVE | Age: 65
End: 2025-05-19

## 2025-05-19 DIAGNOSIS — N18.2 CHRONIC KIDNEY DISEASE, STAGE II (MILD): Primary | ICD-10-CM

## 2025-05-24 ENCOUNTER — HOSPITAL ENCOUNTER (OUTPATIENT)
Dept: ULTRASOUND IMAGING | Age: 65
Discharge: HOME | End: 2025-05-26
Attending: INTERNAL MEDICINE
Payer: COMMERCIAL

## 2025-05-24 DIAGNOSIS — N18.2 CHRONIC KIDNEY DISEASE, STAGE II (MILD): ICD-10-CM

## 2025-05-24 PROCEDURE — 76770 US EXAM ABDO BACK WALL COMP: CPT

## 2025-06-09 ENCOUNTER — HOSPITAL ENCOUNTER (OUTPATIENT)
Dept: NUCLEAR MEDICINE | Age: 65
Discharge: HOME | End: 2025-06-11
Attending: INTERNAL MEDICINE
Payer: COMMERCIAL

## 2025-06-09 ENCOUNTER — HOSPITAL ENCOUNTER (OUTPATIENT)
Age: 65
Discharge: HOME | End: 2025-06-11
Attending: INTERNAL MEDICINE
Payer: COMMERCIAL

## 2025-06-09 DIAGNOSIS — R06.02 SHORTNESS OF BREATH: ICD-10-CM

## 2025-06-09 PROCEDURE — 3430000000 HC RX DIAGNOSTIC RADIOPHARMACEUTICAL: Performed by: INTERNAL MEDICINE

## 2025-06-09 PROCEDURE — A9502 TC99M TETROFOSMIN: HCPCS | Performed by: INTERNAL MEDICINE

## 2025-06-09 PROCEDURE — 78452 HT MUSCLE IMAGE SPECT MULT: CPT

## 2025-06-09 PROCEDURE — 6360000002 HC RX W HCPCS: Performed by: INTERNAL MEDICINE

## 2025-06-09 PROCEDURE — 93017 CV STRESS TEST TRACING ONLY: CPT

## 2025-06-09 PROCEDURE — 2500000003 HC RX 250 WO HCPCS: Performed by: INTERNAL MEDICINE

## 2025-06-09 RX ORDER — SODIUM CHLORIDE 0.9 % (FLUSH) 0.9 %
10 SYRINGE (ML) INJECTION PRN
Status: DISCONTINUED | OUTPATIENT
Start: 2025-06-09 | End: 2025-06-12 | Stop reason: HOSPADM

## 2025-06-09 RX ORDER — REGADENOSON 0.08 MG/ML
0.4 INJECTION, SOLUTION INTRAVENOUS
Status: COMPLETED | OUTPATIENT
Start: 2025-06-09 | End: 2025-06-09

## 2025-06-09 RX ADMIN — SODIUM CHLORIDE, PRESERVATIVE FREE 10 ML: 5 INJECTION INTRAVENOUS at 09:17

## 2025-06-09 RX ADMIN — TETROFOSMIN 36 MILLICURIE: 1.38 INJECTION, POWDER, LYOPHILIZED, FOR SOLUTION INTRAVENOUS at 09:16

## 2025-06-09 RX ADMIN — SODIUM CHLORIDE, PRESERVATIVE FREE 10 ML: 5 INJECTION INTRAVENOUS at 09:15

## 2025-06-09 RX ADMIN — REGADENOSON 0.4 MG: 0.08 INJECTION, SOLUTION INTRAVENOUS at 09:16

## 2025-06-10 ENCOUNTER — HOSPITAL ENCOUNTER (OUTPATIENT)
Dept: NUCLEAR MEDICINE | Age: 65
Discharge: HOME | End: 2025-06-12
Attending: INTERNAL MEDICINE
Payer: COMMERCIAL

## 2025-06-10 LAB
NUC STRESS EJECTION FRACTION: 61 %
STRESS BASELINE DIAS BP: 90 MMHG
STRESS BASELINE HR: 84 BPM
STRESS BASELINE SYS BP: 130 MMHG
STRESS ESTIMATED WORKLOAD: 1 METS
STRESS PEAK DIAS BP: 90 MMHG
STRESS PEAK SYS BP: 130 MMHG
STRESS PERCENT HR ACHIEVED: 59 %
STRESS POST PEAK HR: 93 BPM
STRESS RATE PRESSURE PRODUCT: NORMAL BPM*MMHG
STRESS ST DEPRESSION: 0 MM
STRESS TARGET HR: 157 BPM
TID: 1.24

## 2025-06-10 PROCEDURE — 78452 HT MUSCLE IMAGE SPECT MULT: CPT | Performed by: INTERNAL MEDICINE

## 2025-06-10 PROCEDURE — 3430000000 HC RX DIAGNOSTIC RADIOPHARMACEUTICAL: Performed by: INTERNAL MEDICINE

## 2025-06-10 PROCEDURE — A9502 TC99M TETROFOSMIN: HCPCS | Performed by: INTERNAL MEDICINE

## 2025-06-10 PROCEDURE — 93016 CV STRESS TEST SUPVJ ONLY: CPT | Performed by: INTERNAL MEDICINE

## 2025-06-10 PROCEDURE — 93018 CV STRESS TEST I&R ONLY: CPT | Performed by: INTERNAL MEDICINE

## 2025-06-10 RX ADMIN — TETROFOSMIN 34.4 MILLICURIE: 1.38 INJECTION, POWDER, LYOPHILIZED, FOR SOLUTION INTRAVENOUS at 09:09

## 2025-06-11 ENCOUNTER — TELEPHONE (OUTPATIENT)
Age: 65
End: 2025-06-11

## 2025-06-11 NOTE — TELEPHONE ENCOUNTER
Received a call from [facility] Pharmacist. Reviewed patient's cardiac medications that Dr. Adam had prescribed for the patient:  Eliquis 5mg PO BID  Flecainide 50mg PO BID  Lopressor 100mg PO BID    Pharmacist would like Dr. Adam to be aware that it appears that the patient is having some \"adherence to Eliquis regimen\" problems. Per the patient's history, it appears that patient is only picking up her prescription about 71% of the time. Patient has a follow up appointment with Dr. Adam 06/16/2025. Pharmacist wondering if Dr. Adam could reinforce at the patient's visit the importance of making sure that she is taking Eliquis without interruption, unless specified by Dr. Adam.

## 2025-06-16 ENCOUNTER — OFFICE VISIT (OUTPATIENT)
Age: 65
End: 2025-06-16
Payer: COMMERCIAL

## 2025-06-16 VITALS
WEIGHT: 293 LBS | SYSTOLIC BLOOD PRESSURE: 132 MMHG | BODY MASS INDEX: 48.51 KG/M2 | OXYGEN SATURATION: 95 % | DIASTOLIC BLOOD PRESSURE: 88 MMHG | HEART RATE: 75 BPM

## 2025-06-16 DIAGNOSIS — I10 PRIMARY HYPERTENSION: ICD-10-CM

## 2025-06-16 DIAGNOSIS — I48.0 PAROXYSMAL ATRIAL FIBRILLATION (HCC): Primary | ICD-10-CM

## 2025-06-16 DIAGNOSIS — E78.2 MIXED HYPERLIPIDEMIA: ICD-10-CM

## 2025-06-16 DIAGNOSIS — I48.0 PAROXYSMAL ATRIAL FIBRILLATION (HCC): ICD-10-CM

## 2025-06-16 LAB
ANION GAP SERPL CALCULATED.3IONS-SCNC: 13 MEQ/L (ref 9–15)
BUN SERPL-MCNC: 18 MG/DL (ref 8–23)
CALCIUM SERPL-MCNC: 10.3 MG/DL (ref 8.5–9.9)
CHLORIDE SERPL-SCNC: 99 MEQ/L (ref 95–107)
CO2 SERPL-SCNC: 28 MEQ/L (ref 20–31)
CREAT SERPL-MCNC: 0.94 MG/DL (ref 0.5–0.9)
ERYTHROCYTE [DISTWIDTH] IN BLOOD BY AUTOMATED COUNT: 15.5 % (ref 11.5–14.5)
GLUCOSE SERPL-MCNC: 86 MG/DL (ref 70–99)
HCT VFR BLD AUTO: 44.7 % (ref 37–47)
HGB BLD-MCNC: 14.1 G/DL (ref 12–16)
INR PPP: 1.3
MCH RBC QN AUTO: 27.3 PG (ref 27–31.3)
MCHC RBC AUTO-ENTMCNC: 31.5 % (ref 33–37)
MCV RBC AUTO: 86.5 FL (ref 79.4–94.8)
PLATELET # BLD AUTO: 265 K/UL (ref 130–400)
POTASSIUM SERPL-SCNC: 3.8 MEQ/L (ref 3.4–4.9)
PROTHROMBIN TIME: 16.5 SEC (ref 12.3–14.9)
RBC # BLD AUTO: 5.17 M/UL (ref 4.2–5.4)
SODIUM SERPL-SCNC: 140 MEQ/L (ref 135–144)
WBC # BLD AUTO: 6.2 K/UL (ref 4.8–10.8)

## 2025-06-16 PROCEDURE — 3075F SYST BP GE 130 - 139MM HG: CPT | Performed by: INTERNAL MEDICINE

## 2025-06-16 PROCEDURE — 3079F DIAST BP 80-89 MM HG: CPT | Performed by: INTERNAL MEDICINE

## 2025-06-16 PROCEDURE — G8417 CALC BMI ABV UP PARAM F/U: HCPCS | Performed by: INTERNAL MEDICINE

## 2025-06-16 PROCEDURE — 93000 ELECTROCARDIOGRAM COMPLETE: CPT | Performed by: INTERNAL MEDICINE

## 2025-06-16 PROCEDURE — 1036F TOBACCO NON-USER: CPT | Performed by: INTERNAL MEDICINE

## 2025-06-16 PROCEDURE — 99214 OFFICE O/P EST MOD 30 MIN: CPT | Performed by: INTERNAL MEDICINE

## 2025-06-16 PROCEDURE — 3017F COLORECTAL CA SCREEN DOC REV: CPT | Performed by: INTERNAL MEDICINE

## 2025-06-16 PROCEDURE — G8428 CUR MEDS NOT DOCUMENT: HCPCS | Performed by: INTERNAL MEDICINE

## 2025-06-16 RX ORDER — SODIUM CHLORIDE 9 MG/ML
INJECTION, SOLUTION INTRAVENOUS PRN
OUTPATIENT
Start: 2025-06-16

## 2025-06-16 RX ORDER — SODIUM CHLORIDE 0.9 % (FLUSH) 0.9 %
5-40 SYRINGE (ML) INJECTION EVERY 12 HOURS SCHEDULED
OUTPATIENT
Start: 2025-06-16

## 2025-06-16 RX ORDER — SODIUM CHLORIDE 0.9 % (FLUSH) 0.9 %
5-40 SYRINGE (ML) INJECTION PRN
OUTPATIENT
Start: 2025-06-16

## 2025-06-16 RX ORDER — TIRZEPATIDE 5 MG/.5ML
INJECTION, SOLUTION SUBCUTANEOUS WEEKLY
COMMUNITY

## 2025-06-16 RX ORDER — ISOSORBIDE MONONITRATE 30 MG/1
30 TABLET, EXTENDED RELEASE ORAL DAILY
Qty: 30 TABLET | Refills: 3 | Status: SHIPPED | OUTPATIENT
Start: 2025-06-16

## 2025-06-16 NOTE — PATIENT INSTRUCTIONS
Cardiac catheterization to be arranged.  Start isosorbide 30 mg daily. If you get a headache, try Tylenol. If it persists, notify as we may need to adjust dose.   Avoid any intense physical activity until after the cardiac catheterization.   Follow up to be determined after the procedure.   If symptoms worsen, go to ER!    *You were seen today by Dr. Adam and PJ Nevarez. You may receive a survey regarding your experience today. If you had a good experience, please consider giving a positive review.

## 2025-06-16 NOTE — PROGRESS NOTES
membranes. Neck is supple without JVD or carotid bruits. No thyroidmegaly. Lungs are clear to auscultation both anteriorly and posteriorly. No accessory muscle usage. Heart is a regular rate and rhythm.  No significant murmur. No S3 or S4. PMI is nondisplaced. Abdomen is soft and non tender.  Obese.  No appreciable hepatosplenomegaly. No abdominal aortic bruits or masses. Lower extremities with 2+ nonpitting edema bilaterally right greater than left leg with chronic venous changes. No clubbing or cyanosis. Neurologically, cranial nerves are grossly intact. No focal sensory and/or motor deficits. Skin is intact without rash or lesions.       ECG (6/16/2025): Sinus rhythm.  Heart rate 73 bpm.  Left anterior fascicular block.  Poor R wave progression.   ms.  QTc 430 ms      ECHO (TTE) COMPLETE (CONTRAST/BUBBLE/3D PRN) 08/29/2023  3:02 PM (Final)  Interpretation Summary    Left Ventricle: Normal left ventricular systolic function with a visually estimated EF of 55 - 60%. Left ventricle size is normal. Normal wall thickness. Normal wall motion. Diastolic dysfunction present with normal LV EF.    Last 2-week event monitor reveals 1 short isolated jeferson of paroxysmal SVT but no atrial fibrillation.  Rare PVCs.  Episodes of palpitations predominantly correlated to sinus rhythm.    Lexiscan Myoview stress test (6/10/2025): Abnormal and most consistent with myocardial ischemia.  Moderate sized perfusion defect in the anterior segment that is reversible.  EF 61%.      IMPRESSION:  Precordial pain concerning for angina.  Abnormal stress test.  Symptomatic palpitations due to possible recurrences of paroxysmal atrial fibrillation, now improved on higher dose beta-blocker  Paroxysmal atrial fibrillation/SVT, currently sinus on rate/rhythm control.  Eliquis coagulopathy, currently back on Eliquis  Historically normal LV systolic function, EF 55 to 60%.  Historically normal coronary arteries (cardiac cath 5/4/2021 in

## 2025-06-17 ENCOUNTER — CLINICAL DOCUMENTATION (OUTPATIENT)
Age: 65
End: 2025-06-17

## 2025-06-17 DIAGNOSIS — M10.9 ACUTE GOUT OF MULTIPLE SITES, UNSPECIFIED CAUSE: ICD-10-CM

## 2025-06-17 RX ORDER — FEBUXOSTAT 80 MG/1
80 TABLET, FILM COATED ORAL DAILY
Qty: 30 TABLET | Refills: 2 | Status: SHIPPED | OUTPATIENT
Start: 2025-06-17

## 2025-06-17 RX ORDER — FESOTERODINE FUMARATE 8 MG/1
1 TABLET, FILM COATED, EXTENDED RELEASE ORAL DAILY
Qty: 30 TABLET | Refills: 5 | Status: SHIPPED | OUTPATIENT
Start: 2025-06-17

## 2025-06-20 ENCOUNTER — TELEPHONE (OUTPATIENT)
Age: 65
End: 2025-06-20

## 2025-06-20 NOTE — TELEPHONE ENCOUNTER
Received denial from [facility] regarding cardiac catheterization. States not medically necessary. Requesting further imaging like a CT. Please advise.

## 2025-06-23 RX ORDER — FLECAINIDE ACETATE 50 MG/1
50 TABLET ORAL 2 TIMES DAILY
Qty: 180 TABLET | Refills: 3 | Status: SHIPPED | OUTPATIENT
Start: 2025-06-23

## 2025-06-23 NOTE — TELEPHONE ENCOUNTER
Requesting medication refill VIA FAX.  Please approve or deny this request.    Rx requested:  Requested Prescriptions     Pending Prescriptions Disp Refills    flecainide (TAMBOCOR) 50 MG tablet 180 tablet 3     Sig: Take 1 tablet by mouth 2 times daily Indications: Heart Rhythm Disorder         Last Office Visit:   6/16/2025      Next Visit Date:  Future Appointments   Date Time Provider Department Center   9/16/2025 11:00 AM Kirsten Aguilera, TRAE - CNP [facility] ECC DEP

## 2025-06-25 NOTE — TELEPHONE ENCOUNTER
Attempted to schedule peer to peer. Advised need to appeal first with [facility]. Letter faxed

## 2025-07-02 RX ORDER — ISOSORBIDE MONONITRATE 30 MG/1
30 TABLET, EXTENDED RELEASE ORAL DAILY
Qty: 30 TABLET | Refills: 3 | Status: SHIPPED | OUTPATIENT
Start: 2025-07-02

## 2025-07-02 NOTE — TELEPHONE ENCOUNTER
Requesting medication refill. Please approve or deny this request.    Rx requested:  Requested Prescriptions     Pending Prescriptions Disp Refills    isosorbide mononitrate (IMDUR) 30 MG extended release tablet 30 tablet 3     Sig: Take 1 tablet by mouth daily         Last Office Visit:   6/16/2025      Next Visit Date:  Future Appointments   Date Time Provider Department Center   9/16/2025 11:00 AM Kirsten Aguilera, APRN - CNP [facility] ECC DEP

## 2025-07-10 ENCOUNTER — TELEPHONE (OUTPATIENT)
Age: 65
End: 2025-07-10

## 2025-07-10 DIAGNOSIS — R94.39 ABNORMAL STRESS TEST: ICD-10-CM

## 2025-07-10 DIAGNOSIS — R94.39 ABNORMAL CARDIOVASCULAR STRESS TEST: Primary | ICD-10-CM

## 2025-07-17 ENCOUNTER — OFFICE VISIT (OUTPATIENT)
Age: 65
End: 2025-07-17
Payer: COMMERCIAL

## 2025-07-17 VITALS
HEART RATE: 74 BPM | TEMPERATURE: 97 F | BODY MASS INDEX: 45.99 KG/M2 | SYSTOLIC BLOOD PRESSURE: 122 MMHG | HEIGHT: 67 IN | OXYGEN SATURATION: 96 % | WEIGHT: 293 LBS | DIASTOLIC BLOOD PRESSURE: 62 MMHG

## 2025-07-17 DIAGNOSIS — I50.9 HEART FAILURE, UNSPECIFIED HF CHRONICITY, UNSPECIFIED HEART FAILURE TYPE (HCC): ICD-10-CM

## 2025-07-17 DIAGNOSIS — R26.2 DIFFICULTY IN WALKING: ICD-10-CM

## 2025-07-17 DIAGNOSIS — B96.20 E. COLI UTI: ICD-10-CM

## 2025-07-17 DIAGNOSIS — N39.0 E. COLI UTI: ICD-10-CM

## 2025-07-17 DIAGNOSIS — R53.1 WEAKNESS: ICD-10-CM

## 2025-07-17 DIAGNOSIS — L71.0 PERIORAL DERMATITIS: Primary | ICD-10-CM

## 2025-07-17 PROCEDURE — 3074F SYST BP LT 130 MM HG: CPT | Performed by: NURSE PRACTITIONER

## 2025-07-17 PROCEDURE — 3078F DIAST BP <80 MM HG: CPT | Performed by: NURSE PRACTITIONER

## 2025-07-17 PROCEDURE — 3017F COLORECTAL CA SCREEN DOC REV: CPT | Performed by: NURSE PRACTITIONER

## 2025-07-17 PROCEDURE — 99214 OFFICE O/P EST MOD 30 MIN: CPT | Performed by: NURSE PRACTITIONER

## 2025-07-17 PROCEDURE — 1036F TOBACCO NON-USER: CPT | Performed by: NURSE PRACTITIONER

## 2025-07-17 PROCEDURE — G8417 CALC BMI ABV UP PARAM F/U: HCPCS | Performed by: NURSE PRACTITIONER

## 2025-07-17 PROCEDURE — G8427 DOCREV CUR MEDS BY ELIG CLIN: HCPCS | Performed by: NURSE PRACTITIONER

## 2025-07-17 RX ORDER — NITROFURANTOIN 25; 75 MG/1; MG/1
100 CAPSULE ORAL 2 TIMES DAILY
Qty: 10 CAPSULE | Refills: 0 | Status: SHIPPED | OUTPATIENT
Start: 2025-07-17 | End: 2025-07-22

## 2025-07-17 RX ORDER — METRONIDAZOLE TOPICAL 7.5 MG/G
GEL TOPICAL
Qty: 45 G | Refills: 1 | Status: SHIPPED | OUTPATIENT
Start: 2025-07-17

## 2025-07-17 ASSESSMENT — PATIENT HEALTH QUESTIONNAIRE - PHQ9
1. LITTLE INTEREST OR PLEASURE IN DOING THINGS: NOT AT ALL
2. FEELING DOWN, DEPRESSED OR HOPELESS: NOT AT ALL
SUM OF ALL RESPONSES TO PHQ QUESTIONS 1-9: 0
2. FEELING DOWN, DEPRESSED OR HOPELESS: NOT AT ALL
SUM OF ALL RESPONSES TO PHQ QUESTIONS 1-9: 0
SUM OF ALL RESPONSES TO PHQ9 QUESTIONS 1 & 2: 0
SUM OF ALL RESPONSES TO PHQ QUESTIONS 1-9: 0
1. LITTLE INTEREST OR PLEASURE IN DOING THINGS: NOT AT ALL
SUM OF ALL RESPONSES TO PHQ QUESTIONS 1-9: 0

## 2025-07-17 NOTE — PROGRESS NOTES
[facility]  [facility]  [address] [address]  Dept: [phone]  Dept Fax: [phone]  Loc: [phone]     Subjective  Cande Richey, 64 y.o. female Established patient presents today with:  Chief Complaint   Patient presents with    Rash     Sx started on 25. Its around mouth and chin. Pain present        History of Present Illness  The patient presents for evaluation of a rash.    She reports that the rash began on Saturday, initially presenting as a small spot. Accompanying this was a tingling sensation in her lips, leading her to suspect it might be a cold sore. Despite applying Abreva for 3 days, the rash continued to spread. She has been managing the condition by washing with antibacterial soap, applying Neosporin, and taking Benadryl to alleviate the itching and pain. The rash is also itchy.    Additionally, she mentions a possible urinary tract infection (UTI). Her nephrologist ordered blood work and urine cultures, which were abnormal. However, no antibiotics were prescribed during her visit yesterday. She expresses concern about an upcoming heart catheterization scheduled for next Wednesday, fearing that an infection might prevent the procedure. It has been a year since her last UTI medication.    She requests a new handicap placard as her previous one  over 2 years ago.      Past Medical History:   Diagnosis Date    Atrial fibrillation (HCC)     Hyperlipidemia     Hypertension     Thyroid disease      Past Surgical History:   Procedure Laterality Date    COLONOSCOPY N/A 2025    COLONOSCOPY DIAGNOSTIC performed by Mariah Nelson MD at [facility] CENTER    JOINT REPLACEMENT      STIMULATOR SURGERY N/A 2024    MEDTRONIC STAGE 1 AND STAGE 2 INTERSTIM performed [place] Sabine [place] [facility] [place]    THYROIDECTOMY       Immunization History   Administered Date(s)

## 2025-07-18 RX ORDER — ATORVASTATIN CALCIUM 40 MG/1
40 TABLET, FILM COATED ORAL NIGHTLY
Qty: 30 TABLET | Refills: 0 | Status: SHIPPED | OUTPATIENT
Start: 2025-07-18

## 2025-07-22 NOTE — PROGRESS NOTES
Spoke with patient, aware to arrive at 9am for procedure. Npo after midnight. Must have . Has held eliquis as ordered.

## 2025-07-23 ENCOUNTER — HOSPITAL ENCOUNTER (OUTPATIENT)
Age: 65
Setting detail: OUTPATIENT SURGERY
Discharge: HOME | End: 2025-07-23
Attending: INTERNAL MEDICINE | Admitting: INTERNAL MEDICINE
Payer: COMMERCIAL

## 2025-07-23 VITALS
OXYGEN SATURATION: 94 % | DIASTOLIC BLOOD PRESSURE: 69 MMHG | SYSTOLIC BLOOD PRESSURE: 103 MMHG | HEART RATE: 70 BPM | HEIGHT: 67 IN | BODY MASS INDEX: 45.99 KG/M2 | TEMPERATURE: 98.2 F | WEIGHT: 293 LBS | RESPIRATION RATE: 17 BRPM

## 2025-07-23 DIAGNOSIS — R94.39 ABNORMAL STRESS TEST: ICD-10-CM

## 2025-07-23 PROCEDURE — 7100000011 HC PHASE II RECOVERY - ADDTL 15 MIN: Performed by: INTERNAL MEDICINE

## 2025-07-23 PROCEDURE — C1894 INTRO/SHEATH, NON-LASER: HCPCS | Performed by: INTERNAL MEDICINE

## 2025-07-23 PROCEDURE — 93458 L HRT ARTERY/VENTRICLE ANGIO: CPT | Performed by: INTERNAL MEDICINE

## 2025-07-23 PROCEDURE — 2709999900 HC NON-CHARGEABLE SUPPLY: Performed by: INTERNAL MEDICINE

## 2025-07-23 PROCEDURE — C1760 CLOSURE DEV, VASC: HCPCS | Performed by: INTERNAL MEDICINE

## 2025-07-23 PROCEDURE — 93005 ELECTROCARDIOGRAM TRACING: CPT | Performed by: INTERNAL MEDICINE

## 2025-07-23 PROCEDURE — 99152 MOD SED SAME PHYS/QHP 5/>YRS: CPT | Performed by: INTERNAL MEDICINE

## 2025-07-23 PROCEDURE — 7100000010 HC PHASE II RECOVERY - FIRST 15 MIN: Performed by: INTERNAL MEDICINE

## 2025-07-23 PROCEDURE — 6360000004 HC RX CONTRAST MEDICATION: Performed by: INTERNAL MEDICINE

## 2025-07-23 PROCEDURE — C1769 GUIDE WIRE: HCPCS | Performed by: INTERNAL MEDICINE

## 2025-07-23 PROCEDURE — 6360000002 HC RX W HCPCS: Performed by: INTERNAL MEDICINE

## 2025-07-23 PROCEDURE — 99153 MOD SED SAME PHYS/QHP EA: CPT | Performed by: INTERNAL MEDICINE

## 2025-07-23 RX ORDER — IOPAMIDOL 612 MG/ML
INJECTION, SOLUTION INTRAVASCULAR PRN
Status: DISCONTINUED | OUTPATIENT
Start: 2025-07-23 | End: 2025-07-23 | Stop reason: HOSPADM

## 2025-07-23 RX ORDER — SODIUM CHLORIDE 0.9 % (FLUSH) 0.9 %
5-40 SYRINGE (ML) INJECTION EVERY 12 HOURS SCHEDULED
Status: DISCONTINUED | OUTPATIENT
Start: 2025-07-23 | End: 2025-07-23 | Stop reason: HOSPADM

## 2025-07-23 RX ORDER — SODIUM CHLORIDE 0.9 % (FLUSH) 0.9 %
5-40 SYRINGE (ML) INJECTION PRN
Status: DISCONTINUED | OUTPATIENT
Start: 2025-07-23 | End: 2025-07-23 | Stop reason: HOSPADM

## 2025-07-23 RX ORDER — MIDAZOLAM HYDROCHLORIDE 1 MG/ML
INJECTION, SOLUTION INTRAMUSCULAR; INTRAVENOUS PRN
Status: DISCONTINUED | OUTPATIENT
Start: 2025-07-23 | End: 2025-07-23 | Stop reason: HOSPADM

## 2025-07-23 RX ORDER — SODIUM CHLORIDE 9 MG/ML
INJECTION, SOLUTION INTRAVENOUS PRN
Status: DISCONTINUED | OUTPATIENT
Start: 2025-07-23 | End: 2025-07-23 | Stop reason: HOSPADM

## 2025-07-23 RX ORDER — LIDOCAINE HYDROCHLORIDE 10 MG/ML
INJECTION, SOLUTION INFILTRATION; PERINEURAL PRN
Status: DISCONTINUED | OUTPATIENT
Start: 2025-07-23 | End: 2025-07-23 | Stop reason: HOSPADM

## 2025-07-23 NOTE — PROGRESS NOTES
Patient returned to pre/post cath for recovery. Bedside report received from Traci ACHARYA. Right groin access site closed with mynx closure device. Soft, non-tender, no swelling or bleeding. Pt provided with warm blankets and po fluids. Aware of 2 hour bedrest order. Son to be updated.

## 2025-07-23 NOTE — H&P
Patient is a 64 y.o. female who presents with a chief complaint of CP and to undergo LHC. Patient is followed on a regular basis by Dr. Aguilera, Kirsten P, APRN - CNP.     Past Medical History:   Diagnosis Date    Atrial fibrillation (HCC)     Hyperlipidemia     Hypertension     Thyroid disease       Patient Active Problem List   Diagnosis    Weakness    Difficulty in walking    Primary hypertension    Paroxysmal atrial fibrillation (HCC)    Chronic gout of multiple sites    Acute gout of multiple sites    Gastroesophageal reflux disease without esophagitis    Heart failure, unspecified (HCC)    Hyperlipidemia, unspecified    Hypothyroidism, unspecified    Other cervical disc degeneration, unspecified cervical region    Other intervertebral disc degeneration, thoracolumbar region    Spondylolisthesis, lumbar region    Gout, unspecified    Effusion, right knee    Anemia, unspecified    Rash    Urge incontinence    Stage 3b chronic kidney disease (HCC)    Headache    Conjunctival hemorrhage of left eye    GIB (gastrointestinal bleeding)    Blood per rectum    Hematochezia    Other hemorrhoids    JACOB (obstructive sleep apnea)    Abnormal stress test       Past Surgical History:   Procedure Laterality Date    COLONOSCOPY N/A 2/28/2025    COLONOSCOPY DIAGNOSTIC performed by Mariah Nelson MD at [facility] CENTER    JOINT REPLACEMENT      STIMULATOR SURGERY N/A 2/8/2024    MEDTRONIC STAGE 1 AND STAGE 2 INTERSTIM performed by Sabine Bradford MD at [facility] OR    THYROIDECTOMY         Social History     Socioeconomic History    Marital status:    Tobacco Use    Smoking status: Never     Passive exposure: Never    Smokeless tobacco: Never   Vaping Use    Vaping status: Never Used   Substance and Sexual Activity    Alcohol use: Not Currently    Drug use: Never    Sexual activity: Defer     Social Drivers of Health     Financial Resource Strain: Low Risk  (10/24/2024)    Overall Financial Resource Strain (CARDIA)

## 2025-07-23 NOTE — PROGRESS NOTES
Patient received discharge instructions. Aware of no new medications, to resume eliquis tomorrow morning. Aware to follow up with dr. Adam in 6 months. Post-op groin care reviewed. Aware to limit stairs, bath soaking/pools etc. Son transporting patient home. Pt denies further questions or concerns.

## 2025-07-23 NOTE — PROGRESS NOTES
Consent obtained, sinus on monitor. Bedside report given to Oleksandr ACHARYA. Son , Hernan updated via [employer] text.pt to cath lab at this time for procedure.

## 2025-07-23 NOTE — BRIEF OP NOTE
Section of Cardiology  Adult Brief Cardiac Cath Procedure Note        Procedure(s):  LHC, b/l coronary angio    Pre-operative Diagnosis: Abnormal stress test    H&P Status: Completed and reviewed.       Findings:  See full report    LV ejection fraction of 55%.  LVEDP of 20 to 23 mmHg    Normal coronary arteries    Estimated blood loss    Complications:  none    Primary Proceduralist:   Dr.Wes Chávez DO      Full procedure note to follow

## 2025-07-23 NOTE — PROGRESS NOTES
Sedation Pre- Procedure Evaluation    Patient name: Cande Richey  YOB: 1960  MRN: [id number]   Allergies:   Erythromycin and Allopurinol        Type Of Sedation  [x]local anesthesia  [x]moderate (conscious sedation)  []deep/analgesia      RN Focused History    Yes        No  N/A  []   [x]  Previous problem with airway anesthesia, sedation, or family history of the same    []   [x]  History Of tobacco, alcohol, or substance abuse     [x]   []  Problems associated with stridor, snoring, or sleep apnea    []   [] [x] Pediatric patient, history of premature birth or ventilator support (Moderate Sedation Only)     [x]   [] [] Moderate Sedation: Clear liquids within 6 hours of sedation and NPO within 2 hours    [x]   [] [] Deep Sedation:Clear liquids within 6 hours of sedation and NPO within 2 hours    JACOB, does not wear CPAP    NPO since: 7/22/25 1800       Vitals, Cardiac Rhythm, Pain:   Vitals  Temp: 98.2 °F (36.8 °C)  Temp Source: Oral  Pulse: 77  Heart Rate Source: Monitor  Respirations: 18  BP: 116/77  MAP (Calculated): 90  Patient Position: Semi fowlers  Cardiac Rhythm: Sinus rhythm  Pain Assessment  Pain Assessment: None - Denies Pain      EKG:  EKG Interpretation: normal EKG, normal sinus rhythm, unchanged from previous tracings.      Porsha Scale: Activity: Able to move four extremities voluntarily or on command  Respiration: Able to breathe and cough freely  Circulation: Blood pressure within 20% of preanesthesia level  Consciousness: Fully awake  Oxygen: SAO2 > 92% on room air   Porsha Score: 10     Activity:  2 - Able to move 4 extremities voluntarily on command  Respiration:  2 - Able to breathe deeply and cough freely  Circulation:  2 - BP+/- 20mmHg of normal  Consciousness:  2 - Fully awake  Oxygen Saturation (color):  2 - Able to maintain oxygen saturation >92% on room air      Prior to Admission medications    Medication Sig Start Date End Date Taking? Authorizing Provider

## 2025-07-24 ENCOUNTER — TELEPHONE (OUTPATIENT)
Age: 65
End: 2025-07-24

## 2025-07-24 LAB
EKG ATRIAL RATE: 79 BPM
EKG DIAGNOSIS: NORMAL
EKG P AXIS: 21 DEGREES
EKG P-R INTERVAL: 200 MS
EKG Q-T INTERVAL: 428 MS
EKG QRS DURATION: 96 MS
EKG QTC CALCULATION (BAZETT): 490 MS
EKG R AXIS: -48 DEGREES
EKG T AXIS: 10 DEGREES
EKG VENTRICULAR RATE: 79 BPM

## 2025-07-24 NOTE — TELEPHONE ENCOUNTER
Appointment Request From: Cande Richey      With Provider: Dr. Beverley Adam DO [[facility]]      Preferred Date Range: 7/29/2025 - 8/1/2025      Preferred Times: Any Time      Reason for visit: Request an Appointment      Health Maintenance Topic:      Comments:   Follow up after heart cath     Appointment Request  (Newest Message First)  Cande Richey  P [facility]  (supporting Beverley Adam DO)10 minutes ago (11:16 AM)     [facility]  Appointment Request From: Cande Richey     With Provider: Dr. Beverley Adam DO [[facility]]     Preferred Date Range: 7/29/2025 - 8/1/2025     Preferred Times: Any Time     Reason for visit: Request an Appointment     Health Maintenance Topic:      Comments:  Follow up after heart cath

## 2025-07-26 LAB — ECHO BSA: 2.55 M2

## 2025-07-29 ENCOUNTER — TELEPHONE (OUTPATIENT)
Age: 65
End: 2025-07-29

## 2025-07-29 NOTE — TELEPHONE ENCOUNTER
Appointment Request From: Cande Richey      With Provider: Dr. Beverley Adam DO [[facility]]      Preferred Date Range: 7/29/2025 - 8/8/2025      Preferred Times: Any Time      Reason for visit: Request an Appointment      Health Maintenance Topic:      Comments:   2nd request. Follow up with heart cath.     Appointment Request  (Newest Message First)  Cande Richey  P [facility]  (supporting Beverley Adam DO)3 hours ago (8:16 AM)     [facility]  Appointment Request From: Cande Richey     With Provider: Dr. Beverley Adam DO [[facility]]     Preferred Date Range: 7/29/2025 - 8/8/2025     Preferred Times: Any Time     Reason for visit: Request an Appointment     Health Maintenance Topic:      Comments:  2nd request. Follow up with heart cath.

## 2025-08-21 ENCOUNTER — OFFICE VISIT (OUTPATIENT)
Age: 65
End: 2025-08-21
Payer: COMMERCIAL

## 2025-08-21 VITALS
DIASTOLIC BLOOD PRESSURE: 78 MMHG | WEIGHT: 293 LBS | BODY MASS INDEX: 46.33 KG/M2 | OXYGEN SATURATION: 97 % | HEART RATE: 91 BPM | SYSTOLIC BLOOD PRESSURE: 128 MMHG

## 2025-08-21 DIAGNOSIS — I10 PRIMARY HYPERTENSION: Primary | ICD-10-CM

## 2025-08-21 DIAGNOSIS — I48.0 PAROXYSMAL ATRIAL FIBRILLATION (HCC): ICD-10-CM

## 2025-08-21 DIAGNOSIS — E03.9 HYPOTHYROIDISM, UNSPECIFIED TYPE: ICD-10-CM

## 2025-08-21 DIAGNOSIS — E78.2 MIXED HYPERLIPIDEMIA: ICD-10-CM

## 2025-08-21 PROCEDURE — 3078F DIAST BP <80 MM HG: CPT | Performed by: INTERNAL MEDICINE

## 2025-08-21 PROCEDURE — 1036F TOBACCO NON-USER: CPT | Performed by: INTERNAL MEDICINE

## 2025-08-21 PROCEDURE — G8417 CALC BMI ABV UP PARAM F/U: HCPCS | Performed by: INTERNAL MEDICINE

## 2025-08-21 PROCEDURE — G8427 DOCREV CUR MEDS BY ELIG CLIN: HCPCS | Performed by: INTERNAL MEDICINE

## 2025-08-21 PROCEDURE — 3017F COLORECTAL CA SCREEN DOC REV: CPT | Performed by: INTERNAL MEDICINE

## 2025-08-21 PROCEDURE — 3074F SYST BP LT 130 MM HG: CPT | Performed by: INTERNAL MEDICINE

## 2025-08-21 PROCEDURE — 93000 ELECTROCARDIOGRAM COMPLETE: CPT | Performed by: INTERNAL MEDICINE

## 2025-08-21 PROCEDURE — 99214 OFFICE O/P EST MOD 30 MIN: CPT | Performed by: INTERNAL MEDICINE

## 2025-08-21 RX ORDER — ATORVASTATIN CALCIUM 40 MG/1
40 TABLET, FILM COATED ORAL NIGHTLY
Qty: 30 TABLET | Refills: 5 | Status: SHIPPED | OUTPATIENT
Start: 2025-08-21

## 2025-08-21 RX ORDER — LEVOTHYROXINE SODIUM 200 UG/1
200 TABLET ORAL DAILY
Qty: 90 TABLET | Refills: 2 | Status: SHIPPED | OUTPATIENT
Start: 2025-08-21

## 2025-09-03 DIAGNOSIS — I10 PRIMARY HYPERTENSION: ICD-10-CM

## 2025-09-04 RX ORDER — AMLODIPINE BESYLATE 10 MG/1
10 TABLET ORAL DAILY
Qty: 30 TABLET | Refills: 2 | Status: SHIPPED | OUTPATIENT
Start: 2025-09-04

## (undated) DEVICE — KIT ANGIO W/ AT P65 PREM HND CTRL FOR CNTRST DEL ANGIOTOUCH

## (undated) DEVICE — TUBING IRRIGATION 140/160/180/190 SER GI ENDOSCP SMARTCAP

## (undated) DEVICE — BLADE,CARBON-STEEL,15,STRL,DISPOSABLE,TB: Brand: MEDLINE

## (undated) DEVICE — LIQUIBAND RAPID ADHESIVE 36/CS 0.8ML: Brand: MEDLINE

## (undated) DEVICE — PACK,LAPAROTOMY,NO GOWNS: Brand: MEDLINE

## (undated) DEVICE — CATHETER COR DIAG AMPLATZ R MOD CRV 5FR 100CM 0 SIDE H

## (undated) DEVICE — GOWN,AURORA,NONREINFORCED,LARGE: Brand: MEDLINE

## (undated) DEVICE — NEPTUNE E-SEP SMOKE EVACUATION PENCIL, COATED, 70MM BLADE, PUSH BUTTON SWITCH: Brand: NEPTUNE E-SEP

## (undated) DEVICE — Device

## (undated) DEVICE — SYRINGE MED 10ML TRNSLUC BRL PLUNG BLK MRK POLYPR CTRL

## (undated) DEVICE — GOWN,SIRUS,POLYRNF,BRTHSLV,XLN/XL,20/CS: Brand: MEDLINE

## (undated) DEVICE — SINGLE PORT MANIFOLD: Brand: NEPTUNE 2

## (undated) DEVICE — KIT MFLD ISOLATN NACL CNTRST PRT TBNG SPIK W/ PRSS TRNSDUC

## (undated) DEVICE — PROGRAMMER SMART COMM W/HANDSET F/SACRAL NRVE STIMULATORS

## (undated) DEVICE — COVER LT HNDL BLU PLAS

## (undated) DEVICE — DEVICE VASC CLSR 5FR GRY W/ INTEGR SEAL 10ML LOK SYR

## (undated) DEVICE — TOWEL,OR,DSP,ST,BLUE,STD,4/PK,20PK/CS: Brand: MEDLINE

## (undated) DEVICE — PINNACLE INTRODUCER SHEATH: Brand: PINNACLE

## (undated) DEVICE — BRUSH ENDO CLN L90.5IN SHTH DIA1.7MM BRIST DIA5-7MM 2-6MM

## (undated) DEVICE — TUBE SET 96 MM 64 MM H2O PERISTALTIC STD AUX CHANNEL

## (undated) DEVICE — KIT ARMOR C DRP COLLAPSIBLE AND SELF EXP TOP CVR FOR FLUOROSCOPIC

## (undated) DEVICE — GLOVE ORANGE PI 7 1/2   MSG9075

## (undated) DEVICE — 1010 S-DRAPE TOWEL DRAPE 10/BX: Brand: STERI-DRAPE™

## (undated) DEVICE — SUTURE PERMAHAND SZ 2-0 L12X18IN NONABSORBABLE BLK SILK A185H

## (undated) DEVICE — MARKER SURG SKIN GENTIAN VLT REG TIP W/ 6IN RUL DYNJSM01

## (undated) DEVICE — 3M™ TEGADERM™ TRANSPARENT FILM DRESSING FRAME STYLE, 1626W, 4 IN X 4-3/4 IN (10 CM X 12 CM), 50/CT 4CT/CASE: Brand: 3M™ TEGADERM™

## (undated) DEVICE — CONTAINER,SPECIMEN,OR STERILE,4OZ: Brand: MEDLINE

## (undated) DEVICE — SPONGE,LAP,18"X18",DLX,XR,ST,5/PK,40/PK: Brand: MEDLINE

## (undated) DEVICE — ELECTRODE PT RET AD L9FT HI MOIST COND ADH HYDRGEL CORDED

## (undated) DEVICE — COUNTER NDL 40 COUNT HLD 70 FOAM BLK ADH W/ MAG

## (undated) DEVICE — APPLICATOR MEDICATED 10.5 CC SOLUTION HI LT ORNG CHLORAPREP

## (undated) DEVICE — TUBING, SUCTION, 1/4" X 10', STRAIGHT: Brand: MEDLINE

## (undated) DEVICE — SUTURE MCRYL SZ 4-0 L27IN ABSRB UD L19MM PS-2 1/2 CIR PRIM Y426H

## (undated) DEVICE — ENDO CARRY-ON PROCEDURE KIT: Brand: ENDO CARRY-ON PROCEDURE KIT

## (undated) DEVICE — CATHETER DIAG 5FR L100CM LUMN ID0.047IN JL4 CRV 0 SIDE H

## (undated) DEVICE — ELECTRODE TRAIN EDGE SYS W/ QUIK-COMBO CONN

## (undated) DEVICE — GUIDEWIRE VASC L260CM DIA0.035IN TIP L3MM STD EXCHG PTFE J

## (undated) DEVICE — DRESSING HYDROFIBER AQUACEL AG ADVANTAGE 3.5X6 IN

## (undated) DEVICE — SYRINGE IRRIG 60ML SFT PLIABLE BLB EZ TO GRP 1 HND USE W/

## (undated) DEVICE — INTRODUCER SHTH 0.018 IN 4 FRX40 CM KT SFT TIP NIT VSI 7266V

## (undated) DEVICE — GLOVE SURG SZ 6 THK91MIL LTX FREE SYN POLYISOPRENE ANTI

## (undated) DEVICE — GLIDESHEATH SLENDER STAINLESS STEEL KIT: Brand: GLIDESHEATH SLENDER

## (undated) DEVICE — LABEL MED MINI W/ MARKER

## (undated) DEVICE — SUTURE VCRL SZ 2-0 L36IN ABSRB UD L36MM CT-1 1/2 CIR J945H

## (undated) DEVICE — HYPODERMIC SAFETY NEEDLE: Brand: MAGELLAN

## (undated) DEVICE — GLOVE ORANGE PI 8 1/2   MSG9085

## (undated) DEVICE — DRAPE SURG BRACH STRL

## (undated) DEVICE — GAUZE,SPONGE,4"X4",12PLY,STRL,LF,10/TRAY: Brand: MEDLINE

## (undated) DEVICE — SWABSTICK MEDICATED 10% POVIDONE IOD PVP SGL ANTISEP SAT

## (undated) DEVICE — CATHETER COR DIAG PIGTAILS PIG 145 CRV 5FR 110CM 6 SIDE H

## (undated) DEVICE — NEUROSTIMULATOR EXT SM LTWT SGL BTTN H2O RESIST WIRELESS

## (undated) DEVICE — GLOVE ORTHO 8   MSG9480

## (undated) DEVICE — BLADE,CARBON-STEEL,11,STRL,DISPOSABLE,TB: Brand: MEDLINE

## (undated) DEVICE — PERCUTANEOUS ENTRY THINWALL NEEDLE  ONE-PART: Brand: COOK